# Patient Record
Sex: FEMALE | RURAL
[De-identification: names, ages, dates, MRNs, and addresses within clinical notes are randomized per-mention and may not be internally consistent; named-entity substitution may affect disease eponyms.]

---

## 2019-09-16 ENCOUNTER — TELEPHONE (OUTPATIENT)
Dept: FAMILY MEDICINE CLINIC | Facility: CLINIC | Age: 74
End: 2019-09-16

## 2019-09-16 NOTE — TELEPHONE ENCOUNTER
Patient's  came into clinic and states patient is throwing up and having diarrhea and refuses to go to the ER.  He wants to know if you will call her something in for the diarrhea.  Please advise?

## 2019-09-16 NOTE — TELEPHONE ENCOUNTER
He caught me outside the office yesterday AM and asked me what to do - I recommended ER evaluation and possible need for IV fluids - she hasn't been a patient here for a number of years - he is. I still recommend ER visit and cannot Rx anything as she hasn't been seen here for a while and sounds like really needs ER visit.

## 2023-07-12 ENCOUNTER — APPOINTMENT (OUTPATIENT)
Dept: CARDIOLOGY | Facility: HOSPITAL | Age: 78
DRG: 064 | End: 2023-07-12
Payer: MEDICARE

## 2023-07-12 ENCOUNTER — HOSPITAL ENCOUNTER (INPATIENT)
Facility: HOSPITAL | Age: 78
LOS: 8 days | Discharge: NURSING, REHAB OR LONG-TERM CARE | DRG: 064 | End: 2023-07-20
Attending: EMERGENCY MEDICINE | Admitting: FAMILY MEDICINE
Payer: MEDICARE

## 2023-07-12 ENCOUNTER — APPOINTMENT (OUTPATIENT)
Dept: CT IMAGING | Facility: HOSPITAL | Age: 78
DRG: 064 | End: 2023-07-12
Payer: MEDICARE

## 2023-07-12 ENCOUNTER — APPOINTMENT (OUTPATIENT)
Dept: GENERAL RADIOLOGY | Facility: HOSPITAL | Age: 78
DRG: 064 | End: 2023-07-12
Payer: MEDICARE

## 2023-07-12 ENCOUNTER — APPOINTMENT (OUTPATIENT)
Dept: MRI IMAGING | Facility: HOSPITAL | Age: 78
DRG: 064 | End: 2023-07-12
Payer: MEDICARE

## 2023-07-12 DIAGNOSIS — R46.89 COGNITIVE AND BEHAVIORAL CHANGES: ICD-10-CM

## 2023-07-12 DIAGNOSIS — Z95.1 HISTORY OF CORONARY ARTERY BYPASS GRAFT: ICD-10-CM

## 2023-07-12 DIAGNOSIS — R55 SYNCOPE AND COLLAPSE: Primary | ICD-10-CM

## 2023-07-12 DIAGNOSIS — R13.10 DYSPHAGIA, UNSPECIFIED TYPE: ICD-10-CM

## 2023-07-12 DIAGNOSIS — Z74.09 IMPAIRED MOBILITY: ICD-10-CM

## 2023-07-12 DIAGNOSIS — I16.0 HYPERTENSIVE URGENCY: ICD-10-CM

## 2023-07-12 DIAGNOSIS — I21.A1 TYPE 2 MYOCARDIAL INFARCTION DUE TO HYPERTENSION: ICD-10-CM

## 2023-07-12 DIAGNOSIS — E87.6 HYPOKALEMIA: ICD-10-CM

## 2023-07-12 DIAGNOSIS — Z78.9 DECREASED INDEPENDENCE WITH ACTIVITIES OF DAILY LIVING: ICD-10-CM

## 2023-07-12 DIAGNOSIS — R41.89 COGNITIVE AND BEHAVIORAL CHANGES: ICD-10-CM

## 2023-07-12 DIAGNOSIS — E78.5 HYPERLIPIDEMIA, UNSPECIFIED HYPERLIPIDEMIA TYPE: ICD-10-CM

## 2023-07-12 DIAGNOSIS — I10 TYPE 2 MYOCARDIAL INFARCTION DUE TO HYPERTENSION: ICD-10-CM

## 2023-07-12 DIAGNOSIS — R73.9 HYPERGLYCEMIA: ICD-10-CM

## 2023-07-12 LAB
ALBUMIN SERPL-MCNC: 4.4 G/DL (ref 3.5–5.2)
ALBUMIN/GLOB SERPL: 1.8 G/DL
ALP SERPL-CCNC: 108 U/L (ref 39–117)
ALT SERPL W P-5'-P-CCNC: 8 U/L (ref 1–33)
AMPHET+METHAMPHET UR QL: NEGATIVE
AMPHETAMINES UR QL: NEGATIVE
ANION GAP SERPL CALCULATED.3IONS-SCNC: 12 MMOL/L (ref 5–15)
ANION GAP SERPL CALCULATED.3IONS-SCNC: 12 MMOL/L (ref 5–15)
APTT PPP: 27.4 SECONDS (ref 24.1–35)
AST SERPL-CCNC: 14 U/L (ref 1–32)
BACTERIA UR QL AUTO: ABNORMAL /HPF
BARBITURATES UR QL SCN: NEGATIVE
BASOPHILS # BLD AUTO: 0.05 10*3/MM3 (ref 0–0.2)
BASOPHILS NFR BLD AUTO: 0.4 % (ref 0–1.5)
BENZODIAZ UR QL SCN: NEGATIVE
BH CV ECHO MEAS - AO MAX PG: 5.5 MMHG
BH CV ECHO MEAS - AO MEAN PG: 3 MMHG
BH CV ECHO MEAS - AO ROOT DIAM: 2.8 CM
BH CV ECHO MEAS - AO V2 MAX: 117 CM/SEC
BH CV ECHO MEAS - AO V2 VTI: 24.4 CM
BH CV ECHO MEAS - AVA(I,D): 2.7 CM2
BH CV ECHO MEAS - EDV(CUBED): 107.2 ML
BH CV ECHO MEAS - EDV(MOD-SP4): 39.9 ML
BH CV ECHO MEAS - EF(MOD-SP4): 55.9 %
BH CV ECHO MEAS - ESV(CUBED): 7.2 ML
BH CV ECHO MEAS - ESV(MOD-SP4): 17.6 ML
BH CV ECHO MEAS - FS: 59.4 %
BH CV ECHO MEAS - IVS/LVPW: 0.66 CM
BH CV ECHO MEAS - IVSD: 0.84 CM
BH CV ECHO MEAS - LA DIMENSION: 3.7 CM
BH CV ECHO MEAS - LAT PEAK E' VEL: 3.6 CM/SEC
BH CV ECHO MEAS - LV DIASTOLIC VOL/BSA (35-75): 24.4 CM2
BH CV ECHO MEAS - LV MASS(C)D: 180.4 GRAMS
BH CV ECHO MEAS - LV MAX PG: 4.5 MMHG
BH CV ECHO MEAS - LV MEAN PG: 2 MMHG
BH CV ECHO MEAS - LV SYSTOLIC VOL/BSA (12-30): 10.7 CM2
BH CV ECHO MEAS - LV V1 MAX: 106 CM/SEC
BH CV ECHO MEAS - LV V1 VTI: 20.9 CM
BH CV ECHO MEAS - LVIDD: 4.8 CM
BH CV ECHO MEAS - LVIDS: 1.93 CM
BH CV ECHO MEAS - LVOT AREA: 3.1 CM2
BH CV ECHO MEAS - LVOT DIAM: 2 CM
BH CV ECHO MEAS - LVPWD: 1.27 CM
BH CV ECHO MEAS - MED PEAK E' VEL: 4.4 CM/SEC
BH CV ECHO MEAS - MV A MAX VEL: 75.8 CM/SEC
BH CV ECHO MEAS - MV DEC TIME: 0.27 MSEC
BH CV ECHO MEAS - MV E MAX VEL: 54 CM/SEC
BH CV ECHO MEAS - MV E/A: 0.71
BH CV ECHO MEAS - RAP SYSTOLE: 5 MMHG
BH CV ECHO MEAS - RVSP: 19.3 MMHG
BH CV ECHO MEAS - SI(MOD-SP4): 13.6 ML/M2
BH CV ECHO MEAS - SV(LVOT): 65.7 ML
BH CV ECHO MEAS - SV(MOD-SP4): 22.3 ML
BH CV ECHO MEAS - TR MAX PG: 14.3 MMHG
BH CV ECHO MEAS - TR MAX VEL: 189 CM/SEC
BH CV ECHO MEASUREMENTS AVERAGE E/E' RATIO: 13.5
BH CV ECHO SHUNT ASSESSMENT PERFORMED (HIDDEN SCRIPTING): 1
BILIRUB SERPL-MCNC: 0.6 MG/DL (ref 0–1.2)
BILIRUB UR QL STRIP: NEGATIVE
BUN SERPL-MCNC: 7 MG/DL (ref 8–23)
BUN SERPL-MCNC: 7 MG/DL (ref 8–23)
BUN/CREAT SERPL: 10.1 (ref 7–25)
BUN/CREAT SERPL: 9.1 (ref 7–25)
BUPRENORPHINE SERPL-MCNC: NEGATIVE NG/ML
CALCIUM SPEC-SCNC: 8.6 MG/DL (ref 8.6–10.5)
CALCIUM SPEC-SCNC: 9.2 MG/DL (ref 8.6–10.5)
CANNABINOIDS SERPL QL: NEGATIVE
CHLORIDE SERPL-SCNC: 105 MMOL/L (ref 98–107)
CHLORIDE SERPL-SCNC: 107 MMOL/L (ref 98–107)
CHOLEST SERPL-MCNC: 175 MG/DL (ref 0–200)
CLARITY UR: CLEAR
CO2 SERPL-SCNC: 23 MMOL/L (ref 22–29)
CO2 SERPL-SCNC: 24 MMOL/L (ref 22–29)
COCAINE UR QL: NEGATIVE
COLOR UR: YELLOW
CREAT SERPL-MCNC: 0.69 MG/DL (ref 0.57–1)
CREAT SERPL-MCNC: 0.77 MG/DL (ref 0.57–1)
DEPRECATED RDW RBC AUTO: 41.5 FL (ref 37–54)
EGFRCR SERPLBLD CKD-EPI 2021: 79.1 ML/MIN/1.73
EGFRCR SERPLBLD CKD-EPI 2021: 89 ML/MIN/1.73
EOSINOPHIL # BLD AUTO: 0.03 10*3/MM3 (ref 0–0.4)
EOSINOPHIL NFR BLD AUTO: 0.3 % (ref 0.3–6.2)
ERYTHROCYTE [DISTWIDTH] IN BLOOD BY AUTOMATED COUNT: 12.6 % (ref 12.3–15.4)
ETHANOL UR QL: <0.01 %
FENTANYL UR-MCNC: NEGATIVE NG/ML
GEN 5 2HR TROPONIN T REFLEX: 45 NG/L
GLOBULIN UR ELPH-MCNC: 2.4 GM/DL
GLUCOSE BLDC GLUCOMTR-MCNC: 131 MG/DL (ref 70–130)
GLUCOSE BLDC GLUCOMTR-MCNC: 149 MG/DL (ref 70–130)
GLUCOSE BLDC GLUCOMTR-MCNC: 153 MG/DL (ref 70–130)
GLUCOSE SERPL-MCNC: 200 MG/DL (ref 65–99)
GLUCOSE SERPL-MCNC: 209 MG/DL (ref 65–99)
GLUCOSE UR STRIP-MCNC: NEGATIVE MG/DL
HBA1C MFR BLD: 7 % (ref 4.8–5.6)
HCT VFR BLD AUTO: 37.3 % (ref 34–46.6)
HDLC SERPL-MCNC: 42 MG/DL (ref 40–60)
HGB BLD-MCNC: 12.2 G/DL (ref 12–15.9)
HGB UR QL STRIP.AUTO: NEGATIVE
HOLD SPECIMEN: NORMAL
HYALINE CASTS UR QL AUTO: ABNORMAL /LPF
IMM GRANULOCYTES # BLD AUTO: 0.05 10*3/MM3 (ref 0–0.05)
IMM GRANULOCYTES NFR BLD AUTO: 0.4 % (ref 0–0.5)
INR PPP: 0.92 (ref 0.91–1.09)
KETONES UR QL STRIP: NEGATIVE
LDLC SERPL CALC-MCNC: 108 MG/DL (ref 0–100)
LDLC/HDLC SERPL: 2.5 {RATIO}
LEFT ATRIUM VOLUME INDEX: 22.9 ML/M2
LEFT ATRIUM VOLUME: 37.6 ML
LEUKOCYTE ESTERASE UR QL STRIP.AUTO: ABNORMAL
LIPASE SERPL-CCNC: 27 U/L (ref 13–60)
LYMPHOCYTES # BLD AUTO: 1.21 10*3/MM3 (ref 0.7–3.1)
LYMPHOCYTES NFR BLD AUTO: 10.5 % (ref 19.6–45.3)
MAGNESIUM SERPL-MCNC: 1.7 MG/DL (ref 1.6–2.4)
MAGNESIUM SERPL-MCNC: 1.7 MG/DL (ref 1.6–2.4)
MCH RBC QN AUTO: 29.5 PG (ref 26.6–33)
MCHC RBC AUTO-ENTMCNC: 32.7 G/DL (ref 31.5–35.7)
MCV RBC AUTO: 90.3 FL (ref 79–97)
METHADONE UR QL SCN: NEGATIVE
MONOCYTES # BLD AUTO: 0.81 10*3/MM3 (ref 0.1–0.9)
MONOCYTES NFR BLD AUTO: 7 % (ref 5–12)
NEUTROPHILS NFR BLD AUTO: 81.4 % (ref 42.7–76)
NEUTROPHILS NFR BLD AUTO: 9.39 10*3/MM3 (ref 1.7–7)
NITRITE UR QL STRIP: NEGATIVE
NRBC BLD AUTO-RTO: 0 /100 WBC (ref 0–0.2)
OPIATES UR QL: NEGATIVE
OXYCODONE UR QL SCN: NEGATIVE
PCP UR QL SCN: NEGATIVE
PH UR STRIP.AUTO: 7.5 [PH] (ref 5–8)
PLATELET # BLD AUTO: 208 10*3/MM3 (ref 140–450)
PMV BLD AUTO: 10.4 FL (ref 6–12)
POTASSIUM SERPL-SCNC: 3 MMOL/L (ref 3.5–5.2)
POTASSIUM SERPL-SCNC: 3.7 MMOL/L (ref 3.5–5.2)
PROPOXYPH UR QL: NEGATIVE
PROT SERPL-MCNC: 6.8 G/DL (ref 6–8.5)
PROT UR QL STRIP: NEGATIVE
PROTHROMBIN TIME: 12.4 SECONDS (ref 11.8–14.8)
RBC # BLD AUTO: 4.13 10*6/MM3 (ref 3.77–5.28)
RBC # UR STRIP: ABNORMAL /HPF
REF LAB TEST METHOD: ABNORMAL
SODIUM SERPL-SCNC: 141 MMOL/L (ref 136–145)
SODIUM SERPL-SCNC: 142 MMOL/L (ref 136–145)
SP GR UR STRIP: 1.01 (ref 1–1.03)
SQUAMOUS #/AREA URNS HPF: ABNORMAL /HPF
TRANS CELLS #/AREA URNS HPF: ABNORMAL /HPF
TRICYCLICS UR QL SCN: NEGATIVE
TRIGL SERPL-MCNC: 139 MG/DL (ref 0–150)
TROPONIN T DELTA: 0 NG/L
TROPONIN T SERPL HS-MCNC: 45 NG/L
UROBILINOGEN UR QL STRIP: ABNORMAL
VLDLC SERPL-MCNC: 25 MG/DL (ref 5–40)
WBC # UR STRIP: ABNORMAL /HPF
WBC NRBC COR # BLD: 11.54 10*3/MM3 (ref 3.4–10.8)
WHOLE BLOOD HOLD COAG: NORMAL
WHOLE BLOOD HOLD SPECIMEN: NORMAL

## 2023-07-12 PROCEDURE — 36415 COLL VENOUS BLD VENIPUNCTURE: CPT

## 2023-07-12 PROCEDURE — 80061 LIPID PANEL: CPT | Performed by: INTERNAL MEDICINE

## 2023-07-12 PROCEDURE — 0 GADOBENATE DIMEGLUMINE 529 MG/ML SOLUTION: Performed by: FAMILY MEDICINE

## 2023-07-12 PROCEDURE — 83735 ASSAY OF MAGNESIUM: CPT | Performed by: INTERNAL MEDICINE

## 2023-07-12 PROCEDURE — 93010 ELECTROCARDIOGRAM REPORT: CPT | Performed by: INTERNAL MEDICINE

## 2023-07-12 PROCEDURE — 83690 ASSAY OF LIPASE: CPT | Performed by: EMERGENCY MEDICINE

## 2023-07-12 PROCEDURE — 82948 REAGENT STRIP/BLOOD GLUCOSE: CPT

## 2023-07-12 PROCEDURE — 70496 CT ANGIOGRAPHY HEAD: CPT

## 2023-07-12 PROCEDURE — 93005 ELECTROCARDIOGRAM TRACING: CPT

## 2023-07-12 PROCEDURE — 70553 MRI BRAIN STEM W/O & W/DYE: CPT

## 2023-07-12 PROCEDURE — 80307 DRUG TEST PRSMV CHEM ANLYZR: CPT | Performed by: INTERNAL MEDICINE

## 2023-07-12 PROCEDURE — 92610 EVALUATE SWALLOWING FUNCTION: CPT

## 2023-07-12 PROCEDURE — 36415 COLL VENOUS BLD VENIPUNCTURE: CPT | Performed by: INTERNAL MEDICINE

## 2023-07-12 PROCEDURE — A9577 INJ MULTIHANCE: HCPCS | Performed by: FAMILY MEDICINE

## 2023-07-12 PROCEDURE — 82077 ASSAY SPEC XCP UR&BREATH IA: CPT | Performed by: EMERGENCY MEDICINE

## 2023-07-12 PROCEDURE — 80053 COMPREHEN METABOLIC PANEL: CPT | Performed by: INTERNAL MEDICINE

## 2023-07-12 PROCEDURE — 71045 X-RAY EXAM CHEST 1 VIEW: CPT

## 2023-07-12 PROCEDURE — 70450 CT HEAD/BRAIN W/O DYE: CPT

## 2023-07-12 PROCEDURE — 81001 URINALYSIS AUTO W/SCOPE: CPT | Performed by: EMERGENCY MEDICINE

## 2023-07-12 PROCEDURE — 97162 PT EVAL MOD COMPLEX 30 MIN: CPT

## 2023-07-12 PROCEDURE — 99222 1ST HOSP IP/OBS MODERATE 55: CPT | Performed by: CLINICAL NURSE SPECIALIST

## 2023-07-12 PROCEDURE — 93306 TTE W/DOPPLER COMPLETE: CPT

## 2023-07-12 PROCEDURE — 93306 TTE W/DOPPLER COMPLETE: CPT | Performed by: INTERNAL MEDICINE

## 2023-07-12 PROCEDURE — 25510000001 IOPAMIDOL PER 1 ML: Performed by: FAMILY MEDICINE

## 2023-07-12 PROCEDURE — 93005 ELECTROCARDIOGRAM TRACING: CPT | Performed by: EMERGENCY MEDICINE

## 2023-07-12 PROCEDURE — 85025 COMPLETE CBC W/AUTO DIFF WBC: CPT | Performed by: EMERGENCY MEDICINE

## 2023-07-12 PROCEDURE — 83735 ASSAY OF MAGNESIUM: CPT | Performed by: EMERGENCY MEDICINE

## 2023-07-12 PROCEDURE — 99285 EMERGENCY DEPT VISIT HI MDM: CPT

## 2023-07-12 PROCEDURE — 97166 OT EVAL MOD COMPLEX 45 MIN: CPT | Performed by: OCCUPATIONAL THERAPIST

## 2023-07-12 PROCEDURE — 85730 THROMBOPLASTIN TIME PARTIAL: CPT | Performed by: EMERGENCY MEDICINE

## 2023-07-12 PROCEDURE — 70498 CT ANGIOGRAPHY NECK: CPT

## 2023-07-12 PROCEDURE — 84484 ASSAY OF TROPONIN QUANT: CPT | Performed by: EMERGENCY MEDICINE

## 2023-07-12 PROCEDURE — 83036 HEMOGLOBIN GLYCOSYLATED A1C: CPT | Performed by: INTERNAL MEDICINE

## 2023-07-12 PROCEDURE — 85610 PROTHROMBIN TIME: CPT | Performed by: EMERGENCY MEDICINE

## 2023-07-12 RX ORDER — CARVEDILOL 6.25 MG/1
6.25 TABLET ORAL 2 TIMES DAILY WITH MEALS
Status: DISCONTINUED | OUTPATIENT
Start: 2023-07-12 | End: 2023-07-12

## 2023-07-12 RX ORDER — SODIUM CHLORIDE 0.9 % (FLUSH) 0.9 %
10 SYRINGE (ML) INJECTION AS NEEDED
Status: DISCONTINUED | OUTPATIENT
Start: 2023-07-12 | End: 2023-07-20 | Stop reason: HOSPADM

## 2023-07-12 RX ORDER — SODIUM CHLORIDE 0.9 % (FLUSH) 0.9 %
10 SYRINGE (ML) INJECTION AS NEEDED
Status: DISCONTINUED | OUTPATIENT
Start: 2023-07-12 | End: 2023-07-18

## 2023-07-12 RX ORDER — CHOLECALCIFEROL (VITAMIN D3) 125 MCG
5 CAPSULE ORAL NIGHTLY PRN
Status: DISCONTINUED | OUTPATIENT
Start: 2023-07-12 | End: 2023-07-20 | Stop reason: HOSPADM

## 2023-07-12 RX ORDER — CLOPIDOGREL BISULFATE 75 MG/1
300 TABLET ORAL ONCE
Status: COMPLETED | OUTPATIENT
Start: 2023-07-12 | End: 2023-07-12

## 2023-07-12 RX ORDER — ALUMINA, MAGNESIA, AND SIMETHICONE 2400; 2400; 240 MG/30ML; MG/30ML; MG/30ML
7.5 SUSPENSION ORAL EVERY 4 HOURS PRN
Status: DISCONTINUED | OUTPATIENT
Start: 2023-07-12 | End: 2023-07-20 | Stop reason: HOSPADM

## 2023-07-12 RX ORDER — POTASSIUM CHLORIDE 20 MEQ/1
40 TABLET, EXTENDED RELEASE ORAL ONCE
Status: COMPLETED | OUTPATIENT
Start: 2023-07-12 | End: 2023-07-12

## 2023-07-12 RX ORDER — CLONIDINE HYDROCHLORIDE 0.1 MG/1
0.2 TABLET ORAL ONCE
Status: DISCONTINUED | OUTPATIENT
Start: 2023-07-12 | End: 2023-07-12

## 2023-07-12 RX ORDER — LISINOPRIL 10 MG/1
10 TABLET ORAL DAILY
COMMUNITY

## 2023-07-12 RX ORDER — CLONIDINE HYDROCHLORIDE 0.1 MG/1
0.1 TABLET ORAL ONCE
Status: COMPLETED | OUTPATIENT
Start: 2023-07-12 | End: 2023-07-12

## 2023-07-12 RX ORDER — LISINOPRIL 20 MG/1
20 TABLET ORAL
Status: DISCONTINUED | OUTPATIENT
Start: 2023-07-12 | End: 2023-07-16

## 2023-07-12 RX ORDER — BISACODYL 10 MG
10 SUPPOSITORY, RECTAL RECTAL DAILY PRN
Status: DISCONTINUED | OUTPATIENT
Start: 2023-07-12 | End: 2023-07-20 | Stop reason: HOSPADM

## 2023-07-12 RX ORDER — ACETAMINOPHEN 160 MG/5ML
650 SOLUTION ORAL EVERY 4 HOURS PRN
Status: DISCONTINUED | OUTPATIENT
Start: 2023-07-12 | End: 2023-07-18

## 2023-07-12 RX ORDER — SODIUM CHLORIDE 0.9 % (FLUSH) 0.9 %
10 SYRINGE (ML) INJECTION EVERY 12 HOURS SCHEDULED
Status: DISCONTINUED | OUTPATIENT
Start: 2023-07-12 | End: 2023-07-20 | Stop reason: HOSPADM

## 2023-07-12 RX ORDER — SODIUM CHLORIDE 9 MG/ML
40 INJECTION, SOLUTION INTRAVENOUS AS NEEDED
Status: DISCONTINUED | OUTPATIENT
Start: 2023-07-12 | End: 2023-07-18

## 2023-07-12 RX ORDER — ONDANSETRON 2 MG/ML
4 INJECTION INTRAMUSCULAR; INTRAVENOUS EVERY 6 HOURS PRN
Status: DISCONTINUED | OUTPATIENT
Start: 2023-07-12 | End: 2023-07-20 | Stop reason: HOSPADM

## 2023-07-12 RX ORDER — ONDANSETRON 2 MG/ML
4 INJECTION INTRAMUSCULAR; INTRAVENOUS EVERY 6 HOURS PRN
Status: DISCONTINUED | OUTPATIENT
Start: 2023-07-12 | End: 2023-07-12 | Stop reason: SDUPTHER

## 2023-07-12 RX ORDER — PRAVASTATIN SODIUM 20 MG
40 TABLET ORAL NIGHTLY
Status: DISCONTINUED | OUTPATIENT
Start: 2023-07-12 | End: 2023-07-12

## 2023-07-12 RX ORDER — LABETALOL HYDROCHLORIDE 5 MG/ML
10 INJECTION, SOLUTION INTRAVENOUS ONCE
Status: COMPLETED | OUTPATIENT
Start: 2023-07-12 | End: 2023-07-12

## 2023-07-12 RX ORDER — SODIUM CHLORIDE 9 MG/ML
40 INJECTION, SOLUTION INTRAVENOUS AS NEEDED
Status: DISCONTINUED | OUTPATIENT
Start: 2023-07-12 | End: 2023-07-20 | Stop reason: HOSPADM

## 2023-07-12 RX ORDER — ATORVASTATIN CALCIUM 40 MG/1
80 TABLET, FILM COATED ORAL NIGHTLY
Status: DISCONTINUED | OUTPATIENT
Start: 2023-07-12 | End: 2023-07-20 | Stop reason: HOSPADM

## 2023-07-12 RX ORDER — ASPIRIN 81 MG/1
81 TABLET ORAL DAILY
Status: DISCONTINUED | OUTPATIENT
Start: 2023-07-12 | End: 2023-07-20 | Stop reason: HOSPADM

## 2023-07-12 RX ORDER — ACETAMINOPHEN 325 MG/1
650 TABLET ORAL EVERY 4 HOURS PRN
Status: DISCONTINUED | OUTPATIENT
Start: 2023-07-12 | End: 2023-07-18

## 2023-07-12 RX ORDER — CLOPIDOGREL BISULFATE 75 MG/1
75 TABLET ORAL DAILY
Status: DISCONTINUED | OUTPATIENT
Start: 2023-07-13 | End: 2023-07-20 | Stop reason: HOSPADM

## 2023-07-12 RX ORDER — PRAVASTATIN SODIUM 40 MG
40 TABLET ORAL DAILY
COMMUNITY
End: 2023-07-20 | Stop reason: HOSPADM

## 2023-07-12 RX ORDER — ACETAMINOPHEN 650 MG/1
650 SUPPOSITORY RECTAL EVERY 4 HOURS PRN
Status: DISCONTINUED | OUTPATIENT
Start: 2023-07-12 | End: 2023-07-12

## 2023-07-12 RX ORDER — SODIUM CHLORIDE 0.9 % (FLUSH) 0.9 %
10 SYRINGE (ML) INJECTION EVERY 12 HOURS SCHEDULED
Status: DISCONTINUED | OUTPATIENT
Start: 2023-07-12 | End: 2023-07-12

## 2023-07-12 RX ADMIN — Medication 5 MG: at 22:48

## 2023-07-12 RX ADMIN — POTASSIUM CHLORIDE 40 MEQ: 1500 TABLET, EXTENDED RELEASE ORAL at 03:54

## 2023-07-12 RX ADMIN — Medication 10 ML: at 10:44

## 2023-07-12 RX ADMIN — SODIUM CHLORIDE 5 MG/HR: 9 INJECTION, SOLUTION INTRAVENOUS at 05:03

## 2023-07-12 RX ADMIN — ATORVASTATIN CALCIUM 80 MG: 40 TABLET ORAL at 21:57

## 2023-07-12 RX ADMIN — CARVEDILOL 6.25 MG: 6.25 TABLET, FILM COATED ORAL at 05:33

## 2023-07-12 RX ADMIN — ASPIRIN 81 MG: 81 TABLET, COATED ORAL at 10:15

## 2023-07-12 RX ADMIN — LABETALOL HYDROCHLORIDE 10 MG: 5 INJECTION INTRAVENOUS at 03:10

## 2023-07-12 RX ADMIN — LABETALOL HYDROCHLORIDE 10 MG: 5 INJECTION INTRAVENOUS at 01:20

## 2023-07-12 RX ADMIN — IOPAMIDOL 100 ML: 755 INJECTION, SOLUTION INTRAVENOUS at 11:05

## 2023-07-12 RX ADMIN — SODIUM CHLORIDE 1000 ML: 9 INJECTION, SOLUTION INTRAVENOUS at 01:19

## 2023-07-12 RX ADMIN — ACETAMINOPHEN 650 MG: 325 TABLET, FILM COATED ORAL at 22:48

## 2023-07-12 RX ADMIN — GADOBENATE DIMEGLUMINE 20 ML: 529 INJECTION, SOLUTION INTRAVENOUS at 06:43

## 2023-07-12 RX ADMIN — LISINOPRIL 20 MG: 20 TABLET ORAL at 05:33

## 2023-07-12 RX ADMIN — CLOPIDOGREL BISULFATE 300 MG: 75 TABLET ORAL at 10:14

## 2023-07-12 RX ADMIN — Medication 10 ML: at 21:57

## 2023-07-12 RX ADMIN — CLONIDINE HYDROCHLORIDE 0.1 MG: 0.1 TABLET ORAL at 03:54

## 2023-07-12 RX ADMIN — ACETAMINOPHEN 650 MG: 325 TABLET, FILM COATED ORAL at 13:49

## 2023-07-12 NOTE — PLAN OF CARE
Problem: Fall Injury Risk  Goal: Absence of Fall and Fall-Related Injury  Outcome: Ongoing, Progressing  Intervention: Promote Injury-Free Environment  Recent Flowsheet Documentation  Taken 7/12/2023 1100 by Shandra Faria RN  Safety Promotion/Fall Prevention:   room organization consistent   safety round/check completed  Taken 7/12/2023 1000 by Shandra aFria RN  Safety Promotion/Fall Prevention: safety round/check completed     Problem: Skin Injury Risk Increased  Goal: Skin Health and Integrity  Outcome: Ongoing, Progressing  Intervention: Optimize Skin Protection  Recent Flowsheet Documentation  Taken 7/12/2023 1100 by Shandra Faria RN  Head of Bed (HOB) Positioning: HOB at 20-30 degrees     Problem: Adult Inpatient Plan of Care  Goal: Plan of Care Review  Outcome: Ongoing, Progressing  Flowsheets  Taken 7/12/2023 1124 by Shandra Faria RN  Progress: no change  Plan of Care Reviewed With: patient  Taken 7/12/2023 1035 by Caren Colón, OTR/L, CSRS  Outcome Evaluation: OT eval completed. Pt presents alert, oriented to self and vcs/prompts for time. She reports at baseline being independent with all adls and mobility, residing at home with spouse.  Goal: Patient-Specific Goal (Individualized)  Outcome: Ongoing, Progressing  Goal: Absence of Hospital-Acquired Illness or Injury  Outcome: Ongoing, Progressing  Intervention: Identify and Manage Fall Risk  Recent Flowsheet Documentation  Taken 7/12/2023 1100 by Shandra Faria RN  Safety Promotion/Fall Prevention:   room organization consistent   safety round/check completed  Taken 7/12/2023 1000 by Shandra Faria RN  Safety Promotion/Fall Prevention: safety round/check completed  Intervention: Prevent Skin Injury  Recent Flowsheet Documentation  Taken 7/12/2023 1100 by Shandra Faria RN  Body Position: right  Taken 7/12/2023 1000 by Shandra Faria RN  Body Position:   tilted   right   position changed independently  Intervention: Prevent and Manage VTE (Venous  Thromboembolism) Risk  Recent Flowsheet Documentation  Taken 7/12/2023 1100 by Shandra Faria, RN  Activity Management: bedrest  Taken 7/12/2023 1000 by Shandra Faria RN  Activity Management: bedrest  Goal: Optimal Comfort and Wellbeing  Outcome: Ongoing, Progressing  Goal: Readiness for Transition of Care  Outcome: Ongoing, Progressing     Problem: Hypertension Comorbidity  Goal: Blood Pressure in Desired Range  Outcome: Ongoing, Progressing     Problem: Thought Process Alteration  Goal: Optimal Thought Clarity  Outcome: Ongoing, Progressing  Intervention: Minimize Safety Risk and Altered Thought  Recent Flowsheet Documentation  Taken 7/12/2023 1000 by Shandra Faria, RN  Enhanced Safety Measures: bed alarm set   Goal Outcome Evaluation:  Plan of Care Reviewed With: patient        Progress: no change  Patient presents with confusion r/t stroke. Patient is reoriented each time nurse in room. Patient NIH is 7. She has periods of less confusion.

## 2023-07-12 NOTE — THERAPY EVALUATION
Acute Care - Speech Language Pathology   Swallow Initial Evaluation [facility]     Patient Name: Sherlyn Gutierrez  : 1945  MRN: [id number]  Today's Date: 2023               Admit Date: 2023    SPEECH-LANGUAGE PATHOLOGY EVALUATION - SWALLOW  Subjective: The patient was seen on this date for a Clinical Swallow evaluation.  Patient was alert and cooperative.  Significant history: HTN urgency, type 2 MI, syncope and collapse, MRI brain: acute nonhemorrhagic ischemia of frontal lobe within R MCA distribution. Hx CABG, HLD.   Objective: Textures given included thin liquid, nectar thick liquid, honey thick liquid, puree consistency, and regular consistency.  Assessment: Difficulties were noted with regular consistency.  Observations:  Poor rotary chew and delayed cough with the regular solid. Pt is edentulous and her dentures are not available.   SLP Findings:  Patient presents with mild oropharyngeal dysphagia, without esophageal component.   Recommendations: Diet Textures: thin liquid,  soft to chew food with ground meats.  Medications should be taken whole with thin liquids.   Recommended Strategies: Upright for PO, small bites and sips, and alternate liquids and solids. Oral care before breakfast, after all meals and PRN.  Other Recommended Evaluations: Cognitive-Linguistic Evaluation    Dysphagia therapy is recommended.  Alysha Griffiths, ZOË-SLP 2023 09:00 CDT     Visit Dx:     ICD-10-CM ICD-9-CM   1. Syncope and collapse  R55 780.2   2. Dysphagia, unspecified type  R13.10 787.20     Patient Active Problem List   Diagnosis    Syncope and collapse    Hypertensive urgency    Type 2 myocardial infarction due to hypertension    History of coronary artery bypass graft    Hyperlipidemia    Hypokalemia    Hyperglycemia     Past Medical History:   Diagnosis Date    Hyperlipidemia     Hypertension      Past Surgical History:   Procedure Laterality Date    OR RPR ANOM CORONARY ARTERY PULM ART ORIGIN GRAFT          SLP Recommendation and Plan  SLP Swallowing Diagnosis: mild, oral dysphagia, pharyngeal dysphagia (07/12/23 0822)  SLP Diet Recommendation: soft to chew textures, ground, thin liquids (07/12/23 0822)  Recommended Precautions and Strategies: upright posture during/after eating, small bites of food and sips of liquid, alternate between small bites of food and sips of liquid, general aspiration precautions (07/12/23 0822)  SLP Rec. for Method of Medication Administration: meds whole, with thin liquids (07/12/23 0822)     Monitor for Signs of Aspiration: yes, cough, gurgly voice, throat clearing, pneumonia, notify SLP if any concerns (07/12/23 0822)  Recommended Diagnostics: SLE/Cog/Motor Speech Evaluation (07/12/23 0822)  Swallow Criteria for Skilled Therapeutic Interventions Met: demonstrates skilled criteria (07/12/23 0822)  Anticipated Discharge Disposition (SLP): unknown (07/12/23 0822)  Rehab Potential/Prognosis, Swallowing: good, to achieve stated therapy goals (07/12/23 0822)  Therapy Frequency (Swallow): PRN (07/12/23 0822)  Predicted Duration Therapy Intervention (Days): until discharge (07/12/23 0822)                                        Plan of Care Reviewed With: patient  Outcome Evaluation: See note      SWALLOW EVALUATION (last 72 hours)       SLP Adult Swallow Evaluation       Row Name 07/12/23 0822                   Rehab Evaluation    Document Type evaluation  -MB        Subjective Information no complaints  -MB        Patient Observations alert;cooperative  -MB        Patient/Family/Caregiver Comments/Observations No family present  -MB           General Information    Patient Profile Reviewed yes  -MB        Pertinent History Of Current Problem HTN urgency, type 2 MI, syncope and collapse, MRI brain: acute nonhemorrhagic ischemia of frontal lobe within R MCA distribution. Hx CABG, HLD.  -MB        Current Method of Nutrition NPO  -MB        Precautions/Limitations, Vision WFL  -MB         Precautions/Limitations, Hearing WFL  -MB        Prior Level of Function-Communication WFL  -MB        Prior Level of Function-Swallowing no diet consistency restrictions  -MB        Plans/Goals Discussed with patient  -MB        Barriers to Rehab cognitive status  -MB        Patient's Goals for Discharge patient did not state  -MB           Pain    Additional Documentation Pain Scale: FACES Pre/Post-Treatment (Group)  -MB           Pain Scale: FACES Pre/Post-Treatment    Pain: FACES Scale, Pretreatment 0-->no hurt  -MB           Oral Motor Structure and Function    Dentition Assessment edentulous, dentures not available  -MB        Secretion Management WNL/WFL  -MB        Mucosal Quality moist, healthy  -MB           Oral Musculature and Cranial Nerve Assessment    Oral Motor General Assessment lingual impairment  -MB        Lingual Impairment, Detail. Cranial Nerves IX, XII (Glossopharyngeal and Hypoglossal) CN12: Motor Impairment;reduced strength left  -MB           General Eating/Swallowing Observations    Respiratory Support Currently in Use room air  -MB        Eating/Swallowing Skills fed by SLP  -MB        Positioning During Eating upright in bed  -MB        Utensils Used spoon;straw  -MB        Consistencies Trialed regular textures;pureed;thin liquids;nectar/syrup-thick liquids;honey-thick liquids  -MB           Clinical Swallow Eval    Oral Prep Phase impaired  -MB        Oral Transit WFL  -MB        Oral Residue WFL  -MB        Pharyngeal Phase suspected pharyngeal impairment  -MB        Esophageal Phase unremarkable  -MB        Clinical Swallow Evaluation Summary See note  -MB           Oral Prep Concerns    Oral Prep Concerns poor rotary chew  -MB        Poor Rotary Chew regular consistencies  -MB           Pharyngeal Phase Concerns    Pharyngeal Phase Concerns cough  -MB        Cough regular consistencies  -MB           SLP Evaluation Clinical Impression    SLP Swallowing Diagnosis mild;oral  dysphagia;pharyngeal dysphagia  -MB        Functional Impact risk of aspiration/pneumonia;risk of malnutrition  -MB        Rehab Potential/Prognosis, Swallowing good, to achieve stated therapy goals  -MB        Swallow Criteria for Skilled Therapeutic Interventions Met demonstrates skilled criteria  -MB           Recommendations    Therapy Frequency (Swallow) PRN  -MB        Predicted Duration Therapy Intervention (Days) until discharge  -MB        SLP Diet Recommendation soft to chew textures;ground;thin liquids  -MB        Recommended Diagnostics SLE/Cog/Motor Speech Evaluation  -MB        Recommended Precautions and Strategies upright posture during/after eating;small bites of food and sips of liquid;alternate between small bites of food and sips of liquid;general aspiration precautions  -MB        Oral Care Recommendations Oral Care BID/PRN  -MB        SLP Rec. for Method of Medication Administration meds whole;with thin liquids  -MB        Monitor for Signs of Aspiration yes;cough;gurgly voice;throat clearing;pneumonia;notify SLP if any concerns  -MB        Anticipated Discharge Disposition (SLP) unknown  -MB           Swallow Goals (SLP)    Swallow LTGs Swallow Long Term Goal (free text)  -MB        Swallow STGs diet tolerance goal selection (SLP)  -MB        Diet Tolerance Goal Selection (SLP) Patient will tolerate trials of  -MB           (LTG) Swallow    (LTG) Swallow Pt will tolerate least restrictive diet with no overt s/s of aspiration.  -MB        [place] (Swallow Long Term Goal) independently (over 90% accuracy)  -MB        Time Frame (Swallow Long Term Goal) by discharge  -MB        Barriers (Swallow Long Term Goal) n/a  -MB        Progress/Outcomes (Swallow Long Term Goal) new goal  -MB        Comment (Swallow Long Term Goal) n/a  -MB           (STG) Patient will tolerate trials of    Consistencies Trialed (Tolerate trials) regular textures;soft to chew (ground) textures;thin liquids  -MB         Desired Outcome (Tolerate trials) without signs/symptoms of aspiration;with adequate oral prep/transit/clearance  -MB        [place] (Tolerate trials) independently (over 90% accuracy)  -MB        Time Frame (Tolerate trials) by discharge  -MB        Progress/Outcomes (Tolerate trials) new goal  -MB        Comment (Tolerate trials) n/a  -MB                  User Key  (r) = Recorded By, (t) = Taken By, (c) = Cosigned By      Initials Name Effective Dates    Alysha Van CCC-SLP 02/03/23 -                     EDUCATION  The patient has been educated in the following areas:   Dysphagia (Swallowing Impairment).        SLP GOALS       Row Name 07/12/23 0822             (LTG) Swallow    (LTG) Swallow Pt will tolerate least restrictive diet with no overt s/s of aspiration.  -MB      [place] (Swallow Long Term Goal) independently (over 90% accuracy)  -MB      Time Frame (Swallow Long Term Goal) by discharge  -MB      Barriers (Swallow Long Term Goal) n/a  -MB      Progress/Outcomes (Swallow Long Term Goal) new goal  -MB      Comment (Swallow Long Term Goal) n/a  -MB         (STG) Patient will tolerate trials of    Consistencies Trialed (Tolerate trials) regular textures;soft to chew (ground) textures;thin liquids  -MB      Desired Outcome (Tolerate trials) without signs/symptoms of aspiration;with adequate oral prep/transit/clearance  -MB      [place] (Tolerate trials) independently (over 90% accuracy)  -MB      Time Frame (Tolerate trials) by discharge  -MB      Progress/Outcomes (Tolerate trials) new goal  -MB      Comment (Tolerate trials) n/a  -MB                User Key  (r) = Recorded By, (t) = Taken By, (c) = Cosigned By      Initials Name Provider Type    Alysha Van CCC-SLP Speech and Language Pathologist                       Time Calculation:    Time Calculation- SLP       Row Name 07/12/23 0900             Time Calculation- SLP    SLP Start Time 0822  -MB      SLP Stop Time  0900  -MB      SLP Time Calculation (min) 38 min  -MB      SLP Received On 07/12/23  -MB      SLP Goal Re-Cert Due Date 07/22/23  -MB         Untimed Charges    03281-[id number] Eval Oral Pharyng Swallow Minutes 38  -MB         Total Minutes    Untimed Charges Total Minutes 38  -MB       Total Minutes 38  -MB                User Key  (r) = Recorded By, (t) = Taken By, (c) = Cosigned By      Initials Name Provider Type    Alysha Van CCC-SLP Speech and Language Pathologist                    Therapy Charges for Today       Code Description Service Date Service Provider Modifiers Qty    [id number]  ST EVAL ORAL PHARYNG SWALLOW 3 7/12/2023 Alysha Griffiths CCC-SLP GN 1                 JOSHUA Linn  7/12/2023

## 2023-07-12 NOTE — PLAN OF CARE
Goal Outcome Evaluation:  Plan of Care Reviewed With: patient           Outcome Evaluation: See note

## 2023-07-12 NOTE — ED PROVIDER NOTES
Subjective   History of Present Illness  Pt presents to the  with report of syncopal episode at home.  Has reportedly fallen 3 times today - pt states gets dizzy and falls.  Denies any CP/SOB.  No n/v/f/c.  Denies any problems with urination/pain with urination.  No abdominal pain.  No numb/tingling.  Denies any new foods/meds.  Pt's BP noted to be elevated.       Review of Systems   Constitutional:  Negative for chills, diaphoresis and fever.   HENT:  Negative for congestion, ear discharge, ear pain and sinus pressure.    Eyes:  Negative for pain and visual disturbance.   Respiratory:  Negative for cough and shortness of breath.    Cardiovascular:  Negative for chest pain, palpitations and leg swelling.   Gastrointestinal:  Negative for abdominal pain, diarrhea, nausea and vomiting.   Genitourinary:  Negative for dysuria.   Musculoskeletal:  Negative for neck pain.   Skin:  Negative for color change and rash.   Neurological:  Positive for dizziness, syncope and light-headedness. Negative for headaches.   Psychiatric/Behavioral:  Positive for confusion. Negative for hallucinations.    All other systems reviewed and are negative.    History reviewed. No pertinent past medical history.    No Known Allergies    History reviewed. No pertinent surgical history.    History reviewed. No pertinent family history.             Objective   Physical Exam  Vitals and nursing note reviewed.   Constitutional:       General: She is not in acute distress.     Appearance: Normal appearance.   HENT:      Head: Normocephalic and atraumatic.      Right Ear: Tympanic membrane normal.      Left Ear: Tympanic membrane normal.      Mouth/Throat:      Mouth: Mucous membranes are moist.   Eyes:      Extraocular Movements: Extraocular movements intact.      Conjunctiva/sclera: Conjunctivae normal.      Pupils: Pupils are equal, round, and reactive to light.   Cardiovascular:      Rate and Rhythm: Normal rate and regular rhythm.      Pulses:  Normal pulses.      Heart sounds: Normal heart sounds.   Pulmonary:      Effort: Pulmonary effort is normal.      Breath sounds: Normal breath sounds.   Abdominal:      General: Abdomen is flat.      Palpations: Abdomen is soft.      Tenderness: There is no abdominal tenderness.   Musculoskeletal:      Cervical back: Normal range of motion and neck supple.   Skin:     General: Skin is warm and dry.   Neurological:      General: No focal deficit present.      Mental Status: She is alert. She is disoriented.      Cranial Nerves: No cranial nerve deficit.      Sensory: No sensory deficit.      Motor: No weakness.      Deep Tendon Reflexes: Reflexes normal.      Comments: Pt disoriented to time       Procedures           ED Course      XR Chest 1 View    (Results Pending)   CT Head Without Contrast    (Results Pending)   MRI Brain With & Without Contrast    (Results Pending)       Labs Reviewed   COMPREHENSIVE METABOLIC PANEL - Abnormal; Notable for the following components:       Result Value    Glucose 209 (*)     BUN 7 (*)     Potassium 3.0 (*)     All other components within normal limits    Narrative:     GFR Normal >60  Chronic Kidney Disease <60  Kidney Failure <15    The GFR formula is only valid for adults with stable renal function between ages 18 and 70.   URINALYSIS W/ MICROSCOPIC IF INDICATED (NO CULTURE) - Abnormal; Notable for the following components:    Leuk Esterase, UA Moderate (2+) (*)     All other components within normal limits   TROPONIN - Abnormal; Notable for the following components:    HS Troponin T 45 (*)     All other components within normal limits    Narrative:     High Sensitive Troponin T Reference Range:  <10.0 ng/L- Negative Female for AMI  <15.0 ng/L- Negative Male for AMI  >=10 - Abnormal Female indicating possible myocardial injury.  >=15 - Abnormal Male indicating possible myocardial injury.   Clinicians would have to utilize clinical acumen, EKG, Troponin, and serial changes to  determine if it is an Acute Myocardial Infarction or myocardial injury due to an underlying chronic condition.        CBC WITH AUTO DIFFERENTIAL - Abnormal; Notable for the following components:    WBC 11.54 (*)     Neutrophil % 81.4 (*)     Lymphocyte % 10.5 (*)     Neutrophils, Absolute 9.39 (*)     All other components within normal limits   HIGH SENSITIVITIY TROPONIN T 2HR - Abnormal; Notable for the following components:    HS Troponin T 45 (*)     All other components within normal limits    Narrative:     High Sensitive Troponin T Reference Range:  <10.0 ng/L- Negative Female for AMI  <15.0 ng/L- Negative Male for AMI  >=10 - Abnormal Female indicating possible myocardial injury.  >=15 - Abnormal Male indicating possible myocardial injury.   Clinicians would have to utilize clinical acumen, EKG, Troponin, and serial changes to determine if it is an Acute Myocardial Infarction or myocardial injury due to an underlying chronic condition.        URINALYSIS, MICROSCOPIC ONLY - Abnormal; Notable for the following components:    RBC, UA 0-2 (*)     WBC, UA 6-12 (*)     All other components within normal limits   PROTIME-INR - Normal   APTT - Normal   LIPASE - Normal   MAGNESIUM - Normal   ETHANOL    Narrative:     Not for legal purposes. Chain of Custody not followed.    RAINBOW DRAW    Narrative:     The following orders were created for panel order [place] Draw.  Procedure                               Abnormality         Status                     ---------                               -----------         ------                     Green Top (Gel)[[id number]]                                  Final result               Lavender Top[[id number]]                                     Final result               Red Top[[id number]]                                          Final result               Gray Top[[id number]]                                         In process                 Light Blue Top[[id number]]                                    Final result                 Please view results for these tests on the individual orders.   GREEN TOP   LAVENDER TOP   RED TOP   LIGHT BLUE TOP   CBC AND DIFFERENTIAL    Narrative:     The following orders were created for panel order CBC & Differential.  Procedure                               Abnormality         Status                     ---------                               -----------         ------                     CBC Auto Differential[[id number]]        Abnormal            Final result                 Please view results for these tests on the individual orders.   GRAY TOP                                          Medical Decision Making  Pt stable in EC att.  Has had some continued HTN in EC - had decreased to 154 systolic, but has increased to 170/128 at this time.  She has mild tremor in room when I returned to discuss results.  No focal neuro deficits.  She is slightly confused - per spouse this has been present for some time, but details of this aren't clear.  Pt has mildly elevated trop of 45 with no delta.  EKG with NS changes - no prior for comparison.  Given falls X 3 and syncope - recommend admit for further mgmt - possible cerebellar pathology or PRES.  D/w Dr. Luevano who has accepted pt - ordered cardene gtt.      Amount and/or Complexity of Data Reviewed  Labs: ordered.  Radiology: ordered and independent interpretation performed.     Details: CXR - nothing acute  ECG/medicine tests: ordered and independent interpretation performed.     Details: EKG - NSR, Nml axis, Nml QRS, ant/lat T wave inv - no prior for comparison    Risk  Prescription drug management.        Final diagnoses:   Syncope and collapse       ED Disposition  ED Disposition       ED Disposition   Decision to Admit    Condition   --    Comment   --               No follow-up provider specified.       Medication List      No changes were made to your prescriptions during this visit.             Jose Luis Vang,   07/12/23 0132       Jose Luis Vang,   07/12/23 0357

## 2023-07-12 NOTE — PLAN OF CARE
Goal Outcome Evaluation:  Plan of Care Reviewed With: patient           Outcome Evaluation: PT eval complete. Pt is alert, and only oritented to self and time with verbal cues. Prior to hospitalization, pt. was living at home with her  using stairs, ambulating, and completing ADLs independently. Pt. is unsure why she is in the hospital and thinks she is going home despite being told her deficits. Pt. had a memory span of about 20sec. Pt. needed modAx2 using the draw sheet to get from supine to sit. Pt. sensation in intact in LLE but strength is 1/5 in quads and hip flexors. Pt. vision is intact. Pt. has LE tone in L leg with 1 beat of clonus. Pt. needed mod/maxA to sit at EOB. Pt. needed maxAx2 to get from sitting to supine. Skilled PT services are needed to increase strength. Recommend d/c to inpatient rehab.      Anticipated Discharge Disposition (PT): inpatient rehabilitation facility

## 2023-07-12 NOTE — Clinical Note
Level of Care: Critical Care [6]   Diagnosis: Syncope and collapse [780.2.ICD-9-CM]   Admitting Physician: GARRETT EDWARDS [[id number]]   Attending Physician: GARRETT EDWARDS [[id number]]   Certification: I Certify That Inpatient Hospital Services Are Medically Necessary For Greater Than 2 Midnights

## 2023-07-12 NOTE — PROGRESS NOTES
Nutrition Services    Patient Name:  Sherlyn Gutierrez  YOB: 1945  MRN: [id number]  Admit Date:  7/12/2023    DM education consult reviewed. Will provide nutrition edu when pt out of unit. NTN following per protocol.    Electronically signed by:  Emmy Barry RDN, LD  07/12/23 11:01 CDT

## 2023-07-12 NOTE — THERAPY EVALUATION
Patient Name: Sherlyn Gutierrez  : 1945    MRN: [id number]                              Today's Date: 2023       Admit Date: 2023    Visit Dx:     ICD-10-CM ICD-9-CM   1. Syncope and collapse  R55 780.2   2. Dysphagia, unspecified type  R13.10 787.20   3. Decreased independence with activities of daily living [Z78.9 (ICD-10-CM)]  Z78.9 V49.89   4. Impaired mobility [Z74.09 (ICD-10-CM)]  Z74.09 799.89     Patient Active Problem List   Diagnosis    Syncope and collapse    Hypertensive urgency    Type 2 myocardial infarction due to hypertension    History of coronary artery bypass graft    Hyperlipidemia    Hypokalemia    Hyperglycemia     Past Medical History:   Diagnosis Date    Hyperlipidemia     Hypertension      Past Surgical History:   Procedure Laterality Date    OK RPR ANOM CORONARY ARTERY PULM ART ORIGIN GRAFT        General Information       Row Name 23 110          Physical Therapy Time and Intention    Document Type evaluation  CC: dizziness, L LE weakness and L facial droop Dx: MI from HTN, R MCA CVA  -LADONNA (r) MW (t) LADONNA (c)     Mode of Treatment physical therapy  -LADONNA (r) MW (t) LADONNA (c)       Row Name 23 110          General Information    Patient Profile Reviewed yes  -LADONNA (r) MW (t) LADONNA (c)     Prior Level of Function independent:;gait;using stairs;driving;ADL's;cooking;dressing;bathing  -LADONNA (r) MW (t) LADONNA (c)     Existing Precautions/Restrictions fall  -LADONNA (r) MW (t) LADONNA (c)     Barriers to Rehab physical barrier;cognitive status;medically complex  -LADONNA (r) MW (t) LADONNA (c)       Row Name 23 110          Living Environment    People in Home spouse  -LADONNA (r) MW (t) LADONNA (c)       Row Name 23 1102          Home Main Entrance    Number of Stairs, Main Entrance none  -LADONNA (r) MW (t) LADONNA (c)       Row Name 23 1102          Stairs Within Home, Primary    Number of Stairs, Within Home, Primary other (see comments)  15  -LADONNA (r) MW (t) LADONNA (c)     Stair Railings, Within Home, Primary  railings on both sides of stairs  -LADONNA (r) MW (t) LADONNA (c)       Row Name 07/12/23 1102          Cognition    Orientation Status (Cognition) oriented to;person;verbal cues/prompts needed for orientation;time;disoriented to;situation;place  -LADONNA (r) MW (t) LADONNA (c)       Row Name 07/12/23 1102          Safety Issues, Functional Mobility    Safety Issues Affecting Function (Mobility) friction/shear risk;impulsivity;insight into deficits/self-awareness  -LADONNA (r) MW (t) LADONNA (c)     Impairments Affecting Function (Mobility) balance;endurance/activity tolerance;muscle tone abnormal;postural/trunk control;range of motion (ROM);strength  -LADONNA (r) MW (t) LADONNA (c)               User Key  (r) = Recorded By, (t) = Taken By, (c) = Cosigned By      Initials Name Provider Type    Lavon Braswell, PT DPT Physical Therapist    Laure Burnett, PT Student PT Student                   Mobility       Row Name 07/12/23 1102          Bed Mobility    Bed Mobility supine-sit  -LADONNA (r) MW (t) LADONNA (c)     Supine-Sit [place] (Bed Mobility) moderate assist (50% patient effort)  -LADONNA (r) MW (t) LADONNA (c)     Supine-Sit-Supine [place] (Bed Mobility) maximum assist (25% patient effort)  -LADONNA (r) MW (t) LADONNA (c)     Assistive Device (Bed Mobility) draw sheet;head of bed elevated  -LADONNA (r) MW (t) LADONNA (c)               User Key  (r) = Recorded By, (t) = Taken By, (c) = Cosigned By      Initials Name Provider Type    Lavon Braswell PT DPT Physical Therapist    Laure Burnett, PT Student PT Student                   Obj/Interventions       Row Name 07/12/23 1102          Range of Motion Comprehensive    Comment, General Range of Motion R LE AROM WFL, L LE PROM ankle limited 50%, knee limited 75%, hip limited 50%  -LADONNA (r) MW (t) LADONNA (c)       Row Name 07/12/23 1102          Strength Comprehensive (MMT)    Comment, General Manual Muscle Testing (MMT) Assessment R LE ankle 4+/5, knee 4-/5, hip 3/5     L LE 1/5 quad/adductors, hip flexors  -LADONNA (r) MW (t)  LADONNA (c)       Row Name 07/12/23 1102          Motor Skills    Motor Skills muscle tone  -LADONNA (r) MW (t) LADONNA (c)     Muscle Tone left;lower extremity(s);clonus  1 beat of clonus  -LADONNA (r) MW (t) LADONNA (c)       Row Name 07/12/23 1102          Balance    Balance Assessment sitting dynamic balance;sitting static balance  -LADONNA (r) MW (t) LADONNA (c)     Static Sitting Balance moderate assist  -LADONNA (r) MW (t) LADONNA (c)     Dynamic Sitting Balance maximum assist;moderate assist  -LADONNA (r) MW (t) LADONNA (c)     Position, Sitting Balance sitting edge of bed  -LADONNA (r) MW (t) LADONNA (c)       Row Name 07/12/23 1102          Sensory Assessment (Somatosensory)    Sensory Assessment (Somatosensory) LE sensation intact  -LADONNA (r) MW (t) LADONNA (c)               User Key  (r) = Recorded By, (t) = Taken By, (c) = Cosigned By      Initials Name Provider Type    Lavon Braswell, PT DPT Physical Therapist    Laure Burnett, PT Student PT Student                   Goals/Plan       Row Name 07/12/23 1102          Bed Mobility Goal 1 (PT)    Activity/Assistive Device (Bed Mobility Goal 1, PT) sit to supine/supine to sit  -LADONNA (r) MW (t) LADONNA (c)     [place] Level/Cues Needed (Bed Mobility Goal 1, PT) minimum assist (75% or more patient effort)  -LADONNA (r) MW (t) LADONNA (c)     Time Frame (Bed Mobility Goal 1, PT) long term goal (LTG);10 days  -LADONNA (r) MW (t) LADONNA (c)     Progress/Outcomes (Bed Mobility Goal 1, PT) new goal  -LADONNA (r) MW (t) LADONNA (c)       Row Name 07/12/23 1102          Transfer Goal 1 (PT)    Activity/Assistive Device (Transfer Goal 1, PT) sit-to-stand/stand-to-sit;bed-to-chair/chair-to-bed  -LADONNA (r) MW (t) LADONNA (c)     [place] Level/Cues Needed (Transfer Goal 1, PT) minimum assist (75% or more patient effort)  -LADONNA (r) MW (t) LADONNA (c)     Time Frame (Transfer Goal 1, PT) long term goal (LTG);10 days  -LADONNA (r) STEPHENIE (t) LADONNA (c)     Progress/Outcome (Transfer Goal 1, PT) new goal  -LADONNA (r) STEPHENIE (t) LADONNA (c)       Row Name 07/12/23 7789          Therapy Assessment/Plan  (PT)    Planned Therapy Interventions (PT) balance training;bed mobility training;home exercise program;patient/family education;ROM (range of motion);strengthening;stretching;transfer training  -LADONNA (r) MW (t) LADONNA (c)               User Key  (r) = Recorded By, (t) = Taken By, (c) = Cosigned By      Initials Name Provider Type    Lavon Braswell, PT DPT Physical Therapist    Laure Burnett, PT Student PT Student                   Clinical Impression       Row Name 07/12/23 1102          Pain    Pretreatment Pain Rating 0/10 - no pain  -LADONNA (r) MW (t) LADONNA (c)     Posttreatment Pain Rating 0/10 - no pain  -LADONNA (r) MW (t) LADONNA (c)       Row Name 07/12/23 1102          Plan of Care Review    Plan of Care Reviewed With patient  -LADONNA (r) MW (t) LADONNA (c)     Outcome Evaluation PT eval complete. Pt is alert, and only oritented to self and time with verbal cues. Prior to hospitalization, pt. was living at home with her  using stairs, ambulating, and completing ADLs independently. Pt. is unsure why she is in the hospital and thinks she is going home despite being told her deficits. Pt. had a memory span of about 20sec. Pt. needed modAx2 using the draw sheet to get from supine to sit. Pt. sensation in intact in LLE but strength is 1/5 in quads and hip flexors. Pt. vision is intact. Pt. has LE tone in L leg with 1 beat of clonus. Pt. needed mod/maxA to sit at EOB. Pt. needed maxAx2 to get from sitting to supine. Skilled PT services are needed to increase strength. Recommend d/c to inpatient rehab.  -LADONNA (r) MW (t) LADONNA (c)       Row Name 07/12/23 1102          Therapy Assessment/Plan (PT)    Patient/Family Therapy Goals Statement (PT) to go home  -LADONNA (r) MW (t) LADONNA (c)     Rehab Potential (PT) fair, will monitor progress closely  -LADONNA (r) MW (t) LADONNA (c)     Criteria for Skilled Interventions Met (PT) yes;meets criteria;skilled treatment is necessary  -LADONNA (r) MW (t) LADONNA (c)     Therapy Frequency (PT) 2 times/day  -LADONNA (r) MW (t) LADONNA  (c)     Predicted Duration of Therapy Intervention (PT) until d/c  -LADONNA (r) MW (t) LADONNA (c)       Row Name 07/12/23 1102          Vital Signs    O2 Delivery Pre Treatment room air  -LADONNA (r) MW (t) LADONNA (c)     O2 Delivery Intra Treatment room air  -LADONNA (r) MW (t) LADONNA (c)     O2 Delivery Post Treatment room air  -LADONNA (r) MW (t) LADONNA (c)     Pre Patient Position Supine  -LADONNA (r) MW (t) LADONNA (c)     Intra Patient Position Sitting  -LADONNA (r) MW (t) LADONNA (c)     Post Patient Position Supine  -LADONNA (r) MW (t) LADONNA (c)       Row Name 07/12/23 1102          Positioning and Restraints    Pre-Treatment Position in bed  -LADONNA (r) MW (t) LADONNA (c)     Post Treatment Position bed  -LADONNA (r) MW (t) LADONNA (c)     In Bed notified nsg;fowlers;call light within reach;encouraged to call for assist;side rails up x3;exit alarm on  -LADONNA (r) MW (t) LADONNA (c)               User Key  (r) = Recorded By, (t) = Taken By, (c) = Cosigned By      Initials Name Provider Type    Lavon Braswell, PT DPT Physical Therapist    Laure Burnett, SANDRA Student PT Student                   Outcome Measures       Row Name 07/12/23 1102 07/12/23 0730       How much help from another person do you currently need...    Turning from your back to your side while in flat bed without using bedrails? 3  -LADONNA (r) MW (t) LADONNA (c) 3  -VS    Moving from lying on back to sitting on the side of a flat bed without bedrails? 2  -LADONNA (r) MW (t) LADONNA (c) 2  -VS    Moving to and from a bed to a chair (including a wheelchair)? 1  -LADONNA (r) MW (t) LADONNA (c) 2  -VS    Standing up from a chair using your arms (e.g., wheelchair, bedside chair)? 1  -LADONNA (r) MW (t) LADONNA (c) 2  -VS    Climbing 3-5 steps with a railing? 1  -LADONNA (r) MW (t) LADONNA (c) 1  -VS    To walk in hospital room? 1  -LADONNA (r) MW (t) LADONNA (c) 2  -VS    AM-PAC 6 Clicks Score (PT) 9  -LADONNA (r) MW (t) 12  -VS    Highest level of mobility 3 --> Sat at edge of bed  -LADONNA (r) MW (t) 4 --> Transferred to chair/commode  -VS      Row Name 07/12/23 1102 07/12/23 1035       Modified  [place] Scale    Pre-Stroke Modified [place] Scale -- 0 - No Symptoms at all.  -JJ    Modified [place] Scale 5 - Severe disability.  Bedridden, incontinent, and requiring constant nursing care and attention.  -LADONNA (r) MW (t) LADONNA (c) 5 - Severe disability.  Bedridden, incontinent, and requiring constant nursing care and attention.  -JJ      Row Name 07/12/23 1102 07/12/23 1035       Functional Assessment    Outcome Measure Options AM-PAC 6 Clicks Basic Mobility (PT);Modified [place]  -LADONNA (r) MW (t) LADONNA (c) AM-PAC 6 Clicks Daily Activity (OT);Modified [place]  -JJ              User Key  (r) = Recorded By, (t) = Taken By, (c) = Cosigned By      Initials Name Provider Type    Lavon Braswell, PT DPT Physical Therapist    Beti Navarro, RN Registered Nurse    Caren Andrews, OTR/L, CSRS Occupational Therapist    Laure Burnett, PT Student PT Student                                 Physical Therapy Education       Title: PT OT SLP Therapies (In Progress)       Topic: Physical Therapy (In Progress)       Point: Mobility training (Not Started)       Learner Progress:  Not documented in this visit.              Point: Home exercise program (Not Started)       Learner Progress:  Not documented in this visit.              Point: Body mechanics (Not Started)       Learner Progress:  Not documented in this visit.              Point: Precautions (Done)       Learning Progress Summary             Patient Acceptance, E, VU by  at 7/12/2023 1102    Comment: Educated pt. on not getting out of bed and needing assitance.                                         User Key       Initials Effective Dates Name Provider Type Discipline     05/25/23 -  Laure Ornelas, PT Student PT Student PT                  PT Recommendation and Plan  Planned Therapy Interventions (PT): balance training, bed mobility training, home exercise program, patient/family education, ROM (range of motion), strengthening, stretching, transfer training  Plan  of Care Reviewed With: patient  Outcome Evaluation: PT eval complete. Pt is alert, and only oritented to self and time with verbal cues. Prior to hospitalization, pt. was living at home with her  using stairs, ambulating, and completing ADLs independently. Pt. is unsure why she is in the hospital and thinks she is going home despite being told her deficits. Pt. had a memory span of about 20sec. Pt. needed modAx2 using the draw sheet to get from supine to sit. Pt. sensation in intact in LLE but strength is 1/5 in quads and hip flexors. Pt. vision is intact. Pt. has LE tone in L leg with 1 beat of clonus. Pt. needed mod/maxA to sit at EOB. Pt. needed maxAx2 to get from sitting to supine. Skilled PT services are needed to increase strength. Recommend d/c to inpatient rehab.     Time Calculation:    PT Charges       Row Name 07/12/23 1102             Time Calculation    Start Time 1102  -LADONNA (r) MW (t) LADONNA (c)      Stop Time 1140  -LADONNA      Time Calculation (min) 38 min  -LADONNA (r) MW (t)      PT Received On 07/12/23  -LADONNA (r) MW (t) LADONNA (c)      PT Goal Re-Cert Due Date 07/22/23  -LADONNA (r) MW (t) LADONNA (c)                User Key  (r) = Recorded By, (t) = Taken By, (c) = Cosigned By      Initials Name Provider Type    Lavon Braswell, PT DPT Physical Therapist    Laure Burnett, PT Student PT Student                      PT G-Codes  Outcome Measure Options: AM-PAC 6 Clicks Basic Mobility (PT), Modified [place]  AM-PAC 6 Clicks Score (PT): 9  AM-PAC 6 Clicks Score (OT): 14  Modified [place] Scale: 5 - Severe disability.  Bedridden, incontinent, and requiring constant nursing care and attention.  PT Discharge Summary  Anticipated Discharge Disposition (PT): inpatient rehabilitation facility    Laure Ornelas PT Student  7/12/2023

## 2023-07-12 NOTE — PLAN OF CARE
Goal Outcome Evaluation:      Pt admitted to ICU around 05:00. BP was elevated when she first arrived and Cardene was infusing. Max rate of 10. BP has since been turned off as BP is adequate. MRI obtained. Pt is oriented to self and place. Slight facial droop to the left. Pt has full movement of upper extremities and RLE. However, LLE is weak and she can only flex the foot. PERRLA. NIHSS of 7. Bedside neuro and NIHSS obtained with NATALY Bang. Safety maintained and bed alarm set.

## 2023-07-12 NOTE — CASE MANAGEMENT/SOCIAL WORK
Discharge Planning Assessment  [facility]     Patient Name: Sherlyn Gutierrez  MRN: [id number]  Today's Date: 7/12/2023    Admit Date: 7/12/2023        Discharge Needs Assessment       Row Name 07/12/23 1512       Living Environment    People in Home spouse    Name(s) of People in Home Lonnie Gutierrez    Current Living Arrangements home    Potentially Unsafe Housing Conditions none    Primary Care Provided by self    Provides Primary Care For no one    Family Caregiver if Needed spouse    Family Caregiver Names Lonnie Gutierrez    Quality of Family Relationships supportive    Able to Return to Prior Arrangements yes       Resource/Environmental Concerns    Resource/Environmental Concerns none    Transportation Concerns none       Food Insecurity    Within the past 12 months, you worried that your food would run out before you got the money to buy more. Never true    Within the past 12 months, the food you bought just didn't last and you didn't have money to get more. Never true       Transition Planning    Patient/Family Anticipates Transition to inpatient rehabilitation facility;long-term care facility    Patient/Family Anticipated Services at Transition skilled nursing;rehabilitation services    Transportation Anticipated family or friend will provide       Discharge Needs Assessment    Readmission Within the Last 30 Days no previous admission in last 30 days    Equipment Currently Used at Home none    Concerns to be Addressed discharge planning;adjustment to diagnosis/illness    Anticipated Changes Related to Illness inability to care for self    Outpatient/Agency/Support Group Needs skilled nursing facility;inpatient rehabilitation facility    Discharge Facility/Level of Care Needs nursing facility, skilled;acute rehab    Provided Post Acute Provider List? Yes    Post Acute Provider List Inpatient Rehab    Provided Post Acute Provider Quality & Resource List? Yes    Post Acute Provider Quality and Resource List Inpatient  Rehab    Delivered To Support Person    Support Person Spouse - Lonnie Gutierrez    Method of Delivery Telephone    Patient's Choice of Community Agency(s) [facility]    Discharge Coordination/Progress SW spoke with patient's spouse to complete dc planning assessment.  Spouse advised patient functioned independently prior to admission.  Patient has a PCP and RX coverage.  SW discussed recommendation from therapy regarding rehab placement.  Spouse was in agreement and prefers [facility].  PT evaluation pending.  Will proceed with referral when appropriate.                   Discharge Plan    No documentation.                 Continued Care and Services - Admitted Since 7/12/2023    Coordination has not been started for this encounter.          Demographic Summary    No documentation.                  Functional Status    No documentation.                  Psychosocial    No documentation.                  Abuse/Neglect    No documentation.                  Legal    No documentation.                  Substance Abuse    No documentation.                  Patient Forms    No documentation.                     GRANT Jorge

## 2023-07-12 NOTE — PLAN OF CARE
Goal Outcome Evaluation:  Plan of Care Reviewed With: patient           Outcome Evaluation: OT eval completed. Pt presents alert, oriented to self and vcs/prompts for time. She reports at baseline being independent with all adls and mobility, residing at home with spouse. Today she demonstrated L LE weakness, L sided spasticity, impaired postural control, ROM and balance. She required Mod A for supine to sit at EOB. Upon sitting up she demonstrates decreased hip flexion d/t tone resulting in a strong posterior lean, requiring Mod/Max to maintain sitting balance. She demonstrates decreased short term memory, continually asking the same questions throughout eval that therapist had already answered. She tends to keep her L hand curled into a fist, wrist and elbow flexed up. Increased tone in L UE limits her ROM. She required Max A to return to folwers in bed. She was able to return to supine in bed. Pt would benefit from skilled OT services. Recommend d/c to acute IP rehab for continued skilled therapy services.      Anticipated Discharge Disposition (OT): inpatient rehabilitation facility

## 2023-07-12 NOTE — ED NOTES
Nursing report ED to floor  Sherlyn Gutierrez  78 y.o.  female    HPI:   Chief Complaint   Patient presents with    Syncope       Admitting doctor:   Igor Luevano DO    Consulting provider(s):  Consults       No orders found from 6/13/2023 to 7/13/2023.             Admitting diagnosis:   The encounter diagnosis was Syncope and collapse.    Code status:   Current Code Status       Date Active Code Status Order ID Comments User Context       Not on file            Allergies:   Patient has no known allergies.    Intake and Output  No intake or output data in the 24 hours ending 07/12/23 0429    Weight:       07/12/23  0040   Weight: 58.3 kg (128 lb 9.6 oz)       Most recent vitals:   Vitals:    07/12/23 0246 07/12/23 0316 07/12/23 0334 07/12/23 0401   BP: (!) 236/180 154/70 (!) 189/88 179/95   BP Location:       Patient Position:       Pulse: 76 70 70 69   Resp:       Temp:       TempSrc:       SpO2: 97% 93% (!) 87% 93%   Weight:       Height:         Oxygen Therapy: .    Active LDAs/IV Access:   Lines, Drains & Airways       Active LDAs       Name Placement date Placement time Site Days    Peripheral IV 07/12/23 0119 Anterior;Left;Proximal Forearm 07/12/23 0119  Forearm  less than 1                    Labs (abnormal labs have a star):   Labs Reviewed   COMPREHENSIVE METABOLIC PANEL - Abnormal; Notable for the following components:       Result Value    Glucose 209 (*)     BUN 7 (*)     Potassium 3.0 (*)     All other components within normal limits    Narrative:     GFR Normal >60  Chronic Kidney Disease <60  Kidney Failure <15    The GFR formula is only valid for adults with stable renal function between ages 18 and 70.   URINALYSIS W/ MICROSCOPIC IF INDICATED (NO CULTURE) - Abnormal; Notable for the following components:    Leuk Esterase, UA Moderate (2+) (*)     All other components within normal limits   TROPONIN - Abnormal; Notable for the following components:    HS Troponin T 45 (*)     All other components  within normal limits    Narrative:     High Sensitive Troponin T Reference Range:  <10.0 ng/L- Negative Female for AMI  <15.0 ng/L- Negative Male for AMI  >=10 - Abnormal Female indicating possible myocardial injury.  >=15 - Abnormal Male indicating possible myocardial injury.   Clinicians would have to utilize clinical acumen, EKG, Troponin, and serial changes to determine if it is an Acute Myocardial Infarction or myocardial injury due to an underlying chronic condition.        CBC WITH AUTO DIFFERENTIAL - Abnormal; Notable for the following components:    WBC 11.54 (*)     Neutrophil % 81.4 (*)     Lymphocyte % 10.5 (*)     Neutrophils, Absolute 9.39 (*)     All other components within normal limits   HIGH SENSITIVITIY TROPONIN T 2HR - Abnormal; Notable for the following components:    HS Troponin T 45 (*)     All other components within normal limits    Narrative:     High Sensitive Troponin T Reference Range:  <10.0 ng/L- Negative Female for AMI  <15.0 ng/L- Negative Male for AMI  >=10 - Abnormal Female indicating possible myocardial injury.  >=15 - Abnormal Male indicating possible myocardial injury.   Clinicians would have to utilize clinical acumen, EKG, Troponin, and serial changes to determine if it is an Acute Myocardial Infarction or myocardial injury due to an underlying chronic condition.        URINALYSIS, MICROSCOPIC ONLY - Abnormal; Notable for the following components:    RBC, UA 0-2 (*)     WBC, UA 6-12 (*)     All other components within normal limits   PROTIME-INR - Normal   APTT - Normal   LIPASE - Normal   MAGNESIUM - Normal   ETHANOL    Narrative:     Not for legal purposes. Chain of Custody not followed.    RAINBOW DRAW    Narrative:     The following orders were created for panel order [place] Draw.  Procedure                               Abnormality         Status                     ---------                               -----------         ------                     [facility]  (Gel)[[id number]]                                  Final result               Lavender Top[[id number]]                                     Final result               Red Top[[id number]]                                          Final result               Gray Top[[id number]]                                         In process                 Light Blue Top[[id number]]                                   Final result                 Please view results for these tests on the individual orders.   GREEN TOP   LAVENDER TOP   RED TOP   LIGHT BLUE TOP   CBC AND DIFFERENTIAL    Narrative:     The following orders were created for panel order CBC & Differential.  Procedure                               Abnormality         Status                     ---------                               -----------         ------                     CBC Auto Differential[[id number]]        Abnormal            Final result                 Please view results for these tests on the individual orders.   GRAY TOP       Meds given in ED:   Medications   sodium chloride 0.9 % flush 10 mL (has no administration in time range)   niCARdipine (CARDENE) 25 mg in 250 mL NS infusion (has no administration in time range)   sodium chloride 0.9 % bolus 1,000 mL (0 mL Intravenous Stopped 7/12/23 0258)   labetalol (NORMODYNE,TRANDATE) injection 10 mg (10 mg Intravenous Given 7/12/23 0120)   labetalol (NORMODYNE,TRANDATE) injection 10 mg (10 mg Intravenous Given 7/12/23 0310)   potassium chloride (K-DUR,KLOR-CON) CR tablet 40 mEq (40 mEq Oral Given 7/12/23 0354)   cloNIDine (CATAPRES) tablet 0.1 mg (0.1 mg Oral Given 7/12/23 0354)     niCARdipine, 5-15 mg/hr         NIH Stroke Scale:       Isolation/Infection(s):  No active isolations   No active infections     COVID Testing  Collected .  Resulted .    Nursing report ED to floor:  Mental status: .  Ambulatory status: .  Precautions: .    ED nurse phone extentsion- ..

## 2023-07-12 NOTE — H&P
[facility] Medicine Services  HISTORY AND PHYSICAL    Date of Admission: 7/12/2023  Primary Care Physician: Ilia Serra MD    Subjective   Primary Historian: Patient.     Chief Complaint: Dizziness, passed out and fell.     History of Present Illness  This is a 78-year-old  female patient who resides in [place], [place].  She sees Dr. Serra for primary care.  She presents to the emergency department with complaints of dizziness and a passing out episode earlier today.  She states that she was going out to walk her dog and she felt very dizzy.  She did not get overheated.  She got back in the house and passed out.  She decided to come to the ER for further evaluation.    She has been extremely hypertensive the entire time that she has been in the ER.  She has received multiple doses of IV labetalol and a dose of oral clonidine.  I am despite this, her blood pressure is still 180/131 at the time that I am seeing her.    Dr. Vang apparently also spoke with her  and he was concerned about her acting differently and also having a bit of a tremor.    She denies shortness of breath or chest pain.    She states that her blood pressure is never this poorly controlled.  However, she also admits that she never checks it.  She brought in 2 pill bottles with her tonight.  She takes pravastatin 40 mg daily and lisinopril 10 mg daily.    She has significant flight of ideas.  She was very focused on getting to watch the news on [employer] the entire time I was in the room.  She asked me over and over again why she needs to keep wearing a blood pressure cuff and a pulse oximeter.    Review of Systems   Otherwise complete ROS reviewed and negative except as mentioned in the HPI.    Past Medical History:   Past Medical History:   Diagnosis Date   • Hyperlipidemia    • Hypertension       Past Surgical History:  Past Surgical History:   Procedure Laterality Date   • [place]  "RPR ANOM CORONARY ARTERY PULM ART ORIGIN GRAFT       Social History: She states that she has never smoked or drank.  She lives at home with her  in [place].    Family History: Multiple family members with hypertension.    Allergies:  No Known Allergies    Medications:  1.  Pravastatin 40 mg daily.  2.  Lisinopril 10 mg daily.    I have utilized all available immediate resources to obtain, update, or review the patient's current medications (including all prescriptions, over-the-counter products, herbals, cannabis/cannabidiol products, and vitamin/mineral/dietary (nutritional) supplements).    Objective     Vital Signs: /95   Pulse 69   Temp 99 °F (37.2 °C) (Oral)   Resp 18   Ht 165.1 cm (65\")   Wt 58.3 kg (128 lb 9.6 oz)   SpO2 93%   BMI 21.40 kg/m²   Physical Exam  Constitutional:       Comments: Up in bed.  Fidgeting with the TV remote.  No family present.   HENT:      Head: Normocephalic and atraumatic.   Eyes:      Conjunctiva/sclera: Conjunctivae normal.      Pupils: Pupils are equal, round, and reactive to light.   Neck:      Vascular: No JVD.   Cardiovascular:      Rate and Rhythm: Normal rate and regular rhythm.      Heart sounds: Normal heart sounds.      Comments: NSR on bedside telemetry.  Pulmonary:      Effort: Pulmonary effort is normal. No respiratory distress.      Breath sounds: Normal breath sounds. No wheezing or rales.      Comments: On room air.  Chest:      Chest wall: No tenderness.   Abdominal:      General: Bowel sounds are normal. There is no distension.      Palpations: Abdomen is soft.      Tenderness: There is no abdominal tenderness.   Musculoskeletal:         General: No swelling.   Skin:     General: Skin is warm and dry.      Findings: No rash.   Neurological:      Mental Status: She is alert and oriented to person, place, and time.      Cranial Nerves: No cranial nerve deficit.      Motor: No abnormal muscle tone.   Psychiatric:      Comments: Flight of ideas. "        Results Reviewed:  Lab Results (last 24 hours)     Procedure Component Value Units Date/Time    High Sensitivity Troponin T 2Hr [[id number]]  (Abnormal) Collected: 07/12/23 0311    Specimen: Blood Updated: 07/12/23 0337     HS Troponin T 45 ng/L      Troponin T Delta 0 ng/L     Narrative:      High Sensitive Troponin T Reference Range:  <10.0 ng/L- Negative Female for AMI  <15.0 ng/L- Negative Male for AMI  >=10 - Abnormal Female indicating possible myocardial injury.  >=15 - Abnormal Male indicating possible myocardial injury.   Clinicians would have to utilize clinical acumen, EKG, Troponin, and serial changes to determine if it is an Acute Myocardial Infarction or myocardial injury due to an underlying chronic condition.         Urinalysis With Microscopic If Indicated (No Culture) - Urine, Clean Catch [[id number]]  (Abnormal) Collected: 07/12/23 0209    Specimen: Urine, Clean Catch Updated: 07/12/23 0244     Color, UA Yellow     Appearance, UA Clear     pH, UA 7.5     Specific Gravity, UA 1.007     Glucose, UA Negative     Ketones, UA Negative     Bilirubin, UA Negative     Blood, UA Negative     Protein, UA Negative     Leuk Esterase, UA Moderate (2+)     Nitrite, UA Negative     Urobilinogen, UA 1.0 E.U./dL    Urinalysis, Microscopic Only - Urine, Clean Catch [[id number]]  (Abnormal) Collected: 07/12/23 0209    Specimen: Urine, Clean Catch Updated: 07/12/23 0244     RBC, UA 0-2 /HPF      WBC, UA 6-12 /HPF      Bacteria, UA None Seen /HPF      Squamous Epithelial Cells, UA 0-2 /HPF      Transitional Epithelial Cells, UA 0-2 /HPF      Hyaline Casts, UA 0-2 /LPF      Methodology Manual Light Microscopy    [place] Draw [[id number]] Collected: 07/12/23 0052    Specimen: Blood Updated: 07/12/23 0200    Narrative:      The following orders were created for panel order [place] Draw.  Procedure                               Abnormality         Status                     ---------                                -----------         ------                     Green Top (Gel)[[id number]]                                  Final result               Lavender Top[[id number]]                                     Final result               Red Top[[id number]]                                          Final result               Gray Top[[id number]]                                         In process                 Light Blue Top[[id number]]                                   Final result                 Please view results for these tests on the individual orders.    Lavender Top [[id number]] Collected: 07/12/23 0052    Specimen: Blood Updated: 07/12/23 0200     Extra Tube hold for add-on     Comment: Auto resulted       Light Blue Top [[id number]] Collected: 07/12/23 0052    Specimen: Blood Updated: 07/12/23 0200     Extra Tube Hold for add-ons.     Comment: Auto resulted       Green Top (Gel) [[id number]] Collected: 07/12/23 0052    Specimen: Blood Updated: 07/12/23 0200     Extra Tube Hold for add-ons.     Comment: Auto resulted.       Red Top [[id number]] Collected: 07/12/23 0052    Specimen: Blood Updated: 07/12/23 0200     Extra Tube Hold for add-ons.     Comment: Auto resulted.       Comprehensive Metabolic Panel [[id number]]  (Abnormal) Collected: 07/12/23 0052    Specimen: Blood Updated: 07/12/23 0127     Glucose 209 mg/dL      BUN 7 mg/dL      Creatinine 0.77 mg/dL      Sodium 141 mmol/L      Potassium 3.0 mmol/L      Chloride 105 mmol/L      CO2 24.0 mmol/L      Calcium 9.2 mg/dL      Total Protein 6.8 g/dL      Albumin 4.4 g/dL      ALT (SGPT) 8 U/L      AST (SGOT) 14 U/L      Alkaline Phosphatase 108 U/L      Total Bilirubin 0.6 mg/dL      Globulin 2.4 gm/dL      A/G Ratio 1.8 g/dL      BUN/Creatinine Ratio 9.1     Anion Gap 12.0 mmol/L      eGFR 79.1 mL/min/1.73     Narrative:      GFR Normal >60  Chronic Kidney Disease <60  Kidney Failure <15    The GFR formula is only valid for adults with stable renal function between ages 18  and 70.    Magnesium [[id number]]  (Normal) Collected: 07/12/23 0052    Specimen: Blood Updated: 07/12/23 0127     Magnesium 1.7 mg/dL     High Sensitivity Troponin T [[id number]]  (Abnormal) Collected: 07/12/23 0052    Specimen: Blood Updated: 07/12/23 0124     HS Troponin T 45 ng/L     Narrative:      High Sensitive Troponin T Reference Range:  <10.0 ng/L- Negative Female for AMI  <15.0 ng/L- Negative Male for AMI  >=10 - Abnormal Female indicating possible myocardial injury.  >=15 - Abnormal Male indicating possible myocardial injury.   Clinicians would have to utilize clinical acumen, EKG, Troponin, and serial changes to determine if it is an Acute Myocardial Infarction or myocardial injury due to an underlying chronic condition.         Lipase [[id number]]  (Normal) Collected: 07/12/23 0052    Specimen: Blood Updated: 07/12/23 0122     Lipase 27 U/L     Ethanol [[id number]] Collected: 07/12/23 0052    Specimen: Blood Updated: 07/12/23 0122     Ethanol % <0.010 %     Narrative:      Not for legal purposes. Chain of Custody not followed.     Protime-INR [[id number]]  (Normal) Collected: 07/12/23 0052    Specimen: Blood Updated: 07/12/23 0119     Protime 12.4 Seconds      INR 0.92    aPTT [[id number]]  (Normal) Collected: 07/12/23 0052    Specimen: Blood Updated: 07/12/23 0119     PTT 27.4 seconds     CBC & Differential [[id number]]  (Abnormal) Collected: 07/12/23 0052    Specimen: Blood Updated: 07/12/23 0113    Narrative:      The following orders were created for panel order CBC & Differential.  Procedure                               Abnormality         Status                     ---------                               -----------         ------                     CBC Auto Differential[[id number]]        Abnormal            Final result                 Please view results for these tests on the individual orders.    CBC Auto Differential [[id number]]  (Abnormal) Collected: 07/12/23 0052    Specimen: Blood Updated:  07/12/23 0113     WBC 11.54 10*3/mm3      RBC 4.13 10*6/mm3      Hemoglobin 12.2 g/dL      Hematocrit 37.3 %      MCV 90.3 fL      MCH 29.5 pg      MCHC 32.7 g/dL      RDW 12.6 %      RDW-SD 41.5 fl      MPV 10.4 fL      Platelets 208 10*3/mm3      Neutrophil % 81.4 %      Lymphocyte % 10.5 %      Monocyte % 7.0 %      Eosinophil % 0.3 %      Basophil % 0.4 %      Immature Grans % 0.4 %      Neutrophils, Absolute 9.39 10*3/mm3      Lymphocytes, Absolute 1.21 10*3/mm3      Monocytes, Absolute 0.81 10*3/mm3      Eosinophils, Absolute 0.03 10*3/mm3      Basophils, Absolute 0.05 10*3/mm3      Immature Grans, Absolute 0.05 10*3/mm3      nRBC 0.0 /100 WBC     [place] Top [[id number]] Collected: 07/12/23 0052    Specimen: Blood Updated: 07/12/23 0057        Imaging Results (Last 24 Hours)     Procedure Component Value Units Date/Time    CT Head Without Contrast [[id number]] Resulted: 07/12/23 0130           Updated: 07/12/23 0136    XR Chest 1 View [[id number]] Resulted: 07/12/23 0116     Updated: 07/12/23 0132        I have personally reviewed and interpreted the radiology studies and ECG obtained at time of admission.     Assessment / Plan   Assessment:   Active Hospital Problems    Diagnosis    • **Hypertensive urgency    • Type 2 myocardial infarction due to hypertension    • Syncope and collapse    • History of coronary artery bypass graft    • Hyperlipidemia    • Hypokalemia    • Hyperglycemia      Treatment Plan  The patient will be admitted to my service here at [facility].  She presents to the emergency department with complaints of dizziness and a passing out episode.  Dr. Vang apparently also spoke with her  and he was concerned about her acting differently and also having a bit of a tremor.  She has had multiple doses of labetalol and a dose of clonidine with no significant improvement of her blood pressure.  She also has an elevated troponin.  I have been asked to admit her for further  evaluation.    Starting a Cardene drip.  I am also going to increase her lisinopril dose and start her on carvedilol.  Ongoing adjustments will need to be made.    Aspirin.    Continue pravastatin.  Check FLP.    Check hemoglobin A1c.  Hyperglycemic on nonfasting labs.    2D echocardiogram.  Monitor on cardiac telemetry.    MRI of the brain with and without contrast.  Possible PRES. Currently nonfocal on exam.  Does have a bit of a tremor.    Check UDS.     Potassium replaced by the ER physician.  Check magnesium.    SCDs for DVT prophylaxis.    Medical Decision Making  Number and Complexity of problems: 1 acute, highly complex problem in the form of her hypertensive urgency that is also causing a type II myocardial infarction.    Conditions and Status        Condition is unchanged.     [facility] Data  External documents reviewed: Reviewed prior Epic records.   Cardiac tracing (EKG, telemetry) interpretation: NSR.  Radiology interpretation: As above.   Labs reviewed: As above.   Any tests that were considered but not ordered: None considered at present.      Decision rules/scores evaluated (example [id number], Wells, etc): None considered at present.      Discussed with: The patient and Dr. Vang in the ER.     Care Planning  Shared decision making: Consents to admission for further workup and treatment.  Code status and discussions: Full with full interventions.    Disposition  Social Determinants of Health that impact treatment or disposition: Nothing negative identified at present.   Estimated length of stay is 2-3 days.     I confirmed that the patient's advanced care plan is present, code status is documented, and a surrogate decision maker is listed in the patient's medical record.     The patient's surrogate decision maker is her , Lonnie.     The patient was seen and examined by me on 07/12/23 at 0410.    Electronically signed by Igor Luevano DO, 07/12/23, 04:38 CDT.

## 2023-07-12 NOTE — THERAPY EVALUATION
Patient Name: Sherlyn Gutierrez  : 1945    MRN: [id number]                              Today's Date: 2023       Admit Date: 2023    Visit Dx:     ICD-10-CM ICD-9-CM   1. Syncope and collapse  R55 780.2   2. Dysphagia, unspecified type  R13.10 787.20   3. Decreased independence with activities of daily living [Z78.9 (ICD-10-CM)]  Z78.9 V49.89     Patient Active Problem List   Diagnosis    Syncope and collapse    Hypertensive urgency    Type 2 myocardial infarction due to hypertension    History of coronary artery bypass graft    Hyperlipidemia    Hypokalemia    Hyperglycemia     Past Medical History:   Diagnosis Date    Hyperlipidemia     Hypertension      Past Surgical History:   Procedure Laterality Date    [place] RPR ANOM CORONARY ARTERY PULM ART ORIGIN GRAFT        General Information       Row Name 23 1035          OT Time and Intention    Mode of Treatment occupational therapy  -       Row Name 23 1035          General Information    Patient Profile Reviewed yes  -     Prior Level of Function independent:;all household mobility;transfer;bed mobility;ADL's  -     Existing Precautions/Restrictions fall  -     Barriers to Rehab physical barrier;cognitive status;medically complex  -       Row Name 23 1035          Living Environment    People in Home spouse  -       Row Name 23 1035          Home Main Entrance    Number of Stairs, Main Entrance none  -       Row Name 23 1035          Stairs Within Home, Primary    Number of Stairs, Within Home, Primary other (see comments)  15 steps  -       Row Name 23 1035          Cognition    Orientation Status (Cognition) oriented to;person;verbal cues/prompts needed for orientation;time  knew the month and president but not the current year  -       Row Name 23 1035          Safety Issues, Functional Mobility    Impairments Affecting Function (Mobility) balance;endurance/activity  tolerance;strength;cognition;range of motion (ROM);postural/trunk control;muscle tone abnormal  -     Cognitive Impairments, Mobility Safety/Performance insight into deficits/self-awareness;safety precaution awareness;safety precaution follow-through;problem-solving/reasoning;judgment;awareness, need for assistance  -               User Key  (r) = Recorded By, (t) = Taken By, (c) = Cosigned By      Initials Name Provider Type    Caren Andrews OTR/L, EVER Occupational Therapist                     Mobility/ADL's       Row Name 07/12/23 1035          Bed Mobility    Bed Mobility supine-sit;sit-supine  -     Supine-Sit [place] (Bed Mobility) moderate assist (50% patient effort)  -     Sit-Supine [place] (Bed Mobility) moderate assist (50% patient effort);maximum assist (25% patient effort);verbal cues  -     Bed Mobility, Safety Issues decreased use of arms for pushing/pulling;decreased use of legs for bridging/pushing  -     Assistive Device (Bed Mobility) head of bed elevated;bed rails;draw sheet  -     Comment, (Bed Mobility) Required Mod/Max A to maintain sitting balance. Pt has strong L sided tone. Pt pushing posteriorly in sitting.  -       Row Name 07/12/23 1035          Transfers    Transfers --  -     Comment, (Transfers) n/a to attempt at this time.  -       Row Name 07/12/23 1035          Functional Mobility    Functional Mobility- Comment n/a to complete at this time.  -       Row Name 07/12/23 1035          Activities of Daily Living    BADL Assessment/Intervention lower body dressing  -       Row Name 07/12/23 1035          Lower Body Dressing Assessment/Training    [place] Level (Lower Body Dressing) socks;maximum assist (25% patient effort)  -     Position (Lower Body Dressing) edge of bed sitting  -               User Key  (r) = Recorded By, (t) = Taken By, (c) = Cosigned By      Initials Name Provider Type    Caren Andrews OTR/CHARLY, EVER  Occupational Therapist                   Obj/Interventions       Row Name 07/12/23 1035          Sensory Assessment (Somatosensory)    Sensory Assessment (Somatosensory) UE sensation intact  -[facility] Name 07/12/23 1035          Vision Assessment/Intervention    Visual Impairment/Limitations WFL  -[facility] Name 07/12/23 1035          Range of Motion Comprehensive    Comment, General Range of Motion R UE AROM WFL, L shoulder AROM impaired approx 50%, L elbow ext impaired approx 50% d/t tone. She tends to keep L hand curled into tight fist with wrist fully flexed.  -[facility] Name 07/12/23 1035          Strength Comprehensive (MMT)    Comment, General Manual Muscle Testing (MMT) Assessment R UE strength 5/5, L UE strength 4/5  -[facility] Name 07/12/23 1035          Motor Skills    Motor Skills coordination;muscle tone  -     Coordination finger to nose;WFL;bilateral;upper extremity  -JJ     Muscle Tone right;upper extremity(s);lower extremity(s);moderate impairment;spasticity  -JJ       Row Name 07/12/23 1035          Balance    Balance Assessment sitting static balance;sitting dynamic balance  -JJ     Static Sitting Balance moderate assist  -JJ     Dynamic Sitting Balance moderate assist;maximum assist  -JJ     Position, Sitting Balance supported;sitting edge of bed  -JJ               User Key  (r) = Recorded By, (t) = Taken By, (c) = Cosigned By      Initials Name Provider Type    Caren Andrews, OTR/L, CSRS Occupational Therapist                   Goals/Plan       [facility] Name 07/12/23 1102          Transfer Goal 1 (OT)    Activity/Assistive Device (Transfer Goal 1, OT) commode, bedside with drop arms  -J     [place] Level/Cues Needed (Transfer Goal 1, OT) moderate assist (50-74% patient effort)  -JJ     Time Frame (Transfer Goal 1, OT) long term goal (LTG);by discharge  -JJ     Progress/Outcome (Transfer Goal 1, OT) new goal  -JJ       Row Name 07/12/23 1102          Dressing Goal 1 (OT)     Activity/Device (Dressing Goal 1, OT) dressing skills, all  -JJ     [place]/Cues Needed (Dressing Goal 1, OT) moderate assist (50-74% patient effort)  -JJ     Time Frame (Dressing Goal 1, OT) long term goal (LTG);by discharge  -JJ     Progress/Outcome (Dressing Goal 1, OT) new goal  -       Row Name 07/12/23 1102          Toileting Goal 1 (OT)    Activity/Device (Toileting Goal 1, OT) toileting skills, all  -JJ     [place] Level/Cues Needed (Toileting Goal 1, OT) moderate assist (50-74% patient effort)  -JJ     Time Frame (Toileting Goal 1, OT) long term goal (LTG);by discharge  -JJ     Progress/Outcome (Toileting Goal 1, OT) new goal  -       Row Name 07/12/23 1102          Therapy Assessment/Plan (OT)    Planned Therapy Interventions (OT) activity tolerance training;adaptive equipment training;BADL retraining;functional balance retraining;transfer/mobility retraining;strengthening exercise;occupation/activity based interventions;patient/caregiver education/training  -               User Key  (r) = Recorded By, (t) = Taken By, (c) = Cosigned By      Initials Name Provider Type    Caren Andrews, ELENAR/L, CSRS Occupational Therapist                   Clinical Impression       Row Name 07/12/23 1035          Pain Assessment    Pretreatment Pain Rating 0/10 - no pain  -JJ     Posttreatment Pain Rating 0/10 - no pain  -       Row Name 07/12/23 1035          Plan of Care Review    Plan of Care Reviewed With patient  -J     Outcome Evaluation OT eval completed. Pt presents alert, oriented to self and vcs/prompts for time. She reports at baseline being independent with all adls and mobility, residing at home with spouse. Today she demonstrated L LE weakness, L sided spasticity, impaired postural control, ROM and balance. She required Mod A for supine to sit at EOB. Upon sitting up she demonstrates decreased hip flexion d/t tone resulting in a strong posterior lean, requiring Mod/Max to maintain  sitting balance. She demonstrates decreased short term memory, continually asking the same questions throughout eval that therapist had already answered. She tends to keep her L hand curled into a fist, wrist and elbow flexed up. Increased tone in L UE limits her ROM. She required Max A to return to folwers in bed. She was able to return to supine in bed. Pt would benefit from skilled OT services. Recommend d/c to acute IP rehab for continued skilled therapy services.  -       Row Name 07/12/23 1035          Therapy Assessment/Plan (OT)    Rehab Potential (OT) good, to achieve stated therapy goals  -     Criteria for Skilled Therapeutic Interventions Met (OT) yes;skilled treatment is necessary  -     Therapy Frequency (OT) 5 times/wk  -     Predicted Duration of Therapy Intervention (OT) 10days  -       Row Name 07/12/23 1035          Therapy Plan Review/Discharge Plan (OT)    Anticipated Discharge Disposition (OT) inpatient rehabilitation facility  -       Row Name 07/12/23 1035          Positioning and Restraints    Pre-Treatment Position in bed  -     Post Treatment Position bed  -JJ     In Bed notified nsg;fowlers;call light within reach;encouraged to call for assist;exit alarm on;side rails up x3  -               User Key  (r) = Recorded By, (t) = Taken By, (c) = Cosigned By      Initials Name Provider Type    Caren Andrews, OTR/L, CSRS Occupational Therapist                   Outcome Measures       Row Name 07/12/23 1035          How much help from another is currently needed...    Putting on and taking off regular lower body clothing? 2  -JJ     Bathing (including washing, rinsing, and drying) 2  -JJ     Toileting (which includes using toilet bed pan or urinal) 2  -JJ     Putting on and taking off regular upper body clothing 2  -JJ     Taking care of personal grooming (such as brushing teeth) 3  -JJ     Eating meals 3  -JJ     AM-PAC 6 Clicks Score (OT) 14  -IRWIN       Row Name 07/12/23  1102 07/12/23 0730       How much help from another person do you currently need...    Turning from your back to your side while in flat bed without using bedrails? 3 (P)   -MW 3  -VS    Moving from lying on back to sitting on the side of a flat bed without bedrails? 2 (P)   -MW 2  -VS    Moving to and from a bed to a chair (including a wheelchair)? 1 (P)   -MW 2  -VS    Standing up from a chair using your arms (e.g., wheelchair, bedside chair)? 1 (P)   -MW 2  -VS    Climbing 3-5 steps with a railing? 1 (P)   -MW 1  -VS    To walk in hospital room? 1 (P)   -MW 2  -VS    AM-PAC 6 Clicks Score (PT) 9 (P)   -MW 12  -VS    Highest level of mobility 3 --> Sat at edge of bed (P)   -MW 4 --> Transferred to chair/commode  -VS      Row Name 07/12/23 1102 07/12/23 1035       Modified [place] Scale    Pre-Stroke Modified [place] Scale -- 0 - No Symptoms at all.  -JJ    Modified [place] Scale 5 - Severe disability.  Bedridden, incontinent, and requiring constant nursing care and attention. (P)   -MW 5 - Severe disability.  Bedridden, incontinent, and requiring constant nursing care and attention.  -JJ      Row Name 07/12/23 1102 07/12/23 1035       Functional Assessment    Outcome Measure Options AM-PAC 6 Clicks Basic Mobility (PT);Modified [place] (P)   -MW AM-PAC 6 Clicks Daily Activity (OT);Modified [place]  -JJ              User Key  (r) = Recorded By, (t) = Taken By, (c) = Cosigned By      Initials Name Provider Type    VS Beti Ortiz RN Registered Nurse    Caren Andrews, OTR/L, CSRS Occupational Therapist    Laure Burnett, PT Student PT Student                    Occupational Therapy Education       Title: PT OT SLP Therapies (In Progress)       Topic: Occupational Therapy (In Progress)       Point: ADL training (In Progress)       Description:   Instruct learner(s) on proper safety adaptation and remediation techniques during self care or transfers.   Instruct in proper use of assistive devices.                   Learning Progress Summary             Patient Acceptance, E, NR by EVAN at 7/12/2023 1116                         Point: Home exercise program (Not Started)       Description:   Instruct learner(s) on appropriate technique for monitoring, assisting and/or progressing therapeutic exercises/activities.                  Learner Progress:  Not documented in this visit.              Point: Precautions (In Progress)       Description:   Instruct learner(s) on prescribed precautions during self-care and functional transfers.                  Learning Progress Summary             Patient Acceptance, E, NR by EVAN at 7/12/2023 1116                         Point: Body mechanics (Not Started)       Description:   Instruct learner(s) on proper positioning and spine alignment during self-care, functional mobility activities and/or exercises.                  Learner Progress:  Not documented in this visit.                              User Key       Initials Effective Dates Name Provider Type Discipline    EVAN 07/11/23 -  Caren Colón OTR/L, REGANS Occupational Therapist OT                  OT Recommendation and Plan  Planned Therapy Interventions (OT): activity tolerance training, adaptive equipment training, BADL retraining, functional balance retraining, transfer/mobility retraining, strengthening exercise, occupation/activity based interventions, patient/caregiver education/training  Therapy Frequency (OT): 5 times/wk  Plan of Care Review  Plan of Care Reviewed With: patient  Outcome Evaluation: OT eval completed. Pt presents alert, oriented to self and vcs/prompts for time. She reports at baseline being independent with all adls and mobility, residing at home with spouse. Today she demonstrated L LE weakness, L sided spasticity, impaired postural control, ROM and balance. She required Mod A for supine to sit at EOB. Upon sitting up she demonstrates decreased hip flexion d/t tone resulting in a strong posterior lean,  requiring Mod/Max to maintain sitting balance. She demonstrates decreased short term memory, continually asking the same questions throughout eval that therapist had already answered. She tends to keep her L hand curled into a fist, wrist and elbow flexed up. Increased tone in L UE limits her ROM. She required Max A to return to folwers in bed. She was able to return to supine in bed. Pt would benefit from skilled OT services. Recommend d/c to acute IP rehab for continued skilled therapy services.     Time Calculation:    Time Calculation- OT       Row Name 07/12/23 1040             Time Calculation- OT    OT Start Time 1041  -      OT Stop Time 1140  -      OT Time Calculation (min) 59 min  -      OT Received On 07/12/23  -      OT Goal Re-Cert Due Date 07/22/23  -                User Key  (r) = Recorded By, (t) = Taken By, (c) = Cosigned By      Initials Name Provider Type    Caren Andrews OTR/L, CSRS Occupational Therapist                  Therapy Charges for Today       Code Description Service Date Service Provider Modifiers Qty    85204644140 HC OT EVAL MOD COMPLEXITY 4 7/12/2023 Caren Colón OTR/L, CSRS GO 1                 ROBERT Alvarez/L, CSRS  7/12/2023

## 2023-07-12 NOTE — CONSULTS
Neurology Consult Note    Consult Date: 2023  Referring MD: No ref. provider found  Reason for Consult: ischemic stroke    Patient: Sherlyn Gutierrez (78 y.o. female)  MRN: [id number]  : 1945    History of Present Illness:   Sherlyn Gutierrez is a 78 y.o. female with PMH of HTN and HLD. History obtained by medical record as no family at bedside and patient not a reliable historian. Patient presented to [facility] ED late last PM after several episodes of syncope and dizziness.  also noted that patient had not been acting like herself. Patient found to be hypertensive at 193/84. She required cardene drip as she did not respond to IV labetalol. CT head showed no acute process. MRI brain done this AM shows acute right MCA stroke. BP improved this AM to 144/73. Cardene off since 706.     A1C 7.0  LDL pending  WBC 11.5  UA moderate leukesterase  K+ 3.0 on admission and this AM 3.7        Medical History:   Past Medical/Surgical Hx:  Past Medical History:   Diagnosis Date    Hyperlipidemia     Hypertension      Past Surgical History:   Procedure Laterality Date    [place] RPR ANOM CORONARY ARTERY PULM ART ORIGIN GRAFT         Medications On Admission:  Medications Prior to Admission   Medication Sig Dispense Refill Last Dose    PRAVASTATIN SODIUM PO Take  by mouth.          Current Medications:    Current Facility-Administered Medications:     acetaminophen (TYLENOL) tablet 650 mg, 650 mg, Oral, Q4H PRN **OR** acetaminophen (TYLENOL) 160 MG/5ML solution 650 mg, 650 mg, Oral, Q4H PRN **OR** [DISCONTINUED] acetaminophen (TYLENOL) suppository 650 mg, 650 mg, Rectal, Q4H PRN, Igor Luevano DO    aluminum-magnesium hydroxide-simethicone (MAALOX MAX) 400-400-40 MG/5ML suspension 7.5 mL, 7.5 mL, Oral, Q4H PRN, Tay Ernandez MD    aspirin EC tablet 81 mg, 81 mg, Oral, Daily, Igor Luevano DO    atorvastatin (LIPITOR) tablet 80 mg, 80 mg, Oral, Nightly, Tay Ernandez MD    bisacodyl (DULCOLAX) suppository 10 mg,  10 mg, Rectal, Daily PRN, Tay Ernandez MD    carvedilol (COREG) tablet 6.25 mg, 6.25 mg, Oral, BID With Meals, gIor Luevano DO, 6.25 mg at 07/12/23 0533    clopidogrel (PLAVIX) tablet 300 mg, 300 mg, Oral, Once **AND** [START ON 7/13/2023] clopidogrel (PLAVIX) tablet 75 mg, 75 mg, Oral, Daily, Tya Ernandez MD    lisinopril (PRINIVIL,ZESTRIL) tablet 20 mg, 20 mg, Oral, Q24H, Igor Luevano DO, 20 mg at 07/12/23 0533    melatonin tablet 5 mg, 5 mg, Oral, Nightly PRN, Igor Luevano DO    niCARdipine (CARDENE) 25 mg in 250 mL NS infusion, 5-15 mg/hr, Intravenous, Titrated, Tejinder Plasencia MD, Stopped at 07/12/23 0706    ondansetron (ZOFRAN) injection 4 mg, 4 mg, Intravenous, Q6H PRN, Igor Luevano DO    sodium chloride 0.9 % flush 10 mL, 10 mL, Intravenous, PRN, Emergency, Triage Protocol, MD    sodium chloride 0.9 % flush 10 mL, 10 mL, Intravenous, PRN, Igor Luevano DO    sodium chloride 0.9 % flush 10 mL, 10 mL, Intravenous, Q12H, Tay Ernandez MD    sodium chloride 0.9 % flush 10 mL, 10 mL, Intravenous, PRN, Tay Ernandez MD    sodium chloride 0.9 % infusion 40 mL, 40 mL, Intravenous, PRN, Igor Luevano DO    sodium chloride 0.9 % infusion 40 mL, 40 mL, Intravenous, PRNAwais Amartyadeb, MD     Allergies:  No Known Allergies    Social Hx:  Social History     Socioeconomic History    Marital status:    Tobacco Use    Smoking status: Never   Substance and Sexual Activity    Alcohol use: Never    Drug use: Never       Family Hx:  History reviewed. No pertinent family history.  Physical Examination:   Vital Signs:  Vitals:    07/12/23 0845 07/12/23 0900 07/12/23 0915 07/12/23 0930   BP: 132/91 (!) 123/106 (!) 135/107 148/78   BP Location:       Patient Position:       Pulse: 66 66 69 71   Resp:       Temp:       TempSrc:       SpO2: 100% 99% 100% 98%   Weight:       Height:           General Examination:  Constitutional:  Well-developed.  Eyes:   No  scleral icterus.  HENT:   Normocephalic/atraumatic. Edentulous.   Neck/Back:  Supple w/ full range of motion.      Neurological Examination:  Mental status:   A/OX2. Follows simple 1-step commands. Patient able to state name. Stated month as August. Stated she at [facility] in [place]. She is not able to state year.   Language/speech: Mildly dysarthric. Comprehension intact.   Cranial nerves:  VFs full. PERRL, no APD.     Gaze conjugate w/o deviation. EOMI.     V1-3 equal to light touch.      Facial symmetry.     Hearing equal bilaterally to finger rub.     Symmetric palate rise .     SCM/Trapezius 5/5     Tongue midline.  Motor system:  Muscle tone and appearance are normal. No contractures.     RUE: Antigravity w/o drift.     LUE: Antigravity w/o drift.     RLE: Antigravity w/o drift.     LLE: No effort against gravity.  Reflexes:  2+ throughout with babinski on left  Sensory:   Grossly symmetric to light touch and PP.  Coordination:   No ataxia FTN. Not able to perform HTS on left  Gait:    Not tested for safety reasons.   Recent Diagnostics:   Laboratory Results:   - Reviewed in EMR  Lab Results   Component Value Date    GLUCOSE 200 (H) 07/12/2023    CALCIUM 8.6 07/12/2023     07/12/2023    K 3.7 07/12/2023    CO2 23.0 07/12/2023     07/12/2023    BUN 7 (L) 07/12/2023    CREATININE 0.69 07/12/2023    BCR 10.1 07/12/2023    ANIONGAP 12.0 07/12/2023     Lab Results   Component Value Date    WBC 11.54 (H) 07/12/2023    HGB 12.2 07/12/2023    HCT 37.3 07/12/2023    MCV 90.3 07/12/2023     07/12/2023     Lab Results   Component Value Date    PTT 27.4 07/12/2023    INR 0.92 07/12/2023     No results found for: CHOLTOT, TRIG, HDL, LDL  Lab Results   Component Value Date    HGBA1C 7.00 (H) 07/12/2023       Imaging Results:  Imaging reviewed personally in EMR/RAPID RA.    CT BRAIN - no acute intracranial abnormalities.    No results found for this or any previous visit.  CT Head Without Contrast    Result  Date: 7/12/2023  1. No acute intracranial abnormality. 2. Mild generalized volume loss with moderate chronic small vessel ischemic change.  Comments: A preliminary report is issued to the ER by the [facility] radiology service. I agree with this preliminary impression. This report was finalized on 07/12/2023 05:59 by Dr. Caren Aguilar MD.    MRI Brain With & Without Contrast    Result Date: 7/12/2023  1. Findings of acute nonhemorrhagic ischemia of the medial superior right mid to posterior frontal lobe within the right MCA distribution. No intracranial mass or abnormal intracranial enhancement. Results are communicated to Dr. Luevano on 07/12/2023 at 7:00 AM. 2. Moderate chronic small vessel ischemic change. This report was finalized on 07/12/2023 06:59 by Dr. Caren Aguilar MD.    XR Chest 1 View    Result Date: 7/12/2023  1. Shallow inspiration. Prior mediastinal surgery. Vascular crowding and likely bibasilar atelectasis. This report was finalized on 07/12/2023 06:32 by Dr. Caren Aguilar MD.          MRI brain personally reviewed by me showing acute right MCA stroke.       Assessment & Plan:   Patient presents after syncopal episode and dizziness. She is found to be hypertensive requiring leroy drip. CT head negative for acute process. MRI brain shows acute right MCA stroke.  PMH includes HTN and HLD.  IV TPA not considered as last known well unknown.     Impression:  Acute right MCA stroke  Hypertension  DM  HLD      Plan:   ASA 81 mg daily  Plavix 300 mg PO now as loading dose then 75 mg daily.  DUAP for 30 days.  Lipitor 80 mg daily  SCD for DVT prophylaxis.   CTA head and neck today  Transthoracic echo today.   Systolic BP goal 160-180.  OT/PT/FLORA Reid  07/12/23  10:13 CDT    Medical Decision Making    Number/Complexity of Problems  Moderate  1 undiagnosed new problem with uncertain prognosis -   1 acute illness with systemic symptoms -   High  1 acute or chronic illness that pose a  threat to life/body function -   High  Acute right MCA stroke     MDM Data  Moderate - 1/3 categories  Extensive - 2/3 categories    Category 1: 3 of the following  Review of external notes  Review of results  Ordering of each unique test  Independent historian  Category 2:  Independent interpretation of test (ex: imaging)  Category 3:  Discussion of management with another provider    extensive     Treatment Plan  Moderate - Prescription Drug management  High  Drug therapy requiring intensive monitoring for toxicity  Decision regarding hospitalization or escalation of care  De-escalate care/DNR decisions  high Kiarra Servin, APRN  07/12/23  10:13 CDT

## 2023-07-13 LAB
ALBUMIN SERPL-MCNC: 3.9 G/DL (ref 3.5–5.2)
ALBUMIN/GLOB SERPL: 1.4 G/DL
ALP SERPL-CCNC: 109 U/L (ref 39–117)
ALT SERPL W P-5'-P-CCNC: 9 U/L (ref 1–33)
ANION GAP SERPL CALCULATED.3IONS-SCNC: 13 MMOL/L (ref 5–15)
APTT PPP: 27.6 SECONDS (ref 24.1–35)
AST SERPL-CCNC: 23 U/L (ref 1–32)
BILIRUB SERPL-MCNC: 0.9 MG/DL (ref 0–1.2)
BUN SERPL-MCNC: 11 MG/DL (ref 8–23)
BUN/CREAT SERPL: 13.1 (ref 7–25)
CALCIUM SPEC-SCNC: 9.2 MG/DL (ref 8.6–10.5)
CHLORIDE SERPL-SCNC: 105 MMOL/L (ref 98–107)
CHOLEST SERPL-MCNC: 175 MG/DL (ref 0–200)
CO2 SERPL-SCNC: 22 MMOL/L (ref 22–29)
CREAT SERPL-MCNC: 0.84 MG/DL (ref 0.57–1)
DEPRECATED RDW RBC AUTO: 43.8 FL (ref 37–54)
EGFRCR SERPLBLD CKD-EPI 2021: 71.2 ML/MIN/1.73
ERYTHROCYTE [DISTWIDTH] IN BLOOD BY AUTOMATED COUNT: 12.8 % (ref 12.3–15.4)
ERYTHROCYTE [SEDIMENTATION RATE] IN BLOOD: 19 MM/HR (ref 0–30)
GLOBULIN UR ELPH-MCNC: 2.7 GM/DL
GLUCOSE BLDC GLUCOMTR-MCNC: 145 MG/DL (ref 70–130)
GLUCOSE BLDC GLUCOMTR-MCNC: 164 MG/DL (ref 70–130)
GLUCOSE BLDC GLUCOMTR-MCNC: 194 MG/DL (ref 70–130)
GLUCOSE SERPL-MCNC: 188 MG/DL (ref 65–99)
HCT VFR BLD AUTO: 39.7 % (ref 34–46.6)
HDLC SERPL-MCNC: 40 MG/DL (ref 40–60)
HGB BLD-MCNC: 12.7 G/DL (ref 12–15.9)
INR PPP: 0.98 (ref 0.91–1.09)
LDLC SERPL CALC-MCNC: 111 MG/DL (ref 0–100)
LDLC/HDLC SERPL: 2.7 {RATIO}
MCH RBC QN AUTO: 29.7 PG (ref 26.6–33)
MCHC RBC AUTO-ENTMCNC: 32 G/DL (ref 31.5–35.7)
MCV RBC AUTO: 93 FL (ref 79–97)
PLATELET # BLD AUTO: 215 10*3/MM3 (ref 140–450)
PMV BLD AUTO: 10.6 FL (ref 6–12)
POTASSIUM SERPL-SCNC: 3.8 MMOL/L (ref 3.5–5.2)
PROT SERPL-MCNC: 6.6 G/DL (ref 6–8.5)
PROTHROMBIN TIME: 13.1 SECONDS (ref 11.8–14.8)
RBC # BLD AUTO: 4.27 10*6/MM3 (ref 3.77–5.28)
SODIUM SERPL-SCNC: 140 MMOL/L (ref 136–145)
TRIGL SERPL-MCNC: 136 MG/DL (ref 0–150)
TSH SERPL DL<=0.05 MIU/L-ACNC: 0.9 UIU/ML (ref 0.27–4.2)
VLDLC SERPL-MCNC: 24 MG/DL (ref 5–40)
WBC NRBC COR # BLD: 11.65 10*3/MM3 (ref 3.4–10.8)

## 2023-07-13 PROCEDURE — 84443 ASSAY THYROID STIM HORMONE: CPT | Performed by: PSYCHIATRY & NEUROLOGY

## 2023-07-13 PROCEDURE — 99232 SBSQ HOSP IP/OBS MODERATE 35: CPT | Performed by: STUDENT IN AN ORGANIZED HEALTH CARE EDUCATION/TRAINING PROGRAM

## 2023-07-13 PROCEDURE — 80053 COMPREHEN METABOLIC PANEL: CPT | Performed by: PSYCHIATRY & NEUROLOGY

## 2023-07-13 PROCEDURE — 99221 1ST HOSP IP/OBS SF/LOW 40: CPT | Performed by: NURSE PRACTITIONER

## 2023-07-13 PROCEDURE — 97535 SELF CARE MNGMENT TRAINING: CPT

## 2023-07-13 PROCEDURE — 97110 THERAPEUTIC EXERCISES: CPT

## 2023-07-13 PROCEDURE — 82948 REAGENT STRIP/BLOOD GLUCOSE: CPT

## 2023-07-13 PROCEDURE — 97530 THERAPEUTIC ACTIVITIES: CPT

## 2023-07-13 PROCEDURE — 85027 COMPLETE CBC AUTOMATED: CPT | Performed by: PSYCHIATRY & NEUROLOGY

## 2023-07-13 PROCEDURE — 85730 THROMBOPLASTIN TIME PARTIAL: CPT | Performed by: PSYCHIATRY & NEUROLOGY

## 2023-07-13 PROCEDURE — 85610 PROTHROMBIN TIME: CPT | Performed by: PSYCHIATRY & NEUROLOGY

## 2023-07-13 PROCEDURE — 85652 RBC SED RATE AUTOMATED: CPT | Performed by: PSYCHIATRY & NEUROLOGY

## 2023-07-13 PROCEDURE — 80061 LIPID PANEL: CPT | Performed by: PSYCHIATRY & NEUROLOGY

## 2023-07-13 RX ADMIN — Medication 10 ML: at 22:45

## 2023-07-13 RX ADMIN — LISINOPRIL 20 MG: 20 TABLET ORAL at 08:37

## 2023-07-13 RX ADMIN — Medication 5 MG: at 22:44

## 2023-07-13 RX ADMIN — CLOPIDOGREL BISULFATE 75 MG: 75 TABLET, FILM COATED ORAL at 08:47

## 2023-07-13 RX ADMIN — ACETAMINOPHEN 650 MG: 325 TABLET, FILM COATED ORAL at 22:44

## 2023-07-13 RX ADMIN — ATORVASTATIN CALCIUM 80 MG: 40 TABLET ORAL at 22:44

## 2023-07-13 RX ADMIN — Medication 10 ML: at 08:37

## 2023-07-13 RX ADMIN — ASPIRIN 81 MG: 81 TABLET, COATED ORAL at 08:37

## 2023-07-13 NOTE — CONSULTS
Sherlyn Gutierrez  [id number]  [id number]  I004/1  Tejinder Plasencia MD  7/12/2023    Chief Complaint   Patient presents with    Syncope       HPI: Sherlyn Gutierrez is a 78 y.o. female who presented with complaints of syncope and dizziness.   noted patient had not been acting like herself.  She was found to hypertensive requiring a cardene drip.  Workup has revealed right MCA stroke with severe 95% right carotid stenosis.  She has left sided weakness with some functionality of the left upper extremity and no function of the lower extremity as well as facial droop.    Past Medical History:   Diagnosis Date    Hyperlipidemia     Hypertension        Past Surgical History:   Procedure Laterality Date    [place] RPR ANOM CORONARY ARTERY PULM ART ORIGIN GRAFT         History reviewed. No pertinent family history.    Social History     Socioeconomic History    Marital status:    Tobacco Use    Smoking status: Never    Smokeless tobacco: Never   Vaping Use    Vaping Use: Never used   Substance and Sexual Activity    Alcohol use: Never    Drug use: Never       No Known Allergies    Hospital Medications (active)         Dose Frequency Start End    acetaminophen (TYLENOL) 160 MG/5ML solution 650 mg 650 mg Every 4 Hours PRN 7/12/2023     Admin Instructions: Based on patient request - if ordered for moderate or severe pain, provider allows for administration of a medication prescribed for a lower pain scale.  Do not exceed 4 grams of acetaminophen in a 24 hr period. Max dose of 2gm for AST/ALT greater than 120 units/L    If given for fever, use fever parameter: fever greater than 100.4 °F.    If given for pain, use the following pain scale:   Mild Pain = Pain Score of 1-3, CPOT 1-2  Moderate Pain = Pain Score of 4-6, CPOT 3-4  Severe Pain = Pain Score of 7-10, CPOT 5-8    Route: Oral    Linked Group 1: See Chuck for full Linked Orders Report.        acetaminophen (TYLENOL) tablet 650 mg 650 mg Every 4 Hours PRN  7/12/2023     Admin Instructions: Based on patient request - if ordered for moderate or severe pain, provider allows for administration of a medication prescribed for a lower pain scale.  Do not exceed 4 grams of acetaminophen in a 24 hr period. Max dose of 2gm for AST/ALT greater than 120 units/L    If given for fever, use fever parameter: fever greater than 100.4 °F.    If given for pain, use the following pain scale:   Mild Pain = Pain Score of 1-3, CPOT 1-2  Moderate Pain = Pain Score of 4-6, CPOT 3-4  Severe Pain = Pain Score of 7-10, CPOT 5-8    Route: Oral    Linked Group 1: See Hyperspace for full Linked Orders Report.        aluminum-magnesium hydroxide-simethicone (MAALOX MAX) 400-400-40 MG/5ML suspension 7.5 mL 7.5 mL Every 4 Hours PRN 7/12/2023     Admin Instructions: Maximum 60 mL in 24 hours.    Route: Oral    aspirin EC tablet 81 mg 81 mg Daily 7/12/2023     Admin Instructions: Swallow tablet whole.  Do not crush, chew, or split. Based on patient request - if ordered for moderate or severe pain, provider allows for administration of a medication prescribed for a lower pain scale.  Do not exceed 4 grams of aspirin in a 24 hr period.    If given for pain, use the following pain scale:   Mild Pain = Pain Score of 1-3, CPOT 1-2  Moderate Pain = Pain Score of 4-6, CPOT 3-4  Severe Pain = Pain Score of 7-10, CPOT 5-8    Route: Oral    atorvastatin (LIPITOR) tablet 80 mg 80 mg Nightly 7/12/2023     Admin Instructions: Avoid grapefruit juice.    Route: Oral    bisacodyl (DULCOLAX) suppository 10 mg 10 mg Daily PRN 7/12/2023     Admin Instructions: Hold for diarrhea    Route: Rectal    clopidogrel (PLAVIX) tablet 75 mg 75 mg Daily 7/13/2023     Route: Oral    Linked Group 2: See Hyperspace for full Linked Orders Report.        lisinopril (PRINIVIL,ZESTRIL) tablet 20 mg 20 mg Every 24 Hours Scheduled 7/12/2023     Admin Instructions: Hold for SBP less than 100, DBP less than 60, or heart rate less than 50     Route: Oral    melatonin tablet 5 mg 5 mg Nightly PRN 7/12/2023     Route: Oral    niCARdipine (CARDENE) 25 mg in 250 mL NS infusion 5-15 mg/hr Titrated 7/12/2023     Admin Instructions: Initiate infusion at 5 mg/hr and titrate up or down by 2.5 - 5 mg/hr every 15 minutes to use the lowest dose possible to maintain SBP less than 220 mm Hg. Maximum dose = 15 mg/hr. Hold infusion for SBP less than 100 mm Hg. Contact provider if unable to maintain SBP Less Than 180 mm Hg on maximum dose. Once SBP target achieved obtain vitals a minimum of every 30 minutes.   Administer as a slow continuous infusion via central line or through a large peripheral vein. Peripheral venous irritation may be minimized by changing the site of infusion every 12 hours. Nicardipine 0.2mg/mL concentration must be infused via central line ONLY.       Caution: Look alike/sound alike drug alert.   Protect from light. Do NOT refrigerate.    Route: Intravenous    ondansetron (ZOFRAN) injection 4 mg 4 mg Every 6 Hours PRN 7/12/2023     Admin Instructions: If BOTH ondansetron (ZOFRAN) and promethazine (PHENERGAN) are ordered use ondansetron first and THEN promethazine IF ondansetron is ineffective.    Route: Intravenous    sodium chloride 0.9 % flush 10 mL 10 mL As Needed 7/12/2023     Route: Intravenous    sodium chloride 0.9 % flush 10 mL 10 mL As Needed 7/12/2023     Route: Intravenous    sodium chloride 0.9 % flush 10 mL 10 mL Every 12 Hours Scheduled 7/12/2023     Route: Intravenous    sodium chloride 0.9 % flush 10 mL 10 mL As Needed 7/12/2023     Route: Intravenous    sodium chloride 0.9 % infusion 40 mL 40 mL As Needed 7/12/2023     Admin Instructions: Following administration of an IV intermittent medication, flush line with 40mL NS at 100mL/hr.    Route: Intravenous    sodium chloride 0.9 % infusion 40 mL 40 mL As Needed 7/12/2023     Admin Instructions: Following administration of an IV intermittent medication, flush line with 40mL NS at  "100mL/hr.    Route: Intravenous            Review of Systems   HENT: Negative.     Eyes: Negative.    Respiratory: Negative.     Cardiovascular: Negative.    Gastrointestinal: Negative.    Endocrine: Negative.    Genitourinary: Negative.    Musculoskeletal: Negative.    Skin: Negative.    Allergic/Immunologic: Negative.    Neurological:  Positive for dizziness, facial asymmetry and weakness (left sided).   Hematological: Negative.    Psychiatric/Behavioral: Negative.       /64 (Patient Position: Lying)   Pulse 84   Temp 98.4 °F (36.9 °C) (Oral)   Resp 22   Ht 167.6 cm (66\")   Wt 56.7 kg (125 lb)   SpO2 98%   BMI 20.18 kg/m²    Physical Exam  Vitals and nursing note reviewed.   Constitutional:       General: She is not in acute distress.     Appearance: Normal appearance. She is well-developed. She is not diaphoretic.   HENT:      Head: Normocephalic and atraumatic.   Eyes:      General: No scleral icterus.     Pupils: Pupils are equal, round, and reactive to light.   Neck:      Thyroid: No thyromegaly.      Vascular: No carotid bruit or JVD.   Cardiovascular:      Rate and Rhythm: Normal rate and regular rhythm.      Pulses: Normal pulses.      Heart sounds: Normal heart sounds and S2 normal. No murmur heard.    No friction rub. No gallop.   Pulmonary:      Effort: Pulmonary effort is normal.      Breath sounds: Normal breath sounds.   Abdominal:      General: Bowel sounds are normal.      Palpations: Abdomen is soft.   Musculoskeletal:         General: Normal range of motion.      Cervical back: Normal range of motion and neck supple.   Skin:     General: Skin is warm and dry.   Neurological:      Mental Status: She is alert and oriented to person, place, and time.      Cranial Nerves: No cranial nerve deficit.   Psychiatric:         Mood and Affect: Mood normal.         Behavior: Behavior normal.         Thought Content: Thought content normal.         Judgment: Judgment normal.       Laboratory " Data:  Results from last 7 days   Lab Units 07/13/23 0418 07/12/23  0052   WBC 10*3/mm3 11.65* 11.54*   HEMOGLOBIN g/dL 12.7 12.2   HEMATOCRIT % 39.7 37.3   PLATELETS 10*3/mm3 215 208       Results from last 7 days   Lab Units 07/13/23  0418 07/12/23  0811 07/12/23  0052   SODIUM mmol/L 140 142 141   POTASSIUM mmol/L 3.8 3.7 3.0*   CHLORIDE mmol/L 105 107 105   CO2 mmol/L 22.0 23.0 24.0   BUN mg/dL 11 7* 7*   CREATININE mg/dL 0.84 0.69 0.77   CALCIUM mg/dL 9.2 8.6 9.2   BILIRUBIN mg/dL 0.9  --  0.6   ALK PHOS U/L 109  --  108   ALT (SGPT) U/L 9  --  8   AST (SGOT) U/L 23  --  14   GLUCOSE mg/dL 188* 200* 209*     Results from last 7 days   Lab Units 07/13/23 0418 07/12/23  0052   INR  0.98 0.92   APTT seconds 27.6 27.4         Diagnostic Data:  Imaging Results (Last 24 Hours)       Procedure Component Value Units Date/Time    CT Angiogram Neck [[id number]] Collected: 07/12/23 1334     Updated: 07/12/23 1346    Narrative:      CT ANGIOGRAM NECK- 7/12/2023 10:53 AM CDT     HISTORY: TYLER blockage; R55-Syncope and collapse; R13.10-Dysphagia,  unspecified      COMPARISON: None     DOSE LENGTH PRODUCT: 308 mGy cm. Automated exposure control was also  utilized to decrease patient radiation dose.     TECHNIQUE: Axial images the neck are performed following IV contrast.  2-D and maximal intensity projectional reconstructed images reviewed     FINDINGS:  Median sternotomy wires. Mild calcified plaque within the  normal caliber aortic arch. Mild calcification of the proximal left  common and subclavian arteries creating no significant luminal stenosis.  Right brachiocephalic artery is patent. No flow-limiting subclavian  artery stenosis. Tortuosity of the proximal left common carotid artery.      There is moderate densely calcified plaque of the right carotid bulb  extending into the proximal right internal carotid artery resulting in  65-70% stenosis of the proximal right internal carotid artery. Mid and  distal right  internal carotid artery are patent.     There is mild to moderate densely calcified plaque of the left proximal  internal carotid artery resulting in 60% stenosis.     The vertebral arteries originate from the proximal subclavian arteries  as expected. Hypoplastic right vertebral artery. Prominent tapering of  the distal bilateral feet 2 bilateral wrist with small caliber basilar  artery.     Heterogeneous thyroid with no dominant nodule. No pathologic  lymphadenopathy or suspicious soft tissue mass identified in the neck.  Moderate to advanced degenerative disc change at the C4/C5 through C6/C7  level.     Visible lung apices are unremarkable. Median sternotomy wires.       Impression:      1. Densely calcified plaque of the proximal internal carotid arteries  resulting in 70% stenosis of the proximal right ICA and 60% stenosis  proximal left ICA.  2. Hypoplastic right vertebral artery. Small caliber basilar artery  system.  3. No aneurysm or dissection.  This report was finalized on 07/12/2023 13:42 by Dr. Caren Aguilar MD.    CT Angiogram Head [[id number]] Collected: 07/12/23 1304     Updated: 07/12/23 1313    Narrative:      CT ANGIOGRAM HEAD- 7/12/2023 10:53 AM CDT     HISTORY: RPCA /R MCA blockage; R55-Syncope and collapse;  R13.10-Dysphagia, unspecified      COMPARISON: None     DOSE LENGTH PRODUCT: 308 mGy cm. Automated exposure control was also  utilized to decrease patient radiation dose.     TECHNIQUE: Axial images the brain are obtained following IV contrast.  2-D and maximal intensity projectional reconstructed images reviewed     FINDINGS:  The distal internal carotid arteries demonstrate mild  calcification of the cavernous segment of the right internal carotid  artery resulting in 50% or less stenosis. The bilateral proximal and mid  anterior and middle cerebral arteries are patent. Decreased enhancement  of the distal branches within the anterior watershed distribution of the  right frontal region  up towards the vertex. Small caliber  vertebrobasilar system. Fetal origin left posterior cerebral artery from  the anterior circulation. Small caliber proximal right posterior  cerebral artery. Patent right posterior communicating artery. No  aneurysm or dissection identified.     No abnormal enhancing intracranial lesion.       Impression:      1. No central large vessel occlusion. Decreased enhancement of the  distal branches of the right middle cerebral artery involving anterior  watershed distribution up towards the vertex. No aneurysm or dissection.  Small caliber vertebrobasilar system. Fetal origin left posterior  cerebral artery.  This report was finalized on 07/12/2023 13:10 by Dr. Caren Aguilar MD.            Impression:    Hypertensive urgency    Syncope and collapse    Type 2 myocardial infarction due to hypertension    History of coronary artery bypass graft    Hyperlipidemia    Hypokalemia    Hyperglycemia  Right carotid stenosis  Right MCA stroke    Plan: After thoroughly evaluating Sherlyn Gutierrez, I believe the best course of action is to remain conservative from a vascular surgery standpoint.  She does have significant right carotid stenosis and Right MCA stroke.  She has facial droop, left upper extremity weakness and no movement of her left lower extremity.  She will need to gain some functionality back and if she does show signficant improvement we will perform right trancarotid artery revascularization.   Thank you for allowing me to participate in the care of your patient.  Please do not hesitate to call with any questions or concerns.    Suzette Candelario, FLORA   Vascular Surgery  [phone]

## 2023-07-13 NOTE — DISCHARGE PLACEMENT REQUEST
"To: [facility]    From: No FRITZNabil [phone]                Matt Sohail FRITZ (78 y.o. Female)       Date of Birth   1945    Social Security Number       Address   [address] [place] [address]    Home Phone   [phone]    MRN   [id number]       Jew   Other    Marital Status                               Admission Date   7/12/23    Admission Type   Emergency    Admitting Provider   Tejinder Plasencia MD    Attending Provider   Tejinder Plasencia MD    Department, Room/Bed   [facility] INTENSIVE CARE, I004/1       Discharge Date       Discharge Disposition       Discharge Destination                                 Attending Provider: Tejinder Plaesncia MD    Allergies: No Known Allergies    Isolation: None   Infection: None   Code Status: CPR    Ht: 167.6 cm (66\")   Wt: 56.7 kg (125 lb)    Admission Cmt: None   Principal Problem: Hypertensive urgency [I16.0]                   Active Insurance as of 7/12/2023       Primary Coverage       Payor Plan Insurance Group Employer/Plan Group    MEDICARE MEDICARE A & B        Payor Plan Address Payor Plan Phone Number Payor Plan Fax Number Effective Dates    [address] [phone]  3/1/2010 - None Entered    [place] [address]         Subscriber Name Subscriber Birth Date Member ID       SOHAIL LOPES 1945 [id number]                     Emergency Contacts        (Rel.) Home Phone Work Phone Mobile Phone    nora lopes (Spouse) -- -- [phone]              Insurance Information                  MEDICARE/MEDICARE A & B Phone: [phone]    Subscriber: Sohail Lopes Subscriber#: [id number]    Group#: -- Precert#: --          "

## 2023-07-13 NOTE — PLAN OF CARE
Goal Outcome Evaluation:     Pt. Oriented x2, varied at times. Incontinent of urine and liquid stool. NiHSS score 8. BP impoved this am at 120's/70's

## 2023-07-13 NOTE — PLAN OF CARE
Goal Outcome Evaluation:  Plan of Care Reviewed With: patient        Progress: improving  Outcome Evaluation: OT tx completed. Pt A&Ox3, disoriented to time (reports accurate year but not month). Pt completed self-feeding independently to scoop food and bring to mouth. Pt completed grooming tasks including wahing face with Zamzam, VC and Cachil DeHe to use LUE; and set-up for oral care. Pt required mod-max encouragement for AROM and AAROM exercises for LUE. Pt completed 10 reps of AAROM L shoulder with Cachil DeHe. Pt completed 10 reps of AROM L elbow flexion/extension with VC. Pt completed fine motor activity with Cachil DeHe assist for 2/4 items using LUE. Pt reported numbness and tingling in RLE and LUE throughout the treatment session. Pt required extended time for motor planning and coordination throughout the session. OT to continue POC.      Anticipated Discharge Disposition (OT): inpatient rehabilitation facility

## 2023-07-13 NOTE — THERAPY TREATMENT NOTE
Patient Name: Sherlyn Gutierrez  : 1945    MRN: [id number]                              Today's Date: 2023       Admit Date: 2023    Visit Dx:     ICD-10-CM ICD-9-CM   1. Syncope and collapse  R55 780.2   2. Dysphagia, unspecified type  R13.10 787.20   3. Decreased independence with activities of daily living [Z78.9 (ICD-10-CM)]  Z78.9 V49.89   4. Impaired mobility [Z74.09 (ICD-10-CM)]  Z74.09 799.89     Patient Active Problem List   Diagnosis    Syncope and collapse    Hypertensive urgency    Type 2 myocardial infarction due to hypertension    History of coronary artery bypass graft    Hyperlipidemia    Hypokalemia    Hyperglycemia     Past Medical History:   Diagnosis Date    Hyperlipidemia     Hypertension      Past Surgical History:   Procedure Laterality Date    IL RPR ANOM CORONARY ARTERY PULM ART ORIGIN GRAFT        General Information       Row Name 23          OT Time and Intention    Document Type therapy note (daily note)  -CS (r) RH (t) CS (c)     Mode of Treatment occupational therapy  -CS (r) RH (t) CS (c)       Row Name 23          General Information    Patient Profile Reviewed yes  -CS (r) RH (t) CS (c)     Existing Precautions/Restrictions fall  -CS (r) RH (t) CS (c)       Row Name 23          Cognition    Orientation Status (Cognition) oriented to;person;place;situation;disoriented to;time  -CS (r) RH (t) CS (c)       Row Name 23          Safety Issues, Functional Mobility    Impairments Affecting Function (Mobility) balance;endurance/activity tolerance;muscle tone abnormal;postural/trunk control;range of motion (ROM);strength  -CS (r) RH (t) CS (c)               User Key  (r) = Recorded By, (t) = Taken By, (c) = Cosigned By      Initials Name Provider Type    CS Patti Yanez, OTR/L, CNT Occupational Therapist    RH Carmen Hernandez, OT Student OT Student                     Mobility/ADL's       Row Name 23          Activities  of Daily Living    BADL Assessment/Intervention grooming;feeding  -CS (r) RH (t) CS (c)       Row Name 07/13/23 0830          Grooming Assessment/Training    [place] Level (Grooming) oral care regimen;set up;verbal cues;wash face, hands;minimum assist (75% patient effort);other (see comments)  [place] for use of LUE  -CS (r) RH (t) CS (c)     Position (Grooming) sitting up in bed;supported sitting  -CS (r) RH (t) CS (c)       Row Name 07/13/23 0830          Self-Feeding Assessment/Training    [place] Level (Feeding) scoop food and bring to mouth;independent  -CS (r) RH (t) CS (c)     Position (Self-Feeding) sitting up in bed;supported sitting  -CS (r) RH (t) CS (c)               User Key  (r) = Recorded By, (t) = Taken By, (c) = Cosigned By      Initials Name Provider Type    CS Patti Yanez, OTR/L, CNT Occupational Therapist    Carmen Garcia, OT Student OT Student                   Obj/Interventions       Row Name 07/13/23 0830          Sensory Assessment (Somatosensory)    Sensory Assessment (Somatosensory) left UE;right LE  -CS (r) RH (t) CS (c)     Left UE Sensory Assessment general sensation  -CS (r) RH (t) CS (c)     Right LE Sensory Assessment general sensation  -CS (r) RH (t) CS (c)     Sensory Subjective Reports tingling;numbness  -CS (r) RH (t) CS (c)       Row Name 07/13/23 0830          Shoulder (Therapeutic Exercise)    Shoulder (Therapeutic Exercise) AAROM (active assistive range of motion)  -CS (r) RH (t) CS (c)     Shoulder AAROM (Therapeutic Exercise) left;flexion;extension;10 repetitions  -CS (r) RH (t) CS (c)       Row Name 07/13/23 0830          Elbow/Forearm (Therapeutic Exercise)    Elbow/Forearm (Therapeutic Exercise) AROM (active range of motion)  -CS (r) RH (t) CS (c)     Elbow/Forearm AROM (Therapeutic Exercise) left;flexion;extension;10 repetitions  -CS (r) RH (t) CS (c)       Row Name 07/13/23 0830          Motor Skills    Motor Skills motor control/coordination  interventions;coordination  -CS (r) RH (t) CS (c)     Coordination finger to nose;left;WFL  -CS (r) RH (t) CS (c)     Motor Control/Coordination Interventions fine motor manipulation/dexterity activities  -CS (r) RH (t) CS (c)     Therapeutic Exercise shoulder;elbow/forearm  -CS (r) RH (t) CS (c)               User Key  (r) = Recorded By, (t) = Taken By, (c) = Cosigned By      Initials Name Provider Type    CS Patti Yanez S, OTR/L, CNT Occupational Therapist    RH Carmen Hernandez, OT Student OT Student                   Goals/Plan    No documentation.                  Clinical Impression       Row Name 07/13/23 0830          Pain Assessment    Pretreatment Pain Rating 0/10 - no pain  -CS (r) RH (t) CS (c)     Posttreatment Pain Rating 0/10 - no pain  -CS (r) RH (t) CS (c)     Pain Location - Side/Orientation Left  -CS (r) RH (t) CS (c)     Pain Location - elbow  -CS (r) RH (t) CS (c)     Pre/Posttreatment Pain Comment Pt reports pain in L elbow during active movement. Pt unable to describe pain.  -CS (r) RH (t) CS (c)     Pain Intervention(s) Ambulation/increased activity  -CS (r) RH (t) CS (c)       Row Name 07/13/23 0830          Plan of Care Review    Plan of Care Reviewed With patient  -CS (r) RH (t) CS (c)     Progress improving  -CS (r) RH (t) CS (c)     Outcome Evaluation OT tx completed. Pt A&Ox3, disoriented to time (reports accurate year but not month). Pt completed self-feeding independently to scoop food and bring to mouth. Pt completed grooming tasks including wahing face with [place], VC and [place] to use LUE; and set-up for oral care. Pt required mod-max encouragement for AROM and AAROM exercises for LUE. Pt completed 10 reps of AAROM L shoulder with [place]. Pt completed 10 reps of AROM L elbow flexion/extension with VC. Pt completed fine motor activity with [place] assist for 2/4 items using LUE. Pt reported numbness and tingling in RLE and LUE throughout the treatment session. Pt required extended time for  motor planning and coordination throughout the session. OT to continue POC.  -CS (r) RH (t) CS (c)       Row Name 07/13/23 0830          Therapy Plan Review/Discharge Plan (OT)    Anticipated Discharge Disposition (OT) inpatient rehabilitation facility  -CS (r) RH (t) CS (c)       Row Name 07/13/23 0830          Vital Signs    O2 Delivery Pre Treatment room air  -CS (r) RH (t) CS (c)     O2 Delivery Intra Treatment room air  -CS (r) RH (t) CS (c)     O2 Delivery Post Treatment room air  -CS (r) RH (t) CS (c)     Pre Patient Position Supine  -CS (r) RH (t) CS (c)     Intra Patient Position Supine  -CS (r) RH (t) CS (c)     Post Patient Position Supine  -CS (r) RH (t) CS (c)       Row Name 07/13/23 0830          Positioning and Restraints    Pre-Treatment Position in bed  -CS (r) RH (t) CS (c)     Post Treatment Position bed  -CS (r) RH (t) CS (c)     In Bed notified nsg;fowlers;call light within reach;encouraged to call for assist;side rails up x2;patient within staff view  -CS (r) RH (t) CS (c)               User Key  (r) = Recorded By, (t) = Taken By, (c) = Cosigned By      Initials Name Provider Type    CS Patti Yanez, OTR/L, CNT Occupational Therapist    Carmen Garcia, OT Student OT Student                   Outcome Measures       Row Name 07/13/23 0830          How much help from another is currently needed...    Putting on and taking off regular lower body clothing? 1  -CS (r) RH (t) CS (c)     Bathing (including washing, rinsing, and drying) 2  -CS (r) RH (t) CS (c)     Toileting (which includes using toilet bed pan or urinal) 2  -CS (r) RH (t) CS (c)     Putting on and taking off regular upper body clothing 2  -CS (r) RH (t) CS (c)     Taking care of personal grooming (such as brushing teeth) 3  -CS (r) RH (t) CS (c)     Eating meals 3  -CS (r) RH (t) CS (c)     AM-PAC 6 Clicks Score (OT) 13  -CS (r) RH (t)       Row Name 07/13/23 0830 07/13/23 0800       How much help from another person do you  currently need...    Turning from your back to your side while in flat bed without using bedrails? 3  -HT 3  -OKSANA    Moving from lying on back to sitting on the side of a flat bed without bedrails? 3  -HT 3  -OKSANA    Moving to and from a bed to a chair (including a wheelchair)? 1  -HT 1  -OKSANA    Standing up from a chair using your arms (e.g., wheelchair, bedside chair)? 1  -HT 1  -OKSANA    Climbing 3-5 steps with a railing? 1  -HT 1  -OKSANA    To walk in hospital room? 1  -HT 1  -OKSANA    AM-PAC 6 Clicks Score (PT) 10  -HT 10  -OKSANA    Highest level of mobility 4 --> Transferred to chair/commode  -HT 4 --> Transferred to chair/commode  -OKSANA      Row Name 07/13/23 0830          Modified Wetzel Scale    Pre-Stroke Modified [place] Scale 0 - No Symptoms at all.  -CS (r) RH (t) CS (c)     Modified [place] Scale 5 - Severe disability.  Bedridden, incontinent, and requiring constant nursing care and attention.  -CS (r) RH (t) CS (c)       Row Name 07/13/23 0830          Functional Assessment    Outcome Measure Options AM-PAC 6 Clicks Daily Activity (OT)  -CS (r) RH (t) CS (c)               User Key  (r) = Recorded By, (t) = Taken By, (c) = Cosigned By      Initials Name Provider Type    [place] Bessy Haq, PTA Physical Therapist Assistant    Patti Dawson, OTR/L, CNT Occupational Therapist     Polly Burnett, RN Registered Nurse     Carmen Hernandez, OT Student OT Student                    Occupational Therapy Education       Title: PT OT SLP Therapies (In Progress)       Topic: Occupational Therapy (In Progress)       Point: ADL training (In Progress)       Description:   Instruct learner(s) on proper safety adaptation and remediation techniques during self care or transfers.   Instruct in proper use of assistive devices.                  Learning Progress Summary             Patient  by  at 7/13/2023 0922    Acceptance, E,D,TB, NR by  at 7/13/2023 0830    Acceptance, E, NR by  at 7/12/2023 1116                          Point: Home exercise program (Not Started)       Description:   Instruct learner(s) on appropriate technique for monitoring, assisting and/or progressing therapeutic exercises/activities.                  Learner Progress:  Not documented in this visit.              Point: Precautions (In Progress)       Description:   Instruct learner(s) on prescribed precautions during self-care and functional transfers.                  Learning Progress Summary             Patient Acceptance, E, NR by EVAN at 7/12/2023 1116                         Point: Body mechanics (Not Started)       Description:   Instruct learner(s) on proper positioning and spine alignment during self-care, functional mobility activities and/or exercises.                  Learner Progress:  Not documented in this visit.                              User Key       Initials Effective Dates Name Provider Type Discipline    EVAN 07/11/23 -  Caren Colón OTR/L, CSRS Occupational Therapist OT     05/03/23 -  Carmen Hernandez OT Student OT Student OT                  OT Recommendation and Plan     Plan of Care Review  Plan of Care Reviewed With: patient  Progress: improving  Outcome Evaluation: OT tx completed. Pt A&Ox3, disoriented to time (reports accurate year but not month). Pt completed self-feeding independently to scoop food and bring to mouth. Pt completed grooming tasks including wahing face with [place], VC and [place] to use LUE; and set-up for oral care. Pt required mod-max encouragement for AROM and AAROM exercises for LUE. Pt completed 10 reps of AAROM L shoulder with [place]. Pt completed 10 reps of AROM L elbow flexion/extension with VC. Pt completed fine motor activity with [place] assist for 2/4 items using LUE. Pt reported numbness and tingling in RLE and LUE throughout the treatment session. Pt required extended time for motor planning and coordination throughout the session. OT to continue POC.     Time Calculation:    Time Calculation- OT        Row Name 07/13/23 0830             Time Calculation- OT    OT Start Time 0827  -CS (r) RH (t) CS (c)      OT Stop Time 0905  -CS (r) RH (t) CS (c)      OT Time Calculation (min) 38 min  -CS (r) RH (t)      Total Timed Code Minutes- OT 38 minute(s)  -CS (r) RH (t) CS (c)      OT Received On 07/13/23  -CS (r) RH (t) CS (c)         Timed Charges    77926 - OT Therapeutic Exercise Minutes 15  -CS (r) RH (t) CS (c)      90815 - OT Therapeutic Activity Minutes --  -CS (r) RH (t) CS (c)      08126 - OT Self Care/Mgmt Minutes 23  -CS (r) RH (t) CS (c)         Total Minutes    Timed Charges Total Minutes 38  -CS (r) RH (t)       Total Minutes 38  -CS (r) RH (t)                User Key  (r) = Recorded By, (t) = Taken By, (c) = Cosigned By      Initials Name Provider Type    CS Patti Yanez, OTR/L, CNT Occupational Therapist    Carmen Garcia, OT Student OT Student                           Carmen Hernandez, OT Student  7/13/2023

## 2023-07-13 NOTE — PROGRESS NOTES
Neurology Progress Note      Patient: Sherlyn Gutierrez (78 y.o. female)  MRN: [id number]  : 1945    Subjective:   NAOE. BP improved, now off cardene gtt. Patient remains confused, however, improved from yesterday. Unable to state her age, the year, or the current [place] president.     Current Medications:    Current Facility-Administered Medications:     acetaminophen (TYLENOL) tablet 650 mg, 650 mg, Oral, Q4H PRN, 650 mg at 23 2248 **OR** acetaminophen (TYLENOL) 160 MG/5ML solution 650 mg, 650 mg, Oral, Q4H PRN **OR** [DISCONTINUED] acetaminophen (TYLENOL) suppository 650 mg, 650 mg, Rectal, Q4H PRN, Igor Luevano DO    aluminum-magnesium hydroxide-simethicone (MAALOX MAX) 400-400-40 MG/5ML suspension 7.5 mL, 7.5 mL, Oral, Q4H PRN, Tay Ernandez MD    aspirin EC tablet 81 mg, 81 mg, Oral, Daily, Igor Luevano DO, 81 mg at 23 0837    atorvastatin (LIPITOR) tablet 80 mg, 80 mg, Oral, Nightly, Tay Ernandez MD, 80 mg at 23 2157    bisacodyl (DULCOLAX) suppository 10 mg, 10 mg, Rectal, Daily PRN, Tay Ernandez MD    [COMPLETED] clopidogrel (PLAVIX) tablet 300 mg, 300 mg, Oral, Once, 300 mg at 23 1014 **AND** clopidogrel (PLAVIX) tablet 75 mg, 75 mg, Oral, Daily, Tay Ernandez MD, 75 mg at 23 0847    lisinopril (PRINIVIL,ZESTRIL) tablet 20 mg, 20 mg, Oral, Q24H, Igor Luevano DO, 20 mg at 23 0837    melatonin tablet 5 mg, 5 mg, Oral, Nightly PRN, Igor Luevano DO, 5 mg at 23 2248    niCARdipine (CARDENE) 25 mg in 250 mL NS infusion, 5-15 mg/hr, Intravenous, Titrated, Tejinder Plasencia MD, Stopped at 23 0706    ondansetron (ZOFRAN) injection 4 mg, 4 mg, Intravenous, Q6H PRN, Igor Luevano, DO    sodium chloride 0.9 % flush 10 mL, 10 mL, Intravenous, PRN, Jose Luis Vang, DO    sodium chloride 0.9 % flush 10 mL, 10 mL, Intravenous, PRN, Igor Luevano, DO    sodium chloride 0.9 % flush 10 mL, 10 mL, Intravenous, Q12H,  Tay Ernandez MD, 10 mL at 07/13/23 0837    sodium chloride 0.9 % flush 10 mL, 10 mL, Intravenous, PRNAwais Amartyadeb, MD    sodium chloride 0.9 % infusion 40 mL, 40 mL, Intravenous, PRN, Igor Luevano DO    sodium chloride 0.9 % infusion 40 mL, 40 mL, Intravenous, PRN, Tay Ernandez MD     Physical Examination:   Vital Signs:  Vitals:    07/13/23 0730 07/13/23 0800 07/13/23 0830 07/13/23 0837   BP: 159/80 136/72 118/74 118/74   Patient Position:  Lying     Pulse: 88 82 83 91   Resp:  18     Temp:  98.5 °F (36.9 °C)     TempSrc:  Oral     SpO2: 98% 97% 97% 97%   Weight:       Height:           General Examination:  Constitutional:  Well-developed/Well norished.  Eyes:   No scleral icterus.  HENT:   Normocephalic/atraumatic.   Neck/Back:  Supple w/ full range of motion.  Cardiovascular:  Tachycardic, regular rhythm.   Respiratory:   No stridor, no respiratory distress.  Gastrointestinal:  Soft. No distension or tenderness.  Musculoskeletal:  No edema or tenderness.    Skin:    Warm and dry. No rash noted.   Psychiatric:   Insight inadequate, mood calm.     Neurological Examination:  Mental status:   A/OX2. Unable to state age or date.   Follows simple 1-step commands.  Language/speech: Fluent w/o dysarthria. Comprehension intact.   Cranial nerves:  VFs full to confrontation.      Gaze conjugate w/o deviation. EOMI.     V1-3 equal to light touch.      Facial symmetry.     Hearing equal bilaterally to finger rub.     Symmetric palate rise .     Tongue midline.  Motor system:  Muscle tone and appearance are normal. No contractures.     RUE: Antigravity w/o drift.     LUE: Antigravity w/ drift.     RLE: Antigravity w/o drift.     LLE: No effort against gravity.  Sensory:   Withdraws to pain in all extremities. L sided neglect.  Coordination:   No tremor or abnormal movements.  Gait:    Unsafe to ambulate  Recent Diagnostics:   Laboratory Results:   - Reviewed in EMR  Lab Results   Component Value  Date    GLUCOSE 188 (H) 07/13/2023    CALCIUM 9.2 07/13/2023     07/13/2023    K 3.8 07/13/2023    CO2 22.0 07/13/2023     07/13/2023    BUN 11 07/13/2023    CREATININE 0.84 07/13/2023    BCR 13.1 07/13/2023    ANIONGAP 13.0 07/13/2023     Lab Results   Component Value Date    WBC 11.65 (H) 07/13/2023    HGB 12.7 07/13/2023    HCT 39.7 07/13/2023    MCV 93.0 07/13/2023     07/13/2023     Lab Results   Component Value Date    PTT 27.6 07/13/2023    INR 0.98 07/13/2023     Lab Results   Component Value Date    TRIG 136 07/13/2023    HDL 40 07/13/2023     (H) 07/13/2023     Lab Results   Component Value Date    HGBA1C 7.00 (H) 07/12/2023       Imaging Results:  Imaging reviewed personally in EMR/RAPID AI.    CT BRAIN - no acute intracranial abnormalities.  CTA HEAD/NECK- Densely calcified plaque of the proximal internal carotid arteries w/ 70% stenosis of the proximal right ICA and 60% stenosis proximal left ICA.  MRI BRAIN - R frontal ischemia, CSVD  TTE -  Results for orders placed during the hospital encounter of 07/12/23    Adult Transthoracic Echo Complete W/ Cont if Necessary Per Protocol    Interpretation Summary    Left ventricular systolic function is normal. Left ventricular ejection fraction appears to be 56 - 60%.    Left ventricular wall thickness is consistent with moderate to severe septal asymmetric hypertrophy.    The findings are consistent with nonobstructive hypertrophic cardiomyopathy.    Left ventricular diastolic function is consistent with (grade I) impaired relaxation.    Estimated right ventricular systolic pressure from tricuspid regurgitation is normal (<35 mmHg).    Saline test is technically difficult but grossly normal    Assessment & Plan:   Patient is a 78 y.o. woman with a PMH significant for HTN, HLD, and CAD s/p CABG who presented with AMS and was found to have b/l carotid stenosis an acute RMCA territory ischemic stroke. On arrival patient was severely  hypertensive, was stared on a cardine gtt and transfer to the ICU. Patient blood pressure is now improved.    Neurologic Problems:  1) R MCA territory ischemic stroke  2) L sided weakness and neglect  3) B/L carotid stenosis (R>L)  4) Encephalopathy in setting of CVA    Plan:   - Transfer patient to the floor  - Continue current BP medications as prescribed with a systolic BP goal of <130.  - Continue taking atorvastatin 80mg for cholesterol reduction with a LDL goal of < 70.  - Continue current DM medications as prescribed w/ strict glycemic control.    - Continue taking ASA 81mg and Plavix for b/l carotid stenosis.  - Vascular surgery consulted for future CEA  - PT/OT, patient will likely need IPR    Jazmin Abraham MD  07/13/23  08:58 CDT

## 2023-07-13 NOTE — PLAN OF CARE
Goal Outcome Evaluation:      Patient was able to perform supine to sit with Min assist and HOB elevated. Posterior lean and LOB. Worked on finding mid line. Patient only able to support self in sitting  while holding a slight anterior posture. Patient able to work thru R LE ex's. L LE ex's were passive. Patient was able to reach all directions with R UE and maintain balance. She was also able to work thru trunk stabilization activities. slow to grasp but when she understood she pushed against resistance. 4 attempts to stand, Max assist and not able to fully stand. Patient will benefit from continued P.T. for strengthening, balance and working towards gait training.

## 2023-07-13 NOTE — THERAPY TREATMENT NOTE
Acute Care - Physical Therapy Treatment Note  [facility]     Patient Name: Sherlyn Gutierrez  : 1945  MRN: [id number]  Today's Date: 2023      Visit Dx:     ICD-10-CM ICD-9-CM   1. Syncope and collapse  R55 780.2   2. Dysphagia, unspecified type  R13.10 787.20   3. Decreased independence with activities of daily living [Z78.9 (ICD-10-CM)]  Z78.9 V49.89   4. Impaired mobility [Z74.09 (ICD-10-CM)]  Z74.09 799.89     Patient Active Problem List   Diagnosis    Syncope and collapse    Hypertensive urgency    Type 2 myocardial infarction due to hypertension    History of coronary artery bypass graft    Hyperlipidemia    Hypokalemia    Hyperglycemia     Past Medical History:   Diagnosis Date    Hyperlipidemia     Hypertension      Past Surgical History:   Procedure Laterality Date    CO RPR ANOM CORONARY ARTERY PULM ART ORIGIN GRAFT       PT Assessment (last 12 hours)       PT Evaluation and Treatment       Row Name 23 0739          Physical Therapy Time and Intention    Subjective Information complains of;pain  -OKSANA     Document Type therapy note (daily note)  -OKSANA     Mode of Treatment physical therapy  -OKSANA     Comment dona memory  -       Row Name 2339          General Information    Existing Precautions/Restrictions fall  left neglect  -OKSANA       Row Name 2339          Pain Scale: FACES Pre/Post-Treatment    Posttreatment Pain Rating 2-->hurts little bit  -OKSANA     Pain Location - Side/Orientation Left  -OKSANA     Pain Location - wrist  IV site  -OKSANA       Row Name 23 0739          Cognition    Orientation Status (Cognition) oriented to;person;verbal cues/prompts needed for orientation  -OKSANA       Row Name 23 0739          Bed Mobility    Supine-Sit [place] (Bed Mobility) verbal cues;minimum assist (75% patient effort)  L LE  -OKSANA     Sit-Supine [place] (Bed Mobility) moderate assist (50% patient effort)  LE's  -OKSANA     Assistive Device (Bed Mobility) head of bed elevated   -[facility] Name 07/13/23 0739          Transfers    Comment, (Transfers) 4 attempts, not able to fully stand  -OKSANA       Row Name 07/13/23 0739          Sit-Stand Transfer    Sit-Stand [place] (Transfers) maximum assist (25% patient effort)  not able to fully stand  -OKSANA       Row Name 07/13/23 0739          Stand-Sit Transfer    Stand-Sit [place] (Transfers) minimum assist (75% patient effort)  -OKSANA       Row Name 07/13/23 0739          Balance    Static Sitting Balance minimal assist;contact guard  not able to maintain neutral but if slightly leaning anteriorly she can support self in sitting.  -     Dynamic Sitting Balance contact guard;minimal assist  reached all directions with R UE.  -     Position, Sitting Balance sitting edge of bed  -     Comment, Balance slow to respond to trunk stabs but able when understood directions.  -OKSANA       Row Name 07/13/23 0739          Motor Skills    Therapeutic Exercise shoulder;elbow/forearm;hip;knee;ankle  -OKSANA       Row Name 07/13/23 0739          Shoulder (Therapeutic Exercise)    Shoulder (Therapeutic Exercise) AAROM (active assistive range of motion)  -     Shoulder AAROM (Therapeutic Exercise) left;flexion;extension;aBduction;aDduction  -OKSANA       Row Name 07/13/23 0739          Elbow/Forearm (Therapeutic Exercise)    Elbow/Forearm (Therapeutic Exercise) AAROM (active assistive range of motion);AROM (active range of motion)  -     Elbow/Forearm AROM (Therapeutic Exercise) bilateral;flexion;extension  -     Elbow/Forearm AAROM (Therapeutic Exercise) left;flexion;extension  -OKSANA       Row Name 07/13/23 0739          Hip (Therapeutic Exercise)    Hip (Therapeutic Exercise) AROM (active range of motion);PROM (passive range of motion)  -     Hip AROM (Therapeutic Exercise) right;flexion;aBduction;aDduction  -     Hip PROM (Therapeutic Exercise) left;flexion;aBduction;aDduction  -OKSANA       Row Name 07/13/23 0739          Knee (Therapeutic Exercise)     Knee (Therapeutic Exercise) AROM (active range of motion);PROM (passive range of motion)  -     Knee AROM (Therapeutic Exercise) right;LAQ (long arc quad)  -     Knee PROM (Therapeutic Exercise) left  knee ext  -       Row Name 07/13/23 0739          Ankle (Therapeutic Exercise)    Ankle (Therapeutic Exercise) AROM (active range of motion);PROM (passive range of motion)  -     Ankle AROM (Therapeutic Exercise) right;dorsiflexion;plantarflexion  -     Ankle PROM (Therapeutic Exercise) left;dorsiflexion;plantarflexion  -       Row Name 07/13/23 0739          Positioning and Restraints    Pre-Treatment Position in bed  -     In Bed notified nsg;fowlers;call light within reach;encouraged to call for assist  set up breakfast  -               User Key  (r) = Recorded By, (t) = Taken By, (c) = Cosigned By      Initials Name Provider Type    OKSANA Bessy Haq PTA Physical Therapist Assistant                    Physical Therapy Education       Title: PT OT SLP Therapies (In Progress)       Topic: Physical Therapy (In Progress)       Point: Mobility training (In Progress)       Learning Progress Summary             Patient Acceptance, E, NR by  at 7/13/2023 0854    Comment: bed mobility                         Point: Home exercise program (Not Started)       Learner Progress:  Not documented in this visit.              Point: Body mechanics (Not Started)       Learner Progress:  Not documented in this visit.              Point: Precautions (Done)       Learning Progress Summary             Patient Acceptance, E, VU by  at 7/12/2023 1102    Comment: Educated pt. on not getting out of bed and needing assitance.                                         User Key       Initials Effective Dates Name Provider Type [facility] 02/03/23 -  Bessy Haq PTA Physical Therapist Assistant PT     05/25/23 -  Laure Ornelas PT Student PT Student PT                  PT Recommendation and Plan          Outcome Measures       Row Name 07/13/23 0800             How much help from another person do you currently need...    Turning from your back to your side while in flat bed without using bedrails? 3  -OKSANA      Moving from lying on back to sitting on the side of a flat bed without bedrails? 3  -OKSANA      Moving to and from a bed to a chair (including a wheelchair)? 1  -OKSANA      Standing up from a chair using your arms (e.g., wheelchair, bedside chair)? 1  -OKSANA      Climbing 3-5 steps with a railing? 1  -OKSANA      To walk in hospital room? 1  -OKSANA      AM-PAC 6 Clicks Score (PT) 10  -OKSANA                User Key  (r) = Recorded By, (t) = Taken By, (c) = Cosigned By      Initials Name Provider Type    Bessy Joseph PTA Physical Therapist Assistant                     Time Calculation:    PT Charges       Row Name 07/13/23 0859             Time Calculation    Start Time 0739  -OKSANA      Stop Time 0807  -OKSANA      Time Calculation (min) 28 min  -OKSANA         Timed Charges    25066 - PT Therapeutic Activity Minutes 28  -OKSANA         Total Minutes    Timed Charges Total Minutes 28  -OKSANA       Total Minutes 28  -OKSANA                User Key  (r) = Recorded By, (t) = Taken By, (c) = Cosigned By      Initials Name Provider Type    Bessy Joseph PTA Physical Therapist Assistant                  Therapy Charges for Today       Code Description Service Date Service Provider Modifiers Qty    [id number]  PT THERAPEUTIC ACT EA 15 MIN 7/13/2023 Bessy Haq PTA GP 2            PT G-Codes  Outcome Measure Options: AM-PAC 6 Clicks Basic Mobility (PT), Modified [place]  AM-PAC 6 Clicks Score (PT): 10  AM-PAC 6 Clicks Score (OT): 14  Modified [place] Scale: 5 - Severe disability.  Bedridden, incontinent, and requiring constant nursing care and attention.    Bessy Haq PTA  7/13/2023

## 2023-07-13 NOTE — CASE MANAGEMENT/SOCIAL WORK
Continued Stay Note   [place]     Patient Name: Sherlyn Gutierrez  MRN: [id number]  Today's Date: 7/13/2023    Admit Date: 7/12/2023    Plan: Referral to [facility]   Discharge Plan       Row Name 07/13/23 0950       Plan    Plan Referral to [facility]    Patient/Family in Agreement with Plan yes    Plan Comments SW spoke with patient's spouse yesterday and he was in agreement with referral to [facility].  Referral made, awaiting response.                   Discharge Codes    No documentation.                       KYA JorgeW

## 2023-07-13 NOTE — PLAN OF CARE
Goal Outcome Evaluation:              Outcome Evaluation: pt transferred from ICU this shift. a&ox4 with frequent forgetfulness. repetitive statements and questions. appears to get fixated on a topic. daughter at bedside. tele. SCD.

## 2023-07-13 NOTE — PROGRESS NOTES
[facility] Medicine Services  INPATIENT PROGRESS NOTE    Patient Name: Sherlyn Gutierrez  Date of Admission: 7/12/2023  Today's Date: 07/13/23  Length of Stay: 1  Primary Care Physician: Ilia Serra MD    Subjective   Chief Complaint: follow up stroke  HPI   Doing ok.  Still has left sided weakness.  Left arm is weak.  She is unable to move her left leg.    Vascular surgery evaluated her, wants to give her time for neurological recovery prior to Carotid intervention    She is a bit confused today        Review of Systems   Unable to perform ROS: Mental status change        All pertinent negatives and positives are as above. All other systems have been reviewed and are negative unless otherwise stated.     Objective    Temp:  [97.9 °F (36.6 °C)-98.9 °F (37.2 °C)] 98.4 °F (36.9 °C)  Heart Rate:  [64-91] 71  Resp:  [16-20] 20  BP: ()/() 135/59  Physical Exam  Constitutional:       Appearance: Normal appearance. She is well-developed.   HENT:      Head: Normocephalic and atraumatic.      Right Ear: Tympanic membrane, ear canal and external ear normal.      Left Ear: Tympanic membrane, ear canal and external ear normal.      Nose: Nose normal.      Mouth/Throat:      Mouth: Mucous membranes are moist.      Pharynx: Oropharynx is clear.   Eyes:      Extraocular Movements: Extraocular movements intact.      Conjunctiva/sclera: Conjunctivae normal.      Pupils: Pupils are equal, round, and reactive to light.   Cardiovascular:      Rate and Rhythm: Normal rate and regular rhythm.      Heart sounds: Normal heart sounds.   Pulmonary:      Effort: Pulmonary effort is normal.      Breath sounds: Normal breath sounds.   Abdominal:      General: Bowel sounds are normal. There is no distension.      Palpations: Abdomen is soft.      Tenderness: There is no abdominal tenderness.   Musculoskeletal:         General: Normal range of motion.      Cervical back: Normal range of motion  and neck supple.   Skin:     General: Skin is warm and dry.   Neurological:      Mental Status: She is alert and oriented to person, place, and time.      Comments: LUE weakness  LLE flaccid   Psychiatric:         Mood and Affect: Mood normal.         Speech: Speech normal.         Behavior: Behavior normal.         Thought Content: Thought content normal.         Judgment: Judgment normal.         Results Review:  I have reviewed the labs, radiology results, and diagnostic studies.    Laboratory Data:   Results from last 7 days   Lab Units 07/13/23  0418 07/12/23  0052   WBC 10*3/mm3 11.65* 11.54*   HEMOGLOBIN g/dL 12.7 12.2   HEMATOCRIT % 39.7 37.3   PLATELETS 10*3/mm3 215 208        Results from last 7 days   Lab Units 07/13/23  0418 07/12/23  0811 07/12/23  0052   SODIUM mmol/L 140 142 141   POTASSIUM mmol/L 3.8 3.7 3.0*   CHLORIDE mmol/L 105 107 105   CO2 mmol/L 22.0 23.0 24.0   BUN mg/dL 11 7* 7*   CREATININE mg/dL 0.84 0.69 0.77   CALCIUM mg/dL 9.2 8.6 9.2   BILIRUBIN mg/dL 0.9  --  0.6   ALK PHOS U/L 109  --  108   ALT (SGPT) U/L 9  --  8   AST (SGOT) U/L 23  --  14   GLUCOSE mg/dL 188* 200* 209*       Culture Data:   No results found for: BLOODCX, URINECX, WOUNDCX, MRSACX, RESPCX, STOOLCX    Radiology Data:   Imaging Results (Last 24 Hours)       Procedure Component Value Units Date/Time    CT Angiogram Neck [[id number]] Collected: 07/12/23 1334     Updated: 07/12/23 1346    Narrative:      CT ANGIOGRAM NECK- 7/12/2023 10:53 AM CDT     HISTORY: TYLER blockage; R55-Syncope and collapse; R13.10-Dysphagia,  unspecified      COMPARISON: None     DOSE LENGTH PRODUCT: 308 mGy cm. Automated exposure control was also  utilized to decrease patient radiation dose.     TECHNIQUE: Axial images the neck are performed following IV contrast.  2-D and maximal intensity projectional reconstructed images reviewed     FINDINGS:  Median sternotomy wires. Mild calcified plaque within the  normal caliber aortic arch. Mild  calcification of the proximal left  common and subclavian arteries creating no significant luminal stenosis.  Right brachiocephalic artery is patent. No flow-limiting subclavian  artery stenosis. Tortuosity of the proximal left common carotid artery.      There is moderate densely calcified plaque of the right carotid bulb  extending into the proximal right internal carotid artery resulting in  65-70% stenosis of the proximal right internal carotid artery. Mid and  distal right internal carotid artery are patent.     There is mild to moderate densely calcified plaque of the left proximal  internal carotid artery resulting in 60% stenosis.     The vertebral arteries originate from the proximal subclavian arteries  as expected. Hypoplastic right vertebral artery. Prominent tapering of  the distal bilateral feet 2 bilateral wrist with small caliber basilar  artery.     Heterogeneous thyroid with no dominant nodule. No pathologic  lymphadenopathy or suspicious soft tissue mass identified in the neck.  Moderate to advanced degenerative disc change at the C4/C5 through C6/C7  level.     Visible lung apices are unremarkable. Median sternotomy wires.       Impression:      1. Densely calcified plaque of the proximal internal carotid arteries  resulting in 70% stenosis of the proximal right ICA and 60% stenosis  proximal left ICA.  2. Hypoplastic right vertebral artery. Small caliber basilar artery  system.  3. No aneurysm or dissection.  This report was finalized on 07/12/2023 13:42 by Dr. Caren Aguilar MD.    CT Angiogram Head [[id number]] Collected: 07/12/23 1304     Updated: 07/12/23 1313    Narrative:      CT ANGIOGRAM HEAD- 7/12/2023 10:53 AM CDT     HISTORY: RPCA /R MCA blockage; R55-Syncope and collapse;  R13.10-Dysphagia, unspecified      COMPARISON: None     DOSE LENGTH PRODUCT: 308 mGy cm. Automated exposure control was also  utilized to decrease patient radiation dose.     TECHNIQUE: Axial images the brain are  obtained following IV contrast.  2-D and maximal intensity projectional reconstructed images reviewed     FINDINGS:  The distal internal carotid arteries demonstrate mild  calcification of the cavernous segment of the right internal carotid  artery resulting in 50% or less stenosis. The bilateral proximal and mid  anterior and middle cerebral arteries are patent. Decreased enhancement  of the distal branches within the anterior watershed distribution of the  right frontal region up towards the vertex. Small caliber  vertebrobasilar system. Fetal origin left posterior cerebral artery from  the anterior circulation. Small caliber proximal right posterior  cerebral artery. Patent right posterior communicating artery. No  aneurysm or dissection identified.     No abnormal enhancing intracranial lesion.       Impression:      1. No central large vessel occlusion. Decreased enhancement of the  distal branches of the right middle cerebral artery involving anterior  watershed distribution up towards the vertex. No aneurysm or dissection.  Small caliber vertebrobasilar system. Fetal origin left posterior  cerebral artery.  This report was finalized on 07/12/2023 13:10 by Dr. Caren Aguilar MD.            I have reviewed the patient's current medications.     Assessment/Plan   Assessment  Active Hospital Problems    Diagnosis     **Hypertensive urgency     Syncope and collapse     Type 2 myocardial infarction due to hypertension     History of coronary artery bypass graft     Hyperlipidemia     Hypokalemia     Hyperglycemia        Treatment Plan  1.  Stroke  -ASA  -plavix   -Statin    2.  Hypertensive Emergency  -resolved    3.  CAD  -ASA  -plavix  -statin    4.  HLD    5.  Carotid Stenosis  -ASA  -Plavix  -Statin  -Vascular Surgery consulted    Medical Decision Making  Number and Complexity of problems: 5 problems, high complexity--stroke represents threat to function    Risk:  Moderate:  Rx drug management    Differential  Diagnosis: stroke, carotid stenosis    Conditions and Status        Condition is unchanged.     MDM Data  1:  Tests/Documents/Independent Historians: (3)  A:  Prior external notes from unique source reviewed:  B.  Review of the Results of Each Unique Test:  (3) CBC, CMP, Lipid Panel  C.  Assessment requiring an independent Historian:    2.  Independent interpretation of Tests:  A.  Radiology Interpretation: (1) MRI Brain:  Acute ischemic stroke   B:  EKG/Telemetry Interpretation: (1) NSR    3:  Discussion of management or Test interpretation:  (1) Discussed with Dr. Quiñonez of vascular surgery      Care Planning  Shared decision making: patient/family agreeable to treatment plan  Code status and discussions: full code    Disposition  Social Determinants of Health that impact treatment or disposition: n/a  I expect the patient to be discharged to rehab in 2-3 days    Electronically signed by Tejinder Plasencia MD, 07/13/23, 12:31 CDT.

## 2023-07-14 LAB
GLUCOSE BLDC GLUCOMTR-MCNC: 144 MG/DL (ref 70–130)
GLUCOSE BLDC GLUCOMTR-MCNC: 165 MG/DL (ref 70–130)
GLUCOSE BLDC GLUCOMTR-MCNC: 180 MG/DL (ref 70–130)

## 2023-07-14 PROCEDURE — 97530 THERAPEUTIC ACTIVITIES: CPT

## 2023-07-14 PROCEDURE — 92526 ORAL FUNCTION THERAPY: CPT

## 2023-07-14 PROCEDURE — 97535 SELF CARE MNGMENT TRAINING: CPT

## 2023-07-14 PROCEDURE — 82948 REAGENT STRIP/BLOOD GLUCOSE: CPT

## 2023-07-14 PROCEDURE — 99232 SBSQ HOSP IP/OBS MODERATE 35: CPT | Performed by: STUDENT IN AN ORGANIZED HEALTH CARE EDUCATION/TRAINING PROGRAM

## 2023-07-14 PROCEDURE — 92523 SPEECH SOUND LANG COMPREHEN: CPT

## 2023-07-14 PROCEDURE — 97110 THERAPEUTIC EXERCISES: CPT

## 2023-07-14 RX ORDER — TEMAZEPAM 15 MG/1
15 CAPSULE ORAL NIGHTLY PRN
Status: DISCONTINUED | OUTPATIENT
Start: 2023-07-14 | End: 2023-07-17

## 2023-07-14 RX ADMIN — Medication 10 ML: at 08:09

## 2023-07-14 RX ADMIN — ACETAMINOPHEN 650 MG: 325 TABLET, FILM COATED ORAL at 08:06

## 2023-07-14 RX ADMIN — ACETAMINOPHEN 650 MG: 325 TABLET, FILM COATED ORAL at 21:16

## 2023-07-14 RX ADMIN — CLOPIDOGREL BISULFATE 75 MG: 75 TABLET, FILM COATED ORAL at 08:06

## 2023-07-14 RX ADMIN — LISINOPRIL 20 MG: 20 TABLET ORAL at 08:06

## 2023-07-14 RX ADMIN — TEMAZEPAM 15 MG: 15 CAPSULE ORAL at 22:20

## 2023-07-14 RX ADMIN — Medication 10 ML: at 21:16

## 2023-07-14 RX ADMIN — ATORVASTATIN CALCIUM 80 MG: 40 TABLET ORAL at 21:16

## 2023-07-14 RX ADMIN — ASPIRIN 81 MG: 81 TABLET, COATED ORAL at 08:06

## 2023-07-14 RX ADMIN — Medication 5 MG: at 21:16

## 2023-07-14 NOTE — CASE MANAGEMENT/SOCIAL WORK
Continued Stay Note   [place]     Patient Name: Sherlyn Gutierrez  MRN: [id number]  Today's Date: 7/14/2023    Admit Date: 7/12/2023    Plan: Referral to [facility]   Discharge Plan       Row Name 07/14/23 1115       Plan    Plan Referral to [facility]    Plan Comments Donaldo says that she will need to evaluate patient on Monday so that she can review therapy updates from over weekend.      Row Name 07/14/23 0699       Plan    Plan Referral to [facility]    Plan Comments [facility]. Will await decision.                    RUSS Crockett

## 2023-07-14 NOTE — PLAN OF CARE
Goal Outcome Evaluation:  Plan of Care Reviewed With: patient        Progress: no change  Outcome Evaluation: A&Ox3, disoriented to situation. C/o pain x1 this shift. Voids frequently per bedpan. NIH=7. Tele. SCDs. Appears to be resting well between care. Call light in reach, safety maintained.

## 2023-07-14 NOTE — PROGRESS NOTES
[facility] Medicine Services  INPATIENT PROGRESS NOTE    Patient Name: Sherlyn Gutierrez  Date of Admission: 7/12/2023  Today's Date: 07/14/23  Length of Stay: 2  Primary Care Physician: Ilia Serra MD    Subjective   Chief Complaint: follow up stroke  HPI   Doing about the same.  Left sided weakness is the same.  She is still confused        Review of Systems   Unable to perform ROS: Mental status change        All pertinent negatives and positives are as above. All other systems have been reviewed and are negative unless otherwise stated.     Objective    Temp:  [98 °F (36.7 °C)-98.9 °F (37.2 °C)] 98.1 °F (36.7 °C)  Heart Rate:  [] 83  Resp:  [16-20] 16  BP: (136-188)/(61-81) 174/73  Physical Exam  Constitutional:       Appearance: Normal appearance. She is well-developed.   HENT:      Head: Normocephalic and atraumatic.      Right Ear: Tympanic membrane, ear canal and external ear normal.      Left Ear: Tympanic membrane, ear canal and external ear normal.      Nose: Nose normal.      Mouth/Throat:      Mouth: Mucous membranes are moist.      Pharynx: Oropharynx is clear.   Eyes:      Extraocular Movements: Extraocular movements intact.      Conjunctiva/sclera: Conjunctivae normal.      Pupils: Pupils are equal, round, and reactive to light.   Cardiovascular:      Rate and Rhythm: Normal rate and regular rhythm.      Heart sounds: Normal heart sounds.   Pulmonary:      Effort: Pulmonary effort is normal.      Breath sounds: Normal breath sounds.   Abdominal:      General: Bowel sounds are normal. There is no distension.      Palpations: Abdomen is soft.      Tenderness: There is no abdominal tenderness.   Musculoskeletal:         General: Normal range of motion.      Cervical back: Normal range of motion and neck supple.   Skin:     General: Skin is warm and dry.   Neurological:      Mental Status: She is alert. She is disoriented.      Comments: [place]ER PEÑA  flaccid         Results Review:  I have reviewed the labs, radiology results, and diagnostic studies.    Laboratory Data:   Results from last 7 days   Lab Units 07/13/23  0418 07/12/23  0052   WBC 10*3/mm3 11.65* 11.54*   HEMOGLOBIN g/dL 12.7 12.2   HEMATOCRIT % 39.7 37.3   PLATELETS 10*3/mm3 215 208          Results from last 7 days   Lab Units 07/13/23  0418 07/12/23  0811 07/12/23  0052   SODIUM mmol/L 140 142 141   POTASSIUM mmol/L 3.8 3.7 3.0*   CHLORIDE mmol/L 105 107 105   CO2 mmol/L 22.0 23.0 24.0   BUN mg/dL 11 7* 7*   CREATININE mg/dL 0.84 0.69 0.77   CALCIUM mg/dL 9.2 8.6 9.2   BILIRUBIN mg/dL 0.9  --  0.6   ALK PHOS U/L 109  --  108   ALT (SGPT) U/L 9  --  8   AST (SGOT) U/L 23  --  14   GLUCOSE mg/dL 188* 200* 209*         Culture Data:   No results found for: BLOODCX, URINECX, WOUNDCX, MRSACX, RESPCX, STOOLCX    Radiology Data:   Imaging Results (Last 24 Hours)       ** No results found for the last 24 hours. **            I have reviewed the patient's current medications.     Assessment/Plan   Assessment  Active Hospital Problems    Diagnosis     **Hypertensive urgency     Syncope and collapse     Type 2 myocardial infarction due to hypertension     History of coronary artery bypass graft     Hyperlipidemia     Hypokalemia     Hyperglycemia        Treatment Plan  1.  Stroke  -ASA  -plavix   -Statin    2.  Hypertensive Emergency  -resolved    3.  CAD  -ASA  -plavix  -statin    4.  HLD    5.  Carotid Stenosis  -ASA  -Plavix  -Statin  -Vascular Surgery consulted    Medical Decision Making  Number and Complexity of problems: 5 problems, high complexity--stroke represents threat to function    Risk:  Moderate:  Rx drug management    Differential Diagnosis: stroke, carotid stenosis    Conditions and Status        Condition is unchanged.     [facility] Data  1:  Tests/Documents/Independent Historians: n/a  A:  Prior external notes from unique source reviewed:  B.  Review of the Results of Each Unique Test:    C.   Assessment requiring an independent Historian:    2.  Independent interpretation of Tests:  A.  Radiology Interpretation:n/a  B:  EKG/Telemetry Interpretation: (1) NSR    3:  Discussion of management or Test interpretation: n/a      Care Planning  Shared decision making: patient/family agreeable to treatment plan  Code status and discussions: full code    Disposition  Social Determinants of Health that impact treatment or disposition: n/a  I expect the patient to be discharged to rehab in 2-3 days    Electronically signed by Tejinder Plasencia MD, 07/14/23, 15:58 CDT.

## 2023-07-14 NOTE — PLAN OF CARE
Problem: Adult Inpatient Plan of Care  Goal: Plan of Care Review  Outcome: Ongoing, Progressing  Flowsheets (Taken 7/14/2023 7167)  Progress: improving  Plan of Care Reviewed With:   patient   daughter   spouse  Outcome Evaluation: SLP dysphagia tx and cognitive evaluation completed. Pt was upright in bed at time of arrival with breakfast tray positioned appropriately for pt's reach. Pt refused further [facility] soft trials for SLP observation of toleration stating the food didn't taste very good to her because she didn't feel well. She did, however, consume sips of thin liquid water via straw, which she accepted and appeared to tolerate without concern or overt s/s of aspiration. She was perseverative on need to urinate, as well as pain at site of RUE IV site, notified RNGabi, who verbalized awareness. Daughter stated perserveration on urge to urinate has been frequent throughout stay. RN reported four attempts at bed pan use this AM without success prior to SLP arrival. Pt was agreeable to cognitive eval. She was disoriented to month and year, warranting semantic cueing which then allowed her to appropriately respond. She exhibited difficulty with thought organization for completing abstract divergent naming tasks, as she was only able to name three in the attempted category, which was also impacted by perseverations. She also exhibited pragmatic impairment with impulsivity to respond prior to SLP completing questioning on multiple occasions. She had difficulty articulating reasoning behind stated absurdities, required mod cueing. Immediate recall of up to five digit number sequences and up to four unrelated word strands was WNL, yet daughter verbalized concern with memory decline noted with ADLs prior to acute admission, which has daughter concerned that pt may have even experienced TIAs prior to acute CVA. Pt required some cues to provide multiple solutions to hypothetical problem solving tasks, including cues  "to correctly identify that \"911\" instead of calling the , should be utilized in an emergency. She also exhibited difficulty with simple, yet multiple factoral problem solving involving temporal relations. Daughter verbalized concern that pt would not be safe to d/c home, requested to speak with , Lexus Blanco was notified. Feel pt would likely benefit from inpatient rehab at time of acute care discharge. Continue current diet of mechanical soft diet consistency, as dentures remain unavailable at this time, with regular/thin liquid consistency. ST will continue to monitor PO diet toleration and will begin to tx cognitive impairment. Thanks!  "

## 2023-07-14 NOTE — THERAPY EVALUATION
Acute Care - Speech Language Pathology Initial Evaluation and Treatment Note  Psychiatric     Patient Name: Sherlyn Gutierrez  : 1945  MRN: [id number]  Today's Date: 2023               Admit Date: 2023     SLP dysphagia tx and cognitive evaluation completed. Pt was upright in bed at time of arrival with breakfast tray positioned appropriately for pt's reach. Pt refused further [facility] soft trials for SLP observation of toleration stating the food didn't taste very good to her because she didn't feel well. She did, however, consume sips of thin liquid water via straw, which she accepted and appeared to tolerate without concern or overt s/s of aspiration. She was perseverative on need to urinate, as well as pain at site of RUE IV site, notified RNGabi, who verbalized awareness. Daughter stated perserveration on urge to urinate has been frequent throughout stay. RN reported four attempts at bed pan use this AM without success prior to SLP arrival. Pt was agreeable to cognitive eval. She was disoriented to month and year, warranting semantic cueing which then allowed her to appropriately respond. She exhibited difficulty with thought organization for completing abstract divergent naming tasks, as she was only able to name three in the attempted category, which was also impacted by perseverations. She also exhibited pragmatic impairment with impulsivity to respond prior to SLP completing questioning on multiple occasions. She had difficulty articulating reasoning behind stated absurdities, required mod cueing. Immediate recall of up to five digit number sequences and up to four unrelated word strands was WNL, yet daughter verbalized concern with memory decline noted with ADLs prior to acute admission, which has daughter concerned that pt may have even experienced TIAs prior to acute CVA. Pt required some cues to provide multiple solutions to hypothetical problem solving tasks, including cues to correctly  "identify that \"911\" instead of calling the , should be utilized in an emergency. She also exhibited difficulty with simple, yet multiple factoral problem solving involving temporal relations. Daughter verbalized concern that pt would not be safe to d/c home, requested to speak with , Lexus Blanco was notified. Feel pt would likely benefit from inpatient rehab at time of acute care discharge. Continue current diet of mechanical soft diet consistency, as dentures remain unavailable at this time, with regular/thin liquid consistency. ST will continue to monitor PO diet toleration and will begin to tx cognitive impairment. Thanks! Thu Mitchell, CCC-SLP 7/14/2023 13:25 CDT    Visit Dx:    ICD-10-CM ICD-9-CM   1. Syncope and collapse  R55 780.2   2. Dysphagia, unspecified type  R13.10 787.20   3. Decreased independence with activities of daily living [Z78.9 (ICD-10-CM)]  Z78.9 V49.89   4. Impaired mobility [Z74.09 (ICD-10-CM)]  Z74.09 799.89   5. Cognitive and behavioral changes  R41.89 799.59    R46.89 312.9     Patient Active Problem List   Diagnosis    Syncope and collapse    Hypertensive urgency    Type 2 myocardial infarction due to hypertension    History of coronary artery bypass graft    Hyperlipidemia    Hypokalemia    Hyperglycemia     Past Medical History:   Diagnosis Date    Hyperlipidemia     Hypertension      Past Surgical History:   Procedure Laterality Date    LA RPR ANOM CORONARY ARTERY PULM ART ORIGIN GRAFT         SLP Recommendation and Plan  SLP Diagnosis: moderate, cognitive-linguistic disorder (07/14/23 0955)           SLC Criteria for Skilled Therapy Interventions Met: yes (07/14/23 0955)  Anticipated Discharge Disposition (SLP): inpatient rehabilitation facility (07/14/23 0955)        Predicted Duration Therapy Intervention (Days): until discharge (07/14/23 0955)           Patient/Family Concerns, Anticipated Discharge Disposition (SLP): Concerns that pt is not safe to d/c " "home, , Lexus, notified of family concern. (07/14/23 0955)              Plan of Care Reviewed With: patient, daughter, spouse (07/14/23 0955)  Progress: improving (07/14/23 0955)  Outcome Evaluation: SLP dysphagia tx and cognitive evaluation completed. Pt was upright in bed at time of arrival with breakfast tray positioned appropriately for pt's reach. Pt refused further [facility] soft trials for SLP observation of toleration stating the food didn't taste very good to her because she didn't feel well. She did, however, consume sips of thin liquid water via straw, which she accepted and appeared to tolerate without concern or overt s/s of aspiration. She was perseverative on need to urinate, as well as pain at site of RUE IV site, notified RNGabi, who verbalized awareness. Daughter stated perserveration on urge to urinate has been frequent throughout stay. RN reported four attempts at bed pan use this AM without success prior to SLP arrival. Pt was agreeable to cognitive eval. She was disoriented to month and year, warranting semantic cueing which then allowed her to appropriately respond. She exhibited difficulty with thought organization for completing abstract divergent naming tasks, as she was only able to name three in the attempted category, which was also impacted by perseverations. She had difficulty articulating reasoning behind stated absurdities, required mod cueing. Immediate recall of up to five digit number sequences and up to four unrelated word strands was WNL, yet daughter verbalized concern with memory decline noted with ADLs prior to acute admission, which has daughter concerned that pt may have even experienced TIAs prior to acute CVA. Pt required some cues to provide multiple solutions to hypothetical problem solving tasks, including cues to correctly identify that \"911\" instead of calling the , should be utilized in an emergency. She also exhibited difficulty with simple, " yet multiple factoral problem solving involving temporal relations. Daughter verbalized concern that pt would not be safe to d/c home, requested to speak with , Lexus Blanco was notified. Feel pt would likely benefit from inpatient rehab at time of acute care discharge. Continue current diet of mechanical soft diet consistency, as dentures remain unavailable at this time, with regular/thin liquid consistency. ST will continue to monitor PO diet toleration and will begin to tx cognitive impairment. Thanks! (07/14/23 0955)      SLP EVALUATION (last 72 hours)       SLP SLC Evaluation       Row Name 07/14/23 0955                   Communication Assessment/Intervention    Document Type evaluation  -TM        Subjective Information complains of;pain  Site of RLE IV site, RN aware  -TM        Patient Observations alert;cooperative  Distracted  -TM        Patient/Family/Caregiver Comments/Observations Daughter and spouse present during eval.  -TM        Patient Effort adequate  -TM           General Information    Patient Profile Reviewed yes  -TM        Pertinent History Of Current Problem Acute R frontal CVA. Hx of CABG, HLD.  -TM        Precautions/Limitations, Vision WFL  -TM        Precautions/Limitations, Hearing WFL  -TM        Patient Level of Education Unknown to SLP  -TM        Prior Level of Function-Communication cognitive-linguistic impairment;other (see comments)  Recent memory deficits reported per daughter  -TM        Plans/Goals Discussed with patient;spouse/S.O.;family  -TM        Barriers to Rehab cognitive status  Reduced attention  -TM        Patient's Goals for Discharge patient did not state  -TM        Family Goals for Discharge other (see comments)  Safe placement given current cognitive concerns.  -TM           Pain    Additional Documentation Pain Scale: FACES Pre/Post-Treatment (Group)  -TM           Pain Scale: FACES Pre/Post-Treatment    Pain: FACES Scale, Pretreatment 4-->hurts  little more  -TM        Posttreatment Pain Rating 4-->hurts little more  -TM        Pain Location - Side/Orientation Right  -TM        Pain Location upper  -TM        Pain Location - extremity;other (see comments)  At IV site  -TM        Pre/Posttreatment Pain Comment RN aware  -TM           Comprehension Assessment/Intervention    Comprehension Assessment/Intervention Auditory Comprehension  -TM           Auditory Comprehension Assessment/Intervention    Auditory Comprehension (Communication) WFL  -TM           Expression Assessment/Intervention    Expression Assessment/Intervention verbal expression  -TM           Verbal Expression Assessment/Intervention    Verbal Expression WFL  -TM           Oral Motor Structure and Function    Dentition Assessment edentulous, dentures not available  -TM        Mucosal Quality moist, healthy  -TM           Oral Musculature and Cranial Nerve Assessment    Oral Motor General Assessment lingual impairment  -TM        Lingual Impairment, Detail. Cranial Nerves IX, XII (Glossopharyngeal and Hypoglossal) CN12: Motor Impairment;reduced strength left  -TM           Motor Speech Assessment/Intervention    Motor Speech Function WNL  -TM           Cognitive Assessment Intervention- SLP    Cognitive Function (Cognition) moderate impairment  -TM        Orientation Status (Cognition) person;place;situation;WFL;time;moderate impairment;severe impairment  -TM        Memory (Cognitive) simple;mod-complex;immediate;WFL  -TM        Attention (Cognitive) sustained;moderate impairment;quiet environment  -TM        Thought Organization (Cognitive) concrete divergent;WFL;abstract divergent;severe impairment  -TM        Reasoning (Cognitive) absurdities;logic/creative thinking;moderate impairment;severe impairment  -TM        Problem Solving (Cognitive) simple;multifactorial;mild impairment;moderate impairment  -TM        Functional Math (Cognitive) word problems;simple;mild impairment  -TM         Executive Function (Cognition) deficit awareness;self-monitoring/correction;severe impairment;initiation;moderate impairment  -        Pragmatics (Communication) turn taking;initiation;mild impairment  -TM        Right Hemisphere Function pragmatics;mild impairment  -TM           SLP Evaluation Clinical Impressions    SLP Diagnosis moderate;cognitive-linguistic disorder  -TM        Rehab Potential/Prognosis fair;good  -[facility] Criteria for Skilled Therapy Interventions Met yes  -TM        Functional Impact functional impact in social situations;functional impact in ADLs;difficulty in expressing complex messages;difficulty communicating in an emergency;decreased ability to respond to situations safely;difficulty completing home management task  -TM           Recommendations    Therapy Frequency (SLP SLC) at least;2 days per week  -TM        Predicted Duration Therapy Intervention (Days) until discharge  -TM        Anticipated Discharge Disposition (SLP) inpatient rehabilitation facility  -TM        Patient/Family Concerns, Anticipated Discharge Disposition (SLP) Concerns that pt is not safe to d/c home, Lexus, notified of family concern.  -           Communication Treatment Objective and Progress Goals (SLP)    [facility] LTGs Patient will demonstrate functional cognitive-linguistic skills for return to discharge environment  -        Cognitive Linguistic Treatment Objectives Cognitive Linguistic Treatment Objectives (Group)  -           Patient will demonstrate functional cognitive-linguistic skills for return to discharge environment    [place] with minimal cues  -TM        Time frame by discharge  -        Barriers cognitive status  -TM        Progress/Outcomes new goal;goal ongoing  -TM           Cognitive Linguistic Treatment Objectives    Orientation Selection orientation, SLP goal 1  -TM        Organizational Skills Selection organizational skills, SLP goal 1  -TM        Reasoning  "Selection reasoning, SLP goal 1  -TM        Problem Solving Selection problem solving, SLP goal 1  -TM        Functional Math Skills Selection functional math skills, SLP goal 1  -TM           Orientation Goal 1 (SLP)    Improve Orientation Through Goal 1 (SLP) demonstrating orientation to month;demonstrating orientation to year;90%  -TM        Time Frame (Orientation Goal 1, SLP) by discharge  -TM        Barriers (Orientation Goal 1, SLP) Cognition  -TM        Progress/Outcomes (Orientation Goal 1, SLP) new goal;goal ongoing  -TM           Organizational Skills Goal 1 (SLP)    Improve Thought Organization Through Goal 1 (SLP) completing a divergent naming task;abstract;90%  -TM        Time Frame (Thought Organization Skills Goal 1, SLP) by discharge  -TM        Barriers (Thought Organization Skills Goal 1, SLP) Cognition  -TM        Progress/Outcomes (Thought Organization Skills Goal 1, SLP) new goal;goal ongoing  -TM           Reasoning Goal 1 (SLP)    Improve Reasoning Through Goal 1 (SLP) identify absurdities;90%  -TM        Time Frame (Reasoning Goal 1, SLP) by discharge  -TM        Barriers (Reasoning Goal 1, SLP) Cognition  -TM        Progress/Outcomes (Reasoning Goal 1, SLP) new goal;goal ongoing  -TM           Functional Problem Solving Skills Goal 1 (SLP)    Improve Problem Solving Through Goal 1 (SLP) determine solutions to complex problems;determine solutions to multifactorial problems;answer \"what if\" questions;90%  -TM        Time Frame (Problem Solving Goal 1, SLP) by discharge  -TM        Barriers (Problem Solving Goal 1, SLP) Cognition  -TM        Progress/Outcomes (Problem Solving Goal 1, SLP) new goal;goal ongoing  -TM           Functional Math Skills Goal 1 (SLP)    Improve Functional Math Skills Through Goal 1 (SLP) complete word problems involving time;complete word problems involving money  -TM        Time Frame (Functional Math Skills Goal 1, SLP) by discharge  -TM        Barriers (Functional " Math Skills Goal 1, SLP) Cognition  -TM        Progress/Outcomes (Functional Math Skills Goal 1, SLP) new goal;goal ongoing  -TM                  User Key  (r) = Recorded By, (t) = Taken By, (c) = Cosigned By      Initials Name Effective Dates    TM Thu Mitchell CCC-SLP 02/03/23 -                        EDUCATION  The patient has been educated in the following areas:     Cognitive Impairment Communication Impairment Dysphagia (Swallowing Impairment).           SLP GOALS       Row Name 07/14/23 0955 07/12/23 0822          (LTG) Swallow    (LTG) Swallow -- Pt will tolerate least restrictive diet with no overt s/s of aspiration.  -MB     [place] (Swallow Long Term Goal) -- independently (over 90% accuracy)  -MB     Time Frame (Swallow Long Term Goal) -- by discharge  -MB     Barriers (Swallow Long Term Goal) -- n/a  -MB     Progress/Outcomes (Swallow Long Term Goal) -- new goal  -MB     Comment (Swallow Long Term Goal) -- n/a  -MB        (STG) Patient will tolerate trials of    Consistencies Trialed (Tolerate trials) -- regular textures;soft to chew (ground) textures;thin liquids  -MB     Desired Outcome (Tolerate trials) -- without signs/symptoms of aspiration;with adequate oral prep/transit/clearance  -MB     [place] (Tolerate trials) -- independently (over 90% accuracy)  -MB     Time Frame (Tolerate trials) -- by discharge  -MB     Progress/Outcomes (Tolerate trials) -- new goal  -MB     Comment (Tolerate trials) -- n/a  -MB        Patient will demonstrate functional cognitive-linguistic skills for return to discharge environment    [place] with minimal cues  -TM --     Time frame by discharge  -TM --     Barriers cognitive status  -TM --     Progress/Outcomes new goal;goal ongoing  -TM --        Orientation Goal 1 (SLP)    Improve Orientation Through Goal 1 (SLP) demonstrating orientation to month;demonstrating orientation to year;90%  -TM --     Time Frame (Orientation Goal 1, SLP) by discharge   "-TM --     Barriers (Orientation Goal 1, SLP) Cognition  -TM --     Progress/Outcomes (Orientation Goal 1, SLP) new goal;goal ongoing  -TM --        Organizational Skills Goal 1 (SLP)    Improve Thought Organization Through Goal 1 (SLP) completing a divergent naming task;abstract;90%  -TM --     Time Frame (Thought Organization Skills Goal 1, SLP) by discharge  -TM --     Barriers (Thought Organization Skills Goal 1, SLP) Cognition  -TM --     Progress/Outcomes (Thought Organization Skills Goal 1, SLP) new goal;goal ongoing  -TM --        Reasoning Goal 1 (SLP)    Improve Reasoning Through Goal 1 (SLP) identify absurdities;90%  -TM --     Time Frame (Reasoning Goal 1, SLP) by discharge  -TM --     Barriers (Reasoning Goal 1, SLP) Cognition  -TM --     Progress/Outcomes (Reasoning Goal 1, SLP) new goal;goal ongoing  -TM --        Functional Problem Solving Skills Goal 1 (SLP)    Improve Problem Solving Through Goal 1 (SLP) determine solutions to complex problems;determine solutions to multifactorial problems;answer \"what if\" questions;90%  -TM --     Time Frame (Problem Solving Goal 1, SLP) by discharge  -TM --     Barriers (Problem Solving Goal 1, SLP) Cognition  -TM --     Progress/Outcomes (Problem Solving Goal 1, SLP) new goal;goal ongoing  -TM --        Functional Math Skills Goal 1 (SLP)    Improve Functional Math Skills Through Goal 1 (SLP) complete word problems involving time;complete word problems involving money  -TM --     Time Frame (Functional Math Skills Goal 1, SLP) by discharge  -TM --     Barriers (Functional Math Skills Goal 1, SLP) Cognition  -TM --     Progress/Outcomes (Functional Math Skills Goal 1, SLP) new goal;goal ongoing  -TM --               User Key  (r) = Recorded By, (t) = Taken By, (c) = Cosigned By      Initials Name Provider Type    Alysha Van, CCC-SLP Speech and Language Pathologist    Thu Godwin, CCC-SLP Speech and Language Pathologist              "               Time Calculation:      Time Calculation- SLP       Row Name 07/14/23 1323             Time Calculation- SLP    SLP Start Time 0955  -TM      SLP Stop Time 1125  -TM      SLP Time Calculation (min) 90 min  -TM      SLP Received On 07/14/23  -TM         Untimed Charges    [id number] Eval Speech and Production w/ Language Minutes 75  -TM      [id number] Treatment Swallow Minutes 15  -TM         Total Minutes    Untimed Charges Total Minutes 90  -TM       Total Minutes 90  -TM                User Key  (r) = Recorded By, (t) = Taken By, (c) = Cosigned By      Initials Name Provider Type     Thu Mitchell CCC-SLP Speech and Language Pathologist                    Therapy Charges for Today       Code Description Service Date Service Provider Modifiers Qty    72882228131 HC ST EVAL SPEECH AND PROD W LANG  5 7/14/2023 Thu Mitchell CCC-SLP GN 1    [id number] HC ST TREATMENT SWALLOW 1 7/14/2023 Thu Mitchell CCC-SLP GN 1                       JOSHUA Garduno  7/14/2023

## 2023-07-14 NOTE — PROGRESS NOTES
Neurology Progress Note      Patient: Sherlyn Gutierrez (78 y.o. female)  MRN: [id number]  : 1945    Subjective:   NAOE. Patient moved to the floor.  bedside. Cognition continues to improve. Pending IPR.  states he is scheduled to have surgery in [place] in August and there is no family avaiable to help them out.     Current Medications:    Current Facility-Administered Medications:     acetaminophen (TYLENOL) tablet 650 mg, 650 mg, Oral, Q4H PRN, 650 mg at 23 0806 **OR** acetaminophen (TYLENOL) 160 MG/5ML solution 650 mg, 650 mg, Oral, Q4H PRN **OR** [DISCONTINUED] acetaminophen (TYLENOL) suppository 650 mg, 650 mg, Rectal, Q4H PRN, Igor Luevano DO    aluminum-magnesium hydroxide-simethicone (MAALOX MAX) 400-400-40 MG/5ML suspension 7.5 mL, 7.5 mL, Oral, Q4H PRN, Tay Ernandez MD    aspirin EC tablet 81 mg, 81 mg, Oral, Daily, Igor Luevano DO, 81 mg at 23 08    atorvastatin (LIPITOR) tablet 80 mg, 80 mg, Oral, Nightly, Tay Ernandez MD, 80 mg at 234    bisacodyl (DULCOLAX) suppository 10 mg, 10 mg, Rectal, Daily PRN, Tay Ernandez MD    [COMPLETED] clopidogrel (PLAVIX) tablet 300 mg, 300 mg, Oral, Once, 300 mg at 23 1014 **AND** clopidogrel (PLAVIX) tablet 75 mg, 75 mg, Oral, Daily, Tay Ernandez MD, 75 mg at 23 08    lisinopril (PRINIVIL,ZESTRIL) tablet 20 mg, 20 mg, Oral, Q24H, Igor Luevano DO, 20 mg at 23 0806    melatonin tablet 5 mg, 5 mg, Oral, Nightly PRN, Igor Luevano DO, 5 mg at 23 224    ondansetron (ZOFRAN) injection 4 mg, 4 mg, Intravenous, Q6H PRN, Igor Luevano, DO    sodium chloride 0.9 % flush 10 mL, 10 mL, Intravenous, PRN, Jose Luis Vang, DO    sodium chloride 0.9 % flush 10 mL, 10 mL, Intravenous, PRN, Igor Luevano, DO    sodium chloride 0.9 % flush 10 mL, 10 mL, Intravenous, Q12H, Tay Ernandez MD, 10 mL at 23 0809    sodium chloride 0.9 % flush 10 mL, 10  mL, Intravenous, Awais LOZANO Amartyadeb, MD    sodium chloride 0.9 % infusion 40 mL, 40 mL, Intravenous, PRN, Igor Luevano DO    sodium chloride 0.9 % infusion 40 mL, 40 mL, Intravenous, Awais LOZANO Amartyadeb, MD     Physical Examination:   Vital Signs:  Vitals:    07/13/23 1949 07/14/23 0731 07/14/23 1142 07/14/23 1536   BP: (!) 188/81 136/66 149/61 174/73   BP Location: Left arm Right arm Right arm Right arm   Patient Position: Lying Lying Lying Lying   Pulse: 100 77 72 83   Resp: 20 16 16 16   Temp: 98.9 °F (37.2 °C) 98 °F (36.7 °C) 98.2 °F (36.8 °C) 98.1 °F (36.7 °C)   TempSrc: Oral Oral Oral Oral   SpO2: 99% 90% 97% 97%   Weight:       Height:           General Examination:  Constitutional:  Well-developed/Well norished.  Eyes:   No scleral icterus.  HENT:   Normocephalic/atraumatic.   Neck/Back:  Supple w/ full range of motion.  Cardiovascular:  Tachycardic, regular rhythm.   Respiratory:   No stridor, no respiratory distress.  Gastrointestinal:  Soft. No distension or tenderness.  Musculoskeletal:  No edema or tenderness.    Skin:    Warm and dry. No rash noted.   Psychiatric:   Insight inadequate, mood calm.     Neurological Examination:  Mental status:   A/OX2. Still unable to state age or date.   Follows simple 1-step commands.  Language/speech: Fluent w/o dysarthria. Comprehension intact.   Cranial nerves:  VFs full to confrontation.      Gaze conjugate w/o deviation. EOMI.     V1-3 equal to light touch.      Facial symmetry.     Hearing equal bilaterally to finger rub.     Symmetric palate rise .     Tongue midline.  Motor system:  Muscle tone and appearance are normal. No contractures.     RUE: Antigravity w/o drift.     LUE: Antigravity w/ drift.     RLE: Antigravity w/o drift.     LLE: No effort against gravity.  Sensory:   Withdraws to pain in all extremities. L sided neglect.  Coordination:   No tremor or abnormal movements.  Gait:    Unsafe to ambulate  Recent Diagnostics:   Laboratory  Results:   - Reviewed in EMR  Lab Results   Component Value Date    GLUCOSE 188 (H) 07/13/2023    CALCIUM 9.2 07/13/2023     07/13/2023    K 3.8 07/13/2023    CO2 22.0 07/13/2023     07/13/2023    BUN 11 07/13/2023    CREATININE 0.84 07/13/2023    BCR 13.1 07/13/2023    ANIONGAP 13.0 07/13/2023     Lab Results   Component Value Date    WBC 11.65 (H) 07/13/2023    HGB 12.7 07/13/2023    HCT 39.7 07/13/2023    MCV 93.0 07/13/2023     07/13/2023     Lab Results   Component Value Date    PTT 27.6 07/13/2023    INR 0.98 07/13/2023     Lab Results   Component Value Date    TRIG 136 07/13/2023    HDL 40 07/13/2023     (H) 07/13/2023     Lab Results   Component Value Date    HGBA1C 7.00 (H) 07/12/2023       Imaging Results:  Imaging reviewed personally in EMR/RAPID AI.    CT BRAIN - no acute intracranial abnormalities.  CTA HEAD/NECK- Densely calcified plaque of the proximal internal carotid arteries w/ 70% stenosis of the proximal right ICA and 60% stenosis proximal left ICA.  MRI BRAIN - R frontal ischemia, CSVD  TTE -  Results for orders placed during the hospital encounter of 07/12/23    Adult Transthoracic Echo Complete W/ Cont if Necessary Per Protocol    Interpretation Summary    Left ventricular systolic function is normal. Left ventricular ejection fraction appears to be 56 - 60%.    Left ventricular wall thickness is consistent with moderate to severe septal asymmetric hypertrophy.    The findings are consistent with nonobstructive hypertrophic cardiomyopathy.    Left ventricular diastolic function is consistent with (grade I) impaired relaxation.    Estimated right ventricular systolic pressure from tricuspid regurgitation is normal (<35 mmHg).    Saline test is technically difficult but grossly normal    Assessment & Plan:   Patient is a 78 y.o. woman with a PMH significant for HTN, HLD, and CAD s/p CABG who presented with AMS and was found to have b/l carotid stenosis an acute RMCA  territory ischemic stroke. On arrival patient was severely hypertensive, was stared on a cardine gtt and transfer to the ICU. Patient blood pressure is now improved.    Neurologic Problems:  1) R MCA territory ischemic stroke  2) L sided weakness and neglect  3) B/L carotid stenosis (R>L)  4) Encephalopathy in setting of CVA    Plan:   - Continue current BP medications as prescribed with a systolic BP goal of <130.  - Continue taking atorvastatin 80mg for cholesterol reduction with a LDL goal of < 70.  - HbA1c 7.0 Patient will need to be started on DM medication  - Continue taking ASA 81mg and Plavix for b/l carotid stenosis.  - Patient to follow up w/ vascular surgery after IPR  - Social work consult, patient and  will likely need inhome assistance or temporary stay at assisted living.  - PT/OT    Thank you for this consult, please let me know if you have any further questions.     Jazmin Abraham MD  07/14/23  16:19 CDT

## 2023-07-14 NOTE — CASE MANAGEMENT/SOCIAL WORK
Continued Stay Note   [place]     Patient Name: Sherlyn Gutierrez  MRN: [id number]  Today's Date: 7/14/2023    Admit Date: 7/12/2023    Plan: Referral to [facility]   Discharge Plan       Row Name 07/14/23 0940       Plan    Plan Referral to [facility]    Plan Comments Informed Julio with [facility] that patient has moved to 3A. Will await decision.                 RUSS Crockett

## 2023-07-14 NOTE — THERAPY TREATMENT NOTE
Patient Name: Sherlyn Gutierrez  : 1945    MRN: [id number]                              Today's Date: 2023       Admit Date: 2023    Visit Dx:     ICD-10-CM ICD-9-CM   1. Syncope and collapse  R55 780.2   2. Dysphagia, unspecified type  R13.10 787.20   3. Decreased independence with activities of daily living [Z78.9 (ICD-10-CM)]  Z78.9 V49.89   4. Impaired mobility [Z74.09 (ICD-10-CM)]  Z74.09 799.89   5. Cognitive and behavioral changes  R41.89 799.59    R46.89 312.9     Patient Active Problem List   Diagnosis    Syncope and collapse    Hypertensive urgency    Type 2 myocardial infarction due to hypertension    History of coronary artery bypass graft    Hyperlipidemia    Hypokalemia    Hyperglycemia     Past Medical History:   Diagnosis Date    Hyperlipidemia     Hypertension      Past Surgical History:   Procedure Laterality Date    [place] RPR ANOM CORONARY ARTERY PULM ART ORIGIN GRAFT        General Information       Row Name 23 0745          OT Time and Intention    Document Type therapy note (daily note)  -CS (r) RH (t) CS (c)     Mode of Treatment occupational therapy  -CS (r) RH (t) CS (c)       Row Name 23 0745          General Information    Patient Profile Reviewed yes  -CS (r) RH (t) CS (c)     Existing Precautions/Restrictions fall  -CS (r) RH (t) CS (c)       Row Name 23 0745          Cognition    Orientation Status (Cognition) oriented to;person;place;disoriented to;situation;time  -CS (r) RH (t) CS (c)               User Key  (r) = Recorded By, (t) = Taken By, (c) = Cosigned By      Initials Name Provider Type    CS Patti Yanez S, OTR/L, CNT Occupational Therapist    RH Carmen Hernandez, OT Student OT Student                     Mobility/ADL's       Row Name 23 0745          Bed Mobility    Bed Mobility rolling left;rolling right;scooting/bridging  -CS (r) RH (t) CS (c)     Rolling Left [place] (Bed Mobility) verbal cues;minimum assist (75% patient effort)  -CS  (r) RH (t) CS (c)     Rolling Right [place] (Bed Mobility) maximum assist (25% patient effort);verbal cues  -CS (r) RH (t) CS (c)     Scooting/Bridging [place] (Bed Mobility) maximum assist (25% patient effort);2 person assist;verbal cues  -CS (r) RH (t) CS (c)     Assistive Device (Bed Mobility) draw sheet  -CS (r) RH (t) CS (c)       Row Name 07/14/23 07          Activities of Daily Living    BADL Assessment/Intervention grooming;toileting  -CS (r) RH (t) CS (c)       Row Name 07/14/23 07          Grooming Assessment/Training    [place] Level (Grooming) oral care regimen;verbal cues;minimum assist (75% patient effort);other (see comments)  [place]  -CS (r) RH (t) CS (c)       Row Name 07/14/23 07          Toileting Assessment/Training    [place] Level (Toileting) perform perineal hygiene;change pad/brief;dependent (less than 25% patient effort)  -CS (r) RH (t) CS (c)     Assistive Devices (Toileting) bedpan  -CS (r) RH (t) CS (c)     Position (Toileting) supine  -CS (r) RH (t) CS (c)               User Key  (r) = Recorded By, (t) = Taken By, (c) = Cosigned By      Initials Name Provider Type    Patti Dawson, OTR/L, ESMER Occupational Therapist    Carmen Garcia, OT Student OT Student                   Obj/Interventions       Row Name 07/14/23 07          Motor Skills    Motor Skills motor control/coordination interventions  -CS (r) RH (t) CS (c)     Motor Control/Coordination Interventions fine motor manipulation/dexterity activities  -CS (r) RH (t) CS (c)               User Key  (r) = Recorded By, (t) = Taken By, (c) = Cosigned By      Initials Name Provider Type    Patti Dawson, OTR/L, ESMER Occupational Therapist    Carmen Garcia, OT Student OT Student                   Goals/Plan    No documentation.                  Clinical Impression       Row Name 07/14/23 0745          Pain Assessment    Pretreatment Pain Rating 0/10 - no pain  -CS (r) RH (t) CS (c)      Posttreatment Pain Rating 0/10 - no pain  -CS (r) RH (t) CS (c)     Pre/Posttreatment Pain Comment Pt reports no pain when asked, but continuously c/o pain in R arm at site of IV thoughout tx session.  -CS (r) RH (t) CS (c)     Pain Intervention(s) Repositioned;Ambulation/increased activity;Medication (See MAR)  -CS (r) RH (t) CS (c)       Row Name 07/14/23 0745          Plan of Care Review    Plan of Care Reviewed With patient  -CS (r) RH (t) CS (c)     Progress improving  -CS (r) RH (t) CS (c)     Outcome Evaluation OT tx completed. Pt A&O to person and place, disoriented to time and situation. Pt laying supine on bedpan upon therapist arrival. Pt performed rolling L x2 with VC for hand placement and LE use and [place]; rolling L x2 with maxA and VC. Pt dependent with perineal hygiene and changing brief supine in bed. Pt required maxA x2 for scooting up in bed with use of draw sheet. Pt completed oral hygiene sitting up in bed with VC, Zamz[place], and [place] of LUE. Pt completed fine motor/dexterity activities sitting up in bed using LUE with VC and [place] for 2/6 items. Pt reported need for bedpan to urinate 4x throughout the session. Pt demonstrated continued short term memory deficits, pt repeating phrases and questions every 2-3 minutes. OT to continue POC.  -CS (r) RH (t) CS (c)       Row Name 07/14/23 0745          Therapy Plan Review/Discharge Plan (OT)    Anticipated Discharge Disposition (OT) inpatient rehabilitation facility  -CS (r) RH (t) CS (c)       Row Name 07/14/23 0745          Vital Signs    O2 Delivery Pre Treatment room air  -CS (r) RH (t) CS (c)     O2 Delivery Intra Treatment room air  -CS (r) RH (t) CS (c)     O2 Delivery Post Treatment room air  -CS (r) RH (t) CS (c)     Pre Patient Position Supine  -CS (r) RH (t) CS (c)     Intra Patient Position Side Lying  -CS (r) RH (t) CS (c)     Post Patient Position Supine  -CS (r) RH (t) CS (c)       Row Name 07/14/23 0746          Positioning and Restraints     Pre-Treatment Position in bed  -CS (r) RH (t) CS (c)     Post Treatment Position bed  -CS (r) RH (t) CS (c)     In Bed notified nsg;fowlers;call light within reach;encouraged to call for assist;exit alarm on;side rails up x2  -CS (r) RH (t) CS (c)               User Key  (r) = Recorded By, (t) = Taken By, (c) = Cosigned By      Initials Name Provider Type    CS Patti Yanez, OTR/L, CNT Occupational Therapist    Carmen Garcia, OT Student OT Student                   Outcome Measures       Row Name 07/14/23 0745          How much help from another is currently needed...    Putting on and taking off regular lower body clothing? 1  -CS (r) RH (t) CS (c)     Bathing (including washing, rinsing, and drying) 2  -CS (r) RH (t) CS (c)     Toileting (which includes using toilet bed pan or urinal) 2  -CS (r) RH (t) CS (c)     Putting on and taking off regular upper body clothing 2  -CS (r) RH (t) CS (c)     Taking care of personal grooming (such as brushing teeth) 3  -CS (r) RH (t) CS (c)     Eating meals 3  -CS (r) RH (t) CS (c)     AM-PAC 6 Clicks Score (OT) 13  -CS (r) RH (t)       Row Name 07/14/23 0806          How much help from another person do you currently need...    Turning from your back to your side while in flat bed without using bedrails? 3  -CP     Moving from lying on back to sitting on the side of a flat bed without bedrails? 3  -CP     Moving to and from a bed to a chair (including a wheelchair)? 1  -CP     Standing up from a chair using your arms (e.g., wheelchair, bedside chair)? 1  -CP     Climbing 3-5 steps with a railing? 1  -CP     To walk in hospital room? 1  -CP     AM-PAC 6 Clicks Score (PT) 10  -CP     Highest level of mobility 4 --> Transferred to chair/commode  -CP       Row Name 07/14/23 0373          Modified [place] Scale    Pre-Stroke Modified [place] Scale 0 - No Symptoms at all.  -CS (r) RH (t) CS (c)     Modified [place] Scale 5 - Severe disability.  Bedridden, incontinent, and  requiring constant nursing care and attention.  -CS (r) RH (t) CS (c)       Row Name 07/14/23 0745          Functional Assessment    Outcome Measure Options AM-PAC 6 Clicks Daily Activity (OT)  -CS (r) RH (t) CS (c)               User Key  (r) = Recorded By, (t) = Taken By, (c) = Cosigned By      Initials Name Provider Type    CS Patti Yanez, OTR/L, CNT Occupational Therapist    Batsheva Burciaga, RN Registered Nurse     Carmen Hernandez, OT Student OT Student                    Occupational Therapy Education       Title: PT OT SLP Therapies (In Progress)       Topic: Occupational Therapy (In Progress)       Point: ADL training (Done)       Description:   Instruct learner(s) on proper safety adaptation and remediation techniques during self care or transfers.   Instruct in proper use of assistive devices.                  Learning Progress Summary             Patient Acceptance, E,TB,D, VU,DU,NR by  at 7/14/2023 0745    Comment: Educated on toiletting skills and bedpan use.     by  at 7/13/2023 0922    Acceptance, E,D,TB, NR by  at 7/13/2023 0830    Acceptance, E, NR by  at 7/12/2023 1116                         Point: Home exercise program (Not Started)       Description:   Instruct learner(s) on appropriate technique for monitoring, assisting and/or progressing therapeutic exercises/activities.                  Learner Progress:  Not documented in this visit.              Point: Precautions (In Progress)       Description:   Instruct learner(s) on prescribed precautions during self-care and functional transfers.                  Learning Progress Summary             Patient Acceptance, E, NR by  at 7/12/2023 1116                         Point: Body mechanics (Not Started)       Description:   Instruct learner(s) on proper positioning and spine alignment during self-care, functional mobility activities and/or exercises.                  Learner Progress:  Not documented in this visit.                               User Key       Initials Effective Dates Name Provider Type Discipline     07/11/23 -  Caren Colón OTR/L, CSRS Occupational Therapist OT     05/03/23 -  Carmen Hernandez, OT Student OT Student OT                  OT Recommendation and Plan     Plan of Care Review  Plan of Care Reviewed With: patient  Progress: improving  Outcome Evaluation: OT tx completed. Pt A&O to person and place, disoriented to time and situation. Pt laying supine on bedpan upon therapist arrival. Pt performed rolling L x2 with VC for hand placement and LE use and [place]; rolling L x2 with maxA and VC. Pt dependent with perineal hygiene and changing brief supine in bed. Pt required maxA x2 for scooting up in bed with use of draw sheet. Pt completed oral hygiene sitting up in bed with VC, Zamzam, and [place] of LUE. Pt completed fine motor/dexterity activities sitting up in bed using LUE with VC and [place] for 2/6 items. Pt reported need for bedpan to urinate 4x throughout the session. Pt demonstrated continued short term memory deficits, pt repeating phrases and questions every 2-3 minutes. OT to continue POC.     Time Calculation:    Time Calculation- OT       Row Name 07/14/23 0745             Time Calculation- OT    OT Start Time 0745  -CS (r) RH (t) CS (c)      OT Stop Time 0826  -CS (r) RH (t) CS (c)      OT Time Calculation (min) 41 min  -CS (r) RH (t)      Total Timed Code Minutes- OT 41 minute(s)  -CS (r) RH (t) CS (c)      OT Received On 07/14/23  -CS (r) RH (t) CS (c)         Timed Charges    16539 - OT Therapeutic Exercise Minutes --  -CS (r) RH (t) CS (c)      90927 - OT Therapeutic Activity Minutes 15  -CS (r) RH (t) CS (c)      49330 - OT Self Care/Mgmt Minutes 26  -CS (r) RH (t) CS (c)         Total Minutes    Timed Charges Total Minutes 41  -CS (r) RH (t)       Total Minutes 41  -CS (r) RH (t)                User Key  (r) = Recorded By, (t) = Taken By, (c) = Cosigned By      Initials Name Provider Type    CS  Patti Yanez S, OTR/L, CNT Occupational Therapist    RH Carmen Hernandez, OT Student OT Student                           Carmen Hernandez, OT Student  7/14/2023

## 2023-07-14 NOTE — PLAN OF CARE
Goal Outcome Evaluation:  Plan of Care Reviewed With: (P) patient        Progress: (P) improving  Outcome Evaluation: (P) OT tx completed. Pt A&O to person and place, disoriented to time and situation. Pt laying supine on bedpan upon therapist arrival. Pt performed rolling L x2 with VC for hand placement and LE use and [place]; rolling L x2 with maxA and VC. Pt dependent with perineal hygiene and changing brief supine in bed. Pt required maxA x2 for scooting up in bed with use of draw sheet. Pt completed oral hygiene sitting up in bed with VC, [place], and [place] of LUE. Pt completed fine motor/dexterity activities sitting up in bed using LUE with VC and [place] for 2/6 items. Pt reported need for bedpan to urinate 4x throughout the session. Pt demonstrated continued short term memory deficits, pt repeating phrases and questions every 2-3 minutes. OT to continue POC.      Anticipated Discharge Disposition (OT): (P) inpatient rehabilitation facility

## 2023-07-14 NOTE — THERAPY TREATMENT NOTE
Acute Care - Physical Therapy Treatment Note  [facility]     Patient Name: Sherlyn Gutierrez  : 1945  MRN: [id number]  Today's Date: 2023      Visit Dx:     ICD-10-CM ICD-9-CM   1. Syncope and collapse  R55 780.2   2. Dysphagia, unspecified type  R13.10 787.20   3. Decreased independence with activities of daily living [Z78.9 (ICD-10-CM)]  Z78.9 V49.89   4. Impaired mobility [Z74.09 (ICD-10-CM)]  Z74.09 799.89   5. Cognitive and behavioral changes  R41.89 799.59    R46.89 312.9     Patient Active Problem List   Diagnosis    Syncope and collapse    Hypertensive urgency    Type 2 myocardial infarction due to hypertension    History of coronary artery bypass graft    Hyperlipidemia    Hypokalemia    Hyperglycemia     Past Medical History:   Diagnosis Date    Hyperlipidemia     Hypertension      Past Surgical History:   Procedure Laterality Date    [place] RPR ANOM CORONARY ARTERY PULM ART ORIGIN GRAFT       PT Assessment (last 12 hours)       PT Evaluation and Treatment       Row Name 23 1457          Physical Therapy Time and Intention    Subjective Information complains of;pain  -KJ     Document Type therapy note (daily note)  -KJ     Mode of Treatment physical therapy  -KJ     Patient Effort good  -KJ     Comment short term memory; kept asking the same questions over and over. Anxious, thinks she needs to call her  to come get her.  -KJ       Row Name 23 1457          General Information    Existing Precautions/Restrictions fall  L side weakness/flaccid LLE  -KJ       Row Name 23 1457          Pain    Pretreatment Pain Rating 0/10 - no pain  -KJ     Posttreatment Pain Rating 0/10 - no pain  -KJ       Row Name 23 1457          Bed Mobility    Rolling Left [place] (Bed Mobility) verbal cues;minimum assist (75% patient effort);moderate assist (50% patient effort)  -KJ     Rolling Right [place] (Bed Mobility) verbal cues;moderate assist (50% patient effort);maximum assist (25%  patient effort)  -KJ     Supine-Sit [place] (Bed Mobility) verbal cues;moderate assist (50% patient effort);maximum assist (25% patient effort)  -KJ     Sit-Supine [place] (Bed Mobility) moderate assist (50% patient effort);verbal cues  -KJ     Bed Mobility, Safety Issues decreased use of arms for pushing/pulling;decreased use of legs for bridging/pushing  -KJ       Row Name 07/14/23 1457          Sit-Stand Transfer    Comment, (Sit-Stand Transfer) attempted x 2, but unable safely  -KJ       Row Name 07/14/23 1457          Balance    Balance Assessment sitting static balance  -KJ     Static Sitting Balance verbal cues;minimal assist;moderate assist  -KJ     Dynamic Sitting Balance verbal cues;contact guard  -KJ     Position, Sitting Balance sitting edge of bed  -KJ       Row Name 07/14/23 1457          Positioning and Restraints    Pre-Treatment Position in bed  -KJ     Post Treatment Position bed  -KJ     In Bed call light within reach  -KJ               User Key  (r) = Recorded By, (t) = Taken By, (c) = Cosigned By      Initials Name Provider Type    Cora Handy, PTA Physical Therapist Assistant                    Physical Therapy Education       Title: PT OT SLP Therapies (In Progress)       Topic: Physical Therapy (In Progress)       Point: Mobility training (In Progress)       Learning Progress Summary             Patient Acceptance, E, NR by OKSANA at 7/13/2023 0854    Comment: bed mobility                         Point: Home exercise program (Not Started)       Learner Progress:  Not documented in this visit.              Point: Body mechanics (Not Started)       Learner Progress:  Not documented in this visit.              Point: Precautions (Done)       Learning Progress Summary             Patient Acceptance, E, VU by STEPHENIE at 7/12/2023 1102    Comment: Educated pt. on not getting out of bed and needing assitance.                                         User Key       Initials Effective Dates  Name Provider Type Discipline     02/03/23 -  Bessy Haq PTA Physical Therapist Assistant PT     05/25/23 -  Laure Ornelas PT Student PT Student PT                  PT Recommendation and Plan         Outcome Measures       Row Name 07/14/23 1500 07/13/23 0800          How much help from another person do you currently need...    Turning from your back to your side while in flat bed without using bedrails? 2  -KJ 3  -OKSANA     Moving from lying on back to sitting on the side of a flat bed without bedrails? 2  -KJ 3  -OKSANA     Moving to and from a bed to a chair (including a wheelchair)? 1  -KJ 1  -OKSANA     Standing up from a chair using your arms (e.g., wheelchair, bedside chair)? 1  -KJ 1  -OKSANA     Climbing 3-5 steps with a railing? 1  -KJ 1  -OKSANA     To walk in hospital room? 1  -KJ 1  -OKSANA     AM-PAC 6 Clicks Score (PT) 8  -KJ 10  -OKSANA        Functional Assessment    Outcome Measure Options AM-PAC 6 Clicks Basic Mobility (PT)  -KJ --               User Key  (r) = Recorded By, (t) = Taken By, (c) = Cosigned By      Initials Name Provider Type    Cora Handy, OSMAN Physical Therapist Assistant     Bessy Haq, OSMAN Physical Therapist Assistant                     Time Calculation:    PT Charges       Row Name 07/14/23 1557             Time Calculation    Start Time 1515  -KJ      Stop Time 1557  -KJ      Time Calculation (min) 42 min  -KJ      PT Received On 07/14/23  -KJ      PT Goal Re-Cert Due Date 07/22/23  -KJ         Time Calculation- PT    Total Timed Code Minutes- PT 42 minute(s)  -KJ                User Key  (r) = Recorded By, (t) = Taken By, (c) = Cosigned By      Initials Name Provider Type    Cora Handy, OSMAN Physical Therapist Assistant                  Therapy Charges for Today       Code Description Service Date Service Provider Modifiers Qty    03903074796 HC PT THER PROC EA 15 MIN 7/14/2023 Cora Arredondo, OSMAN GP 1    03154640169 HC PT THERAPEUTIC ACT EA 15 MIN 7/14/2023 Maria Elena  Cora PARKS, PTA GP 2            PT G-Codes  Outcome Measure Options: AM-PAC 6 Clicks Basic Mobility (PT)  AM-PAC 6 Clicks Score (PT): 8  AM-PAC 6 Clicks Score (OT): 13  Modified [place] Scale: 5 - Severe disability.  Bedridden, incontinent, and requiring constant nursing care and attention.    Cora Arredondo, PTA  7/14/2023

## 2023-07-14 NOTE — PLAN OF CARE
Goal Outcome Evaluation:  Plan of Care Reviewed With: patient        Progress: no change  Outcome Evaluation: Pt disoriented x2. Very forgetful. Bedrest. NIH=7. Weakness to L side noted. Bedpan. Inc at times. Very anxious. Calls out frequently. Bed alarm on. Safety maintained. Will cont to monitor.

## 2023-07-15 LAB
GLUCOSE BLDC GLUCOMTR-MCNC: 127 MG/DL (ref 70–130)
GLUCOSE BLDC GLUCOMTR-MCNC: 132 MG/DL (ref 70–130)
GLUCOSE BLDC GLUCOMTR-MCNC: 137 MG/DL (ref 70–130)
GLUCOSE BLDC GLUCOMTR-MCNC: 162 MG/DL (ref 70–130)

## 2023-07-15 PROCEDURE — 82948 REAGENT STRIP/BLOOD GLUCOSE: CPT

## 2023-07-15 PROCEDURE — 97530 THERAPEUTIC ACTIVITIES: CPT

## 2023-07-15 PROCEDURE — 97110 THERAPEUTIC EXERCISES: CPT

## 2023-07-15 RX ORDER — HYDROXYZINE HYDROCHLORIDE 25 MG/1
25 TABLET, FILM COATED ORAL EVERY 6 HOURS PRN
Status: DISCONTINUED | OUTPATIENT
Start: 2023-07-15 | End: 2023-07-20 | Stop reason: HOSPADM

## 2023-07-15 RX ADMIN — Medication 10 ML: at 08:39

## 2023-07-15 RX ADMIN — ATORVASTATIN CALCIUM 80 MG: 40 TABLET ORAL at 21:19

## 2023-07-15 RX ADMIN — TEMAZEPAM 15 MG: 15 CAPSULE ORAL at 21:19

## 2023-07-15 RX ADMIN — LISINOPRIL 20 MG: 20 TABLET ORAL at 08:39

## 2023-07-15 RX ADMIN — CLOPIDOGREL BISULFATE 75 MG: 75 TABLET, FILM COATED ORAL at 08:39

## 2023-07-15 RX ADMIN — HYDROXYZINE HYDROCHLORIDE 25 MG: 25 TABLET ORAL at 12:49

## 2023-07-15 RX ADMIN — Medication 10 ML: at 21:19

## 2023-07-15 RX ADMIN — ASPIRIN 81 MG: 81 TABLET, COATED ORAL at 08:39

## 2023-07-15 NOTE — PLAN OF CARE
Goal Outcome Evaluation:              Outcome Evaluation: pt answers orientation questions correctly. very forgetful, and forgets quickly. anxious about using bed pans and keeping IV lines in. antianxiety medication administered per order.

## 2023-07-15 NOTE — PROGRESS NOTES
[facility]  INPATIENT PROGRESS NOTE    Patient Name: Sherlyn Gutierrez  Date of Admission: 7/12/2023  Today's Date: 07/15/23  Length of Stay: 3  Primary Care Physician: Ilia Serra MD    Subjective   Chief Complaint: CVA/carotid stenosis    HPI   Left-sided weakness.  Blood pressure slightly high, afebrile.  Patient is on room air.  Glucose been stable.    Review of Systems   Constitutional:  Positive for activity change, appetite change and fatigue. Negative for chills and fever.   HENT:  Negative for hearing loss, nosebleeds, tinnitus and trouble swallowing.    Eyes:  Negative for visual disturbance.   Respiratory:  Negative for cough, chest tightness, shortness of breath and wheezing.    Cardiovascular:  Negative for chest pain, palpitations and leg swelling.   Gastrointestinal:  Negative for abdominal distention, abdominal pain, blood in stool, constipation, diarrhea, nausea and vomiting.   Endocrine: Negative for cold intolerance, heat intolerance, polydipsia, polyphagia and polyuria.   Genitourinary:  Negative for decreased urine volume, difficulty urinating, dysuria, flank pain, frequency and hematuria.   Musculoskeletal:  Positive for arthralgias, gait problem and myalgias. Negative for joint swelling.        Left-sided weakness   Skin:  Negative for rash.   Allergic/Immunologic: Negative for immunocompromised state.   Neurological:  Positive for weakness. Negative for dizziness, syncope, light-headedness and headaches.   Hematological:  Negative for adenopathy. Does not bruise/bleed easily.   Psychiatric/Behavioral:  Negative for confusion and sleep disturbance. The patient is not nervous/anxious.       All pertinent negatives and positives are as above. All other systems have been reviewed and are negative unless otherwise stated.     Objective    Temp:  [98.1 °F (36.7 °C)-98.2 °F (36.8 °C)] 98.1 °F (36.7 °C)  Heart Rate:  [72-92] 92  Resp:  [16]  16  BP: (149-174)/(61-74) 158/74  Physical Exam  Vitals and nursing note reviewed.   Constitutional:       Comments: Advanced age.  Cachectic.   HENT:      Head: Normocephalic.   Eyes:      Conjunctiva/sclera: Conjunctivae normal.      Pupils: Pupils are equal, round, and reactive to light.   Neck:      Vascular: No JVD.   Cardiovascular:      Rate and Rhythm: Normal rate and regular rhythm.      Heart sounds: Normal heart sounds.   Pulmonary:      Effort: No respiratory distress.      Breath sounds: No wheezing or rales.      Comments: Diminished breath sound bilateral, clear, on room air.  Chest:      Chest wall: No tenderness.   Abdominal:      General: Bowel sounds are normal. There is no distension.      Palpations: Abdomen is soft.      Tenderness: There is no abdominal tenderness.   Musculoskeletal:         General: No tenderness or deformity.      Cervical back: Neck supple.   Skin:     General: Skin is warm and dry.      Findings: No rash.   Neurological:      Mental Status: She is alert.      Cranial Nerves: No cranial nerve deficit.      Motor: Weakness present. No abnormal muscle tone.      Coordination: Coordination abnormal.      Gait: Gait abnormal.      Deep Tendon Reflexes: Reflexes normal.      Comments: Left-sided weakness   Psychiatric:         Mood and Affect: Mood normal.         Behavior: Behavior normal.         Results Review:  I have reviewed the labs, radiology results, and diagnostic studies.    Laboratory Data:   Results from last 7 days   Lab Units 07/13/23  0418 07/12/23  0052   WBC 10*3/mm3 11.65* 11.54*   HEMOGLOBIN g/dL 12.7 12.2   HEMATOCRIT % 39.7 37.3   PLATELETS 10*3/mm3 215 208        Results from last 7 days   Lab Units 07/13/23  0418 07/12/23  0811 07/12/23  0052   SODIUM mmol/L 140 142 141   POTASSIUM mmol/L 3.8 3.7 3.0*   CHLORIDE mmol/L 105 107 105   CO2 mmol/L 22.0 23.0 24.0   BUN mg/dL 11 7* 7*   CREATININE mg/dL 0.84 0.69 0.77   CALCIUM mg/dL 9.2 8.6 9.2   BILIRUBIN  mg/dL 0.9  --  0.6   ALK PHOS U/L 109  --  108   ALT (SGPT) U/L 9  --  8   AST (SGOT) U/L 23  --  14   GLUCOSE mg/dL 188* 200* 209*       Culture Data:   No results found for: BLOODCX, URINECX, WOUNDCX, MRSACX, RESPCX, STOOLCX    Radiology Data:   Imaging Results (Last 24 Hours)       ** No results found for the last 24 hours. **            I have reviewed the patient's current medications.     Assessment/Plan   Assessment  Active Hospital Problems    Diagnosis     **Hypertensive urgency     Syncope and collapse     Type 2 myocardial infarction due to hypertension     History of coronary artery bypass graft     Hyperlipidemia     Hypokalemia     Hyperglycemia        Treatment Plan  Stroke/carotid stenosis.  Vascular consult.  MRI of the head- Findings of acute nonhemorrhagic ischemia of the medial superior  right mid to posterior frontal lobe within the right MCA distribution, No intracranial mass or abnormal intracranial enhancement, Moderate chronic small vessel ischemic change.  CTA of the neck-Densely calcified plaque of the proximal internal carotid arteries resulting in 70% stenosis of the proximal right ICA and 60% stenosis proximal left ICA, hypoplastic right vertebral artery, Small caliber basilar artery system, No aneurysm or dissection.  Outpatient follow-up with Dr. Quiñonez.     Hypertension/CAD/hyperlipidemia.  Aspirin.  Plavix.  Lipitor.  Lisinopril.  Echocardiogram-ejection fraction 56%, moderate to severe septal asymmetric hypertrophy, nonobstructive hypertrophic cardiomyopathy, diastolic dysfunction grade 1, tricuspid regurgitation, saline tests-normal.    Reflux.  Maalox.  Zofran as needed.    Anxiety/insomnia.  Atarax as needed.  Melatonin at night.  Restoril at night.    Diabetes.  Hemoglobin 7.0.    Advanced age.  78 years old.    Nutrition . regular/house/cardiac diet.    Deconditioning.  PT and OT consult.    Plan for rehab placement.  [facility]    Medical Decision Making  Number  and Complexity of problems: Stroke/carotid stenosis/hypertension  Differential Diagnosis: None    Conditions and Status        Condition is unchanged.     MDM Data  External documents reviewed: Previous note .  Cardiac tracing (EKG, telemetry) interpretation: Sinus .  Radiology interpretation: None.  Labs reviewed: Laboratory .  Any tests that were considered but not ordered: Lab in AM.     Decision rules/scores evaluated (example [id number], [place], etc): None.     Discussed with: Patient.     Care Planning  Shared decision making: Patient .  Code status and discussions: Full code.    Disposition  Social Determinants of Health that impact treatment or disposition: Plan for rehab placement  1 to 4 days    Electronically signed by Osbaldo Lynch MD, 07/15/23, 08:20 CDT.

## 2023-07-15 NOTE — THERAPY TREATMENT NOTE
Acute Care - Physical Therapy Treatment Note  [facility]     Patient Name: Sherlyn Gutierrez  : 1945  MRN: [id number]  Today's Date: 7/15/2023      Visit Dx:     ICD-10-CM ICD-9-CM   1. Syncope and collapse  R55 780.2   2. Dysphagia, unspecified type  R13.10 787.20   3. Decreased independence with activities of daily living [Z78.9 (ICD-10-CM)]  Z78.9 V49.89   4. Impaired mobility [Z74.09 (ICD-10-CM)]  Z74.09 799.89   5. Cognitive and behavioral changes  R41.89 799.59    R46.89 312.9     Patient Active Problem List   Diagnosis    Syncope and collapse    Hypertensive urgency    Type 2 myocardial infarction due to hypertension    History of coronary artery bypass graft    Hyperlipidemia    Hypokalemia    Hyperglycemia     Past Medical History:   Diagnosis Date    Hyperlipidemia     Hypertension      Past Surgical History:   Procedure Laterality Date    [place] RPR ANOM CORONARY ARTERY PULM ART ORIGIN GRAFT       PT Assessment (last 12 hours)       PT Evaluation and Treatment       Row Name 07/15/23 0940          Physical Therapy Time and Intention    Subjective Information --  pt is very anxious; states she has to pee. She has been on bed pan several times this am.  -KJ     Document Type therapy note (daily note)  -KJ     Mode of Treatment physical therapy  -KJ     Patient Effort adequate  -KJ       Row Name 07/15/23 0940          General Information    Existing Precautions/Restrictions fall  L side weakness  -KJ       Row Name 07/15/23 0940          Pain    Pretreatment Pain Rating 0/10 - no pain  -KJ     Posttreatment Pain Rating 0/10 - no pain  -KJ       Row Name 07/15/23 0940          Bed Mobility    Supine-Sit [place] (Bed Mobility) verbal cues;moderate assist (50% patient effort);maximum assist (25% patient effort)  -KJ     Sit-Supine [place] (Bed Mobility) moderate assist (50% patient effort);maximum assist (25% patient effort)  -KJ     Bed Mobility, Safety Issues decreased use of arms for  pushing/pulling;decreased use of legs for bridging/pushing;impaired trunk control for bed mobility  -KJ       Row Name 07/15/23 0940          Sit-Stand Transfer    Sit-Stand [place] (Transfers) verbal cues;moderate assist (50% patient effort)  -KJ     Comment, (Sit-Stand Transfer) stood in front of patient assisting pt in pushing bottom in and cues for shoulder back. Able to stand upright for approx 1 minute. LLE hyperextended  -KJ       Row Name 07/15/23 0940          Stand-Sit Transfer    Stand-Sit [place] (Transfers) moderate assist (50% patient effort)  -KJ       Row Name 07/15/23 0940          Balance    Balance Assessment sitting static balance  -KJ     Static Sitting Balance verbal cues;minimal assist;contact guard  -KJ     Dynamic Sitting Balance moderate assist;maximum assist  -KJ     Comment, Balance moderate cues to maintain upright neutral position, wants to push off with RUE towards L too far. Lateral flex push ups  -KJ       Row Name 07/15/23 0940          Motor Skills    Therapeutic Exercise aerobic  -KJ       Row Name 07/15/23 0940          Aerobic Exercise    Comment, Aerobic Exercise (Therapeutic Exercise) AROM RUE/LE, A/AAROM LUE, PROM LLE  -KJ       Row Name 07/15/23 0940          Positioning and Restraints    Pre-Treatment Position in bed  -KJ     Post Treatment Position bed  -KJ     In Bed call light within reach  -KJ               User Key  (r) = Recorded By, (t) = Taken By, (c) = Cosigned By      Initials Name Provider Type    Cora Handy, PTA Physical Therapist Assistant                    Physical Therapy Education       Title: PT OT SLP Therapies (In Progress)       Topic: Physical Therapy (In Progress)       Point: Mobility training (In Progress)       Learning Progress Summary             Patient Acceptance, E, NR by OKSANA at 7/13/2023 0898    Comment: bed mobility                         Point: Home exercise program (Not Started)       Learner Progress:  Not documented in  this visit.              Point: Body mechanics (Not Started)       Learner Progress:  Not documented in this visit.              Point: Precautions (Done)       Learning Progress Summary             Patient Acceptance, E, VU by  at 7/12/2023 1102    Comment: Educated pt. on not getting out of bed and needing assitance.                                         User Key       Initials Effective Dates Name Provider Type Discipline     02/03/23 -  Bessy Haq, PTA Physical Therapist Assistant PT     05/25/23 -  Laure Ornelas, PT Student PT Student PT                  PT Recommendation and Plan     Plan of Care Reviewed With: patient  Progress: improving  Outcome Evaluation: PT tx completed. Pt denies any pn. Follows direction, but needs contant cues to stay on tasks. She is very anxious and fidigty. Cannot stay focused on task at hand.  Mod/MaxA sup<>sit, sit edge of bed Min/CG x 15 minutes. Constant redirection for safety and sitting mechanics. AAROM LUE, PROM LLE, AROM RUE/LE. Stood at bedside ModA working on static standing posture. Not ready for steps due to LLE flaccidity. Recommend inpatient rehab vs SNF.   Outcome Measures       Row Name 07/15/23 1000 07/14/23 1500 07/13/23 0800       How much help from another person do you currently need...    Turning from your back to your side while in flat bed without using bedrails? 2  -KJ 2  -KJ 3  -OKSANA    Moving from lying on back to sitting on the side of a flat bed without bedrails? 1  -KJ 2  -KJ 3  -OKSANA    Moving to and from a bed to a chair (including a wheelchair)? 1  -KJ 1  -KJ 1  -OKSANA    Standing up from a chair using your arms (e.g., wheelchair, bedside chair)? 1  -KJ 1  -KJ 1  -OKSANA    Climbing 3-5 steps with a railing? 1  -KJ 1  -KJ 1  -OKSANA    To walk in hospital room? 1  -KJ 1  -KJ 1  -OKSANA    AM-PAC 6 Clicks Score (PT) 7  -KJ 8  -KJ 10  -OKSANA       Functional Assessment    Outcome Measure Options AM-PAC 6 Clicks Basic Mobility (PT)  -KJ AM-PAC 6 Clicks Basic  Mobility (PT)  -KJ --              User Key  (r) = Recorded By, (t) = Taken By, (c) = Cosigned By      Initials Name Provider Type    Cora Handy, OSMAN Physical Therapist Assistant    Bessy Joseph, OSMAN Physical Therapist Assistant                     Time Calculation:    PT Charges       Row Name 07/15/23 1015             Time Calculation    Start Time 0940  -KJ      Stop Time 1018  -KJ      Time Calculation (min) 38 min  -KJ      PT Received On 07/15/23  -KJ      PT Goal Re-Cert Due Date 07/22/23  -KJ         Time Calculation- PT    Total Timed Code Minutes- PT 38 minute(s)  -KJ                User Key  (r) = Recorded By, (t) = Taken By, (c) = Cosigned By      Initials Name Provider Type    Cora Handy, PTA Physical Therapist Assistant                  Therapy Charges for Today       Code Description Service Date Service Provider Modifiers Qty    63236416991 HC PT THER PROC EA 15 MIN 7/14/2023 Cora Arredondo, PTA GP 1    01604471561 HC PT THERAPEUTIC ACT EA 15 MIN 7/14/2023 Cora Arredondo, PTA GP 2    64579451365 HC PT THER PROC EA 15 MIN 7/15/2023 Cora Arredondo, PTA GP 1    21126691022 HC PT THERAPEUTIC ACT EA 15 MIN 7/15/2023 Cora Arredondo, PTA GP 2            PT G-Codes  Outcome Measure Options: AM-PAC 6 Clicks Basic Mobility (PT)  AM-PAC 6 Clicks Score (PT): 7  AM-PAC 6 Clicks Score (OT): 13  Modified [place] Scale: 5 - Severe disability.  Bedridden, incontinent, and requiring constant nursing care and attention.    Cora Arredondo PTA  7/15/2023

## 2023-07-15 NOTE — PLAN OF CARE
Goal Outcome Evaluation:  Plan of Care Reviewed With: patient        Progress: improving  Outcome Evaluation: PT tx completed. Pt denies any pn. Follows direction, but needs contant cues to stay on tasks. She is very anxious and fidigty. Cannot stay focused on task at hand.  Mod/MaxA sup<>sit, sit edge of bed Min/CG x 15 minutes. Constant redirection for safety and sitting mechanics. AAROM LUE, PROM LLE, AROM RUE/LE. Stood at bedside ModA working on static standing posture. Not ready for steps due to LLE flaccidity. Recommend inpatient rehab vs SNF.

## 2023-07-16 LAB
ALBUMIN SERPL-MCNC: 4.3 G/DL (ref 3.5–5.2)
ALBUMIN/GLOB SERPL: 1.9 G/DL
ALP SERPL-CCNC: 114 U/L (ref 39–117)
ALT SERPL W P-5'-P-CCNC: 10 U/L (ref 1–33)
ANION GAP SERPL CALCULATED.3IONS-SCNC: 17 MMOL/L (ref 5–15)
AST SERPL-CCNC: 17 U/L (ref 1–32)
BASOPHILS # BLD AUTO: 0.03 10*3/MM3 (ref 0–0.2)
BASOPHILS NFR BLD AUTO: 0.3 % (ref 0–1.5)
BILIRUB SERPL-MCNC: 0.5 MG/DL (ref 0–1.2)
BUN SERPL-MCNC: 27 MG/DL (ref 8–23)
BUN/CREAT SERPL: 29.7 (ref 7–25)
CALCIUM SPEC-SCNC: 9.3 MG/DL (ref 8.6–10.5)
CHLORIDE SERPL-SCNC: 104 MMOL/L (ref 98–107)
CO2 SERPL-SCNC: 21 MMOL/L (ref 22–29)
CREAT SERPL-MCNC: 0.91 MG/DL (ref 0.57–1)
DEPRECATED RDW RBC AUTO: 45.1 FL (ref 37–54)
EGFRCR SERPLBLD CKD-EPI 2021: 64.7 ML/MIN/1.73
EOSINOPHIL # BLD AUTO: 0.05 10*3/MM3 (ref 0–0.4)
EOSINOPHIL NFR BLD AUTO: 0.5 % (ref 0.3–6.2)
ERYTHROCYTE [DISTWIDTH] IN BLOOD BY AUTOMATED COUNT: 13 % (ref 12.3–15.4)
GLOBULIN UR ELPH-MCNC: 2.3 GM/DL
GLUCOSE SERPL-MCNC: 158 MG/DL (ref 65–99)
HCT VFR BLD AUTO: 41.4 % (ref 34–46.6)
HGB BLD-MCNC: 13.2 G/DL (ref 12–15.9)
IMM GRANULOCYTES # BLD AUTO: 0.04 10*3/MM3 (ref 0–0.05)
IMM GRANULOCYTES NFR BLD AUTO: 0.4 % (ref 0–0.5)
LYMPHOCYTES # BLD AUTO: 0.8 10*3/MM3 (ref 0.7–3.1)
LYMPHOCYTES NFR BLD AUTO: 7.7 % (ref 19.6–45.3)
MCH RBC QN AUTO: 30.1 PG (ref 26.6–33)
MCHC RBC AUTO-ENTMCNC: 31.9 G/DL (ref 31.5–35.7)
MCV RBC AUTO: 94.3 FL (ref 79–97)
MONOCYTES # BLD AUTO: 0.97 10*3/MM3 (ref 0.1–0.9)
MONOCYTES NFR BLD AUTO: 9.3 % (ref 5–12)
NEUTROPHILS NFR BLD AUTO: 8.55 10*3/MM3 (ref 1.7–7)
NEUTROPHILS NFR BLD AUTO: 81.8 % (ref 42.7–76)
NRBC BLD AUTO-RTO: 0 /100 WBC (ref 0–0.2)
PLATELET # BLD AUTO: 218 10*3/MM3 (ref 140–450)
PMV BLD AUTO: 10.5 FL (ref 6–12)
POTASSIUM SERPL-SCNC: 3.6 MMOL/L (ref 3.5–5.2)
PROT SERPL-MCNC: 6.6 G/DL (ref 6–8.5)
RBC # BLD AUTO: 4.39 10*6/MM3 (ref 3.77–5.28)
SODIUM SERPL-SCNC: 142 MMOL/L (ref 136–145)
WBC NRBC COR # BLD: 10.44 10*3/MM3 (ref 3.4–10.8)

## 2023-07-16 PROCEDURE — 80053 COMPREHEN METABOLIC PANEL: CPT | Performed by: FAMILY MEDICINE

## 2023-07-16 PROCEDURE — 97530 THERAPEUTIC ACTIVITIES: CPT

## 2023-07-16 PROCEDURE — 85025 COMPLETE CBC W/AUTO DIFF WBC: CPT | Performed by: FAMILY MEDICINE

## 2023-07-16 RX ORDER — LOPERAMIDE HYDROCHLORIDE 2 MG/1
4 CAPSULE ORAL 4 TIMES DAILY PRN
Status: DISCONTINUED | OUTPATIENT
Start: 2023-07-16 | End: 2023-07-20 | Stop reason: HOSPADM

## 2023-07-16 RX ORDER — LISINOPRIL 10 MG/1
10 TABLET ORAL
Status: DISCONTINUED | OUTPATIENT
Start: 2023-07-17 | End: 2023-07-20 | Stop reason: HOSPADM

## 2023-07-16 RX ORDER — METOPROLOL SUCCINATE 25 MG/1
12.5 TABLET, EXTENDED RELEASE ORAL
Status: DISCONTINUED | OUTPATIENT
Start: 2023-07-16 | End: 2023-07-20 | Stop reason: HOSPADM

## 2023-07-16 RX ADMIN — LISINOPRIL 20 MG: 20 TABLET ORAL at 08:20

## 2023-07-16 RX ADMIN — METOPROLOL SUCCINATE 12.5 MG: 25 TABLET, EXTENDED RELEASE ORAL at 14:49

## 2023-07-16 RX ADMIN — TEMAZEPAM 15 MG: 15 CAPSULE ORAL at 20:44

## 2023-07-16 RX ADMIN — LOPERAMIDE HYDROCHLORIDE 4 MG: 2 CAPSULE ORAL at 10:00

## 2023-07-16 RX ADMIN — Medication 10 ML: at 08:22

## 2023-07-16 RX ADMIN — HYDROXYZINE HYDROCHLORIDE 25 MG: 25 TABLET ORAL at 08:20

## 2023-07-16 RX ADMIN — ATORVASTATIN CALCIUM 80 MG: 40 TABLET ORAL at 20:44

## 2023-07-16 RX ADMIN — ASPIRIN 81 MG: 81 TABLET, COATED ORAL at 08:20

## 2023-07-16 RX ADMIN — HYDROXYZINE HYDROCHLORIDE 25 MG: 25 TABLET ORAL at 14:49

## 2023-07-16 RX ADMIN — CLOPIDOGREL BISULFATE 75 MG: 75 TABLET, FILM COATED ORAL at 08:20

## 2023-07-16 RX ADMIN — Medication 10 ML: at 20:44

## 2023-07-16 NOTE — PLAN OF CARE
Goal Outcome Evaluation:           Progress: improving  Outcome Evaluation: No acute events overnight. Pt AOx3-4, forgetful to situation. More calm and pleasant this shift compared to yesterday evening where pt was tearful, anxious, and restless. Short term memory deficit, continues to repeatedly ask the same questions within a few minutes of pt interaction. Pt is apologetic with reminders from staff, stating that she knows she can't remember. Pt slept for most of evening. Inc of bowel and bladder. Inc care provided. VSS on RA.

## 2023-07-16 NOTE — THERAPY TREATMENT NOTE
Acute Care - Physical Therapy Treatment Note  [facility]     Patient Name: Sherlyn Gutierrez  : 1945  MRN: [id number]  Today's Date: 2023      Visit Dx:     ICD-10-CM ICD-9-CM   1. Syncope and collapse  R55 780.2   2. Dysphagia, unspecified type  R13.10 787.20   3. Decreased independence with activities of daily living [Z78.9 (ICD-10-CM)]  Z78.9 V49.89   4. Impaired mobility [Z74.09 (ICD-10-CM)]  Z74.09 799.89   5. Cognitive and behavioral changes  R41.89 799.59    R46.89 312.9     Patient Active Problem List   Diagnosis    Syncope and collapse    Hypertensive urgency    Type 2 myocardial infarction due to hypertension    History of coronary artery bypass graft    Hyperlipidemia    Hypokalemia    Hyperglycemia     Past Medical History:   Diagnosis Date    Hyperlipidemia     Hypertension      Past Surgical History:   Procedure Laterality Date    SD RPR ANOM CORONARY ARTERY PULM ART ORIGIN GRAFT       PT Assessment (last 12 hours)       PT Evaluation and Treatment       Row Name 23 1447          Physical Therapy Time and Intention    Subjective Information complains of  that she has to pee. This is constant all day  -KJ     Document Type therapy note (daily note)  -KJ     Mode of Treatment physical therapy  -KJ     Patient Effort fair  -KJ     Comment pt has difficulty following tasks, due to nerves and the constant urge to pee.  -KJ       Row Name 23 1447          General Information    Existing Precautions/Restrictions fall  L side weakness  -KJ       Row Name 23 1447          Pain    Pretreatment Pain Rating 0/10 - no pain  -KJ     Posttreatment Pain Rating 0/10 - no pain  -KJ       Row Name 23 1447          Bed Mobility    Supine-Sit [place] (Bed Mobility) maximum assist (25% patient effort)  -KJ     Sit-Supine [place] (Bed Mobility) maximum assist (25% patient effort)  -KJ     Bed Mobility, Safety Issues decreased use of arms for pushing/pulling;decreased use of legs for  bridging/pushing;impaired trunk control for bed mobility;cognitive deficits limit understanding  -KJ     Assistive Device (Bed Mobility) draw sheet  -KJ       Row Name 07/16/23 1447          Sit-Stand Transfer    Sit-Stand [place] (Transfers) verbal cues;maximum assist (25% patient effort)  -KJ     Comment, (Sit-Stand Transfer) attempted standing bedside  -KJ       Row Name 07/16/23 1447          Stand-Sit Transfer    Stand-Sit [place] (Transfers) maximum assist (25% patient effort)  -KJ       Row Name 07/16/23 1447          Balance    Balance Assessment sitting static balance  -KJ     Static Sitting Balance verbal cues;minimal assist;moderate assist  -KJ     Dynamic Sitting Balance maximum assist  -KJ     Position, Sitting Balance sitting edge of bed  -KJ     Comment, Balance constant cues for focusing on task at hand. She has a constant urge to pee and wants to lay down.  -KJ       Row Name 07/16/23 1447          Aerobic Exercise    Comment, Aerobic Exercise (Therapeutic Exercise) AROM RLE/UE, AA/PROM LUE, PROM LLE  -KJ       Row Name 07/16/23 1447          Positioning and Restraints    Pre-Treatment Position in bed  -KJ     Post Treatment Position bed  -KJ     In Bed call light within reach;exit alarm on  -KJ               User Key  (r) = Recorded By, (t) = Taken By, (c) = Cosigned By      Initials Name Provider Type    Cora Handy, OSMAN Physical Therapist Assistant                    Physical Therapy Education       Title: PT OT SLP Therapies (In Progress)       Topic: Physical Therapy (In Progress)       Point: Mobility training (In Progress)       Learning Progress Summary             Patient Nonacceptance, E,TB, NL by MB at 7/15/2023 1739    Acceptance, E, NR by OKSANA at 7/13/2023 0854    Comment: bed mobility                         Point: Home exercise program (In Progress)       Learning Progress Summary             Patient Nonacceptance, E,TB, NL by MB at 7/15/2023 1737                          Point: Body mechanics (In Progress)       Learning Progress Summary             Patient Nonacceptance, E,TB, NL by MB at 7/15/2023 1739                         Point: Precautions (In Progress)       Learning Progress Summary             Patient Nonacceptance, E,TB, NL by MB at 7/15/2023 1739    Acceptance, E, VU by  at 7/12/2023 1102    Comment: Educated pt. on not getting out of bed and needing assitance.                                         User Key       Initials Effective Dates Name Provider Type Discipline     02/03/23 -  Bessy Haq PTA Physical Therapist Assistant PT    MB 10/14/22 -  Nina Jacobs, RN Registered Nurse Nurse     05/25/23 -  Laure Ornelas, PT Student PT Student PT                  PT Recommendation and Plan     Plan of Care Reviewed With: patient  Progress: improving  Outcome Evaluation: PT tx completed. Pt denies any pn. Difficulty following tasks or commands due to constant focus on urge to pee. She has been on bed pan several times today per nursing staff. She also is very nervous and this limits her ability to follow or participate with treatment. MaxA sup<>sit, Min/ModA for sitting balance, sit<>stand MaxA at bedside. Recommend SNFor inpatient rehab.   Outcome Measures       Row Name 07/16/23 1500 07/15/23 1000 07/14/23 1500       How much help from another person do you currently need...    Turning from your back to your side while in flat bed without using bedrails? 3  -KJ 2  -KJ 2  -KJ    Moving from lying on back to sitting on the side of a flat bed without bedrails? 2  -KJ 1  -KJ 2  -KJ    Moving to and from a bed to a chair (including a wheelchair)? 1  -KJ 1  -KJ 1  -KJ    Standing up from a chair using your arms (e.g., wheelchair, bedside chair)? 1  -KJ 1  -KJ 1  -KJ    Climbing 3-5 steps with a railing? 1  -KJ 1  -KJ 1  -KJ    To walk in hospital room? 1  -KJ 1  -KJ 1  -KJ    AM-PAC 6 Clicks Score (PT) 9  -KJ 7  -KJ 8  -KJ       Functional Assessment    Outcome  Measure Options AM-PAC 6 Clicks Basic Mobility (PT)  -KJ AM-PAC 6 Clicks Basic Mobility (PT)  -KJ AM-PAC 6 Clicks Basic Mobility (PT)  -KJ              User Key  (r) = Recorded By, (t) = Taken By, (c) = Cosigned By      Initials Name Provider Type    Cora Handy PTA Physical Therapist Assistant                     Time Calculation:    PT Charges       Row Name 07/16/23 1514             Time Calculation    Start Time 1447  -KJ      Stop Time 1514  -KJ      Time Calculation (min) 27 min  -KJ      PT Received On 07/16/23  -KJ      PT Goal Re-Cert Due Date 07/22/23  -KJ         Time Calculation- PT    Total Timed Code Minutes- PT 27 minute(s)  -KJ                User Key  (r) = Recorded By, (t) = Taken By, (c) = Cosigned By      Initials Name Provider Type    Cora Handy PTA Physical Therapist Assistant                  Therapy Charges for Today       Code Description Service Date Service Provider Modifiers Qty    83525645017 HC PT THER PROC EA 15 MIN 7/15/2023 Cora Arredondo, OSMAN GP 1    11278608544 HC PT THERAPEUTIC ACT EA 15 MIN 7/15/2023 Cora Arredondo, OSMAN GP 2    43085794170 HC PT THERAPEUTIC ACT EA 15 MIN 7/16/2023 Cora Arredondo, OSMAN GP 2            PT G-Codes  Outcome Measure Options: AM-PAC 6 Clicks Basic Mobility (PT)  AM-PAC 6 Clicks Score (PT): 9  AM-PAC 6 Clicks Score (OT): 13  Modified Haja Scale: 5 - Severe disability.  Bedridden, incontinent, and requiring constant nursing care and attention.    Cora Arredondo PTA  7/16/2023

## 2023-07-16 NOTE — PROGRESS NOTES
[facility]  INPATIENT PROGRESS NOTE    Patient Name: Sherlyn Gutierrez  Date of Admission: 7/12/2023  Today's Date: 07/16/23  Length of Stay: 4  Primary Care Physician: Ilia Serra MD    Subjective   Chief Complaint: CVA/carotid stenosis     HPI   Blood pressure is high, add Toprol.  Patient is on room air.  Long discussion with family about prognosis.  Ongoing physical therapy.  Waiting for rehab placement discussed with family.    Review of Systems   Constitutional:  Positive for activity change, appetite change and fatigue. Negative for chills and fever.   HENT:  Negative for hearing loss, nosebleeds, tinnitus and trouble swallowing.    Eyes:  Negative for visual disturbance.   Respiratory:  Negative for cough, chest tightness, shortness of breath and wheezing.    Cardiovascular:  Negative for chest pain, palpitations and leg swelling.   Gastrointestinal:  Negative for abdominal distention, abdominal pain, blood in stool, constipation, diarrhea, nausea and vomiting.   Endocrine: Negative for cold intolerance, heat intolerance, polydipsia, polyphagia and polyuria.   Genitourinary:  Negative for decreased urine volume, difficulty urinating, dysuria, flank pain, frequency and hematuria.   Musculoskeletal:  Positive for arthralgias, gait problem and myalgias. Negative for joint swelling.        Left-sided weakness   Skin:  Negative for rash.   Allergic/Immunologic: Negative for immunocompromised state.   Neurological:  Positive for weakness. Negative for dizziness, syncope, light-headedness and headaches.   Hematological:  Negative for adenopathy. Does not bruise/bleed easily.   Psychiatric/Behavioral:  Negative for confusion and sleep disturbance. The patient is not nervous/anxious.     All pertinent negatives and positives are as above. All other systems have been reviewed and are negative unless otherwise stated.     Objective    Temp:  [98 °F (36.7  °C)-98.8 °F (37.1 °C)] 98.4 °F (36.9 °C)  Heart Rate:  [84-92] 85  Resp:  [15-18] 17  BP: (143-174)/(58-77) 160/58  Physical Exam  Vitals and nursing note reviewed.   Constitutional:       Comments: Advanced age.  Cachectic.   HENT:      Head: Normocephalic.   Eyes:      Conjunctiva/sclera: Conjunctivae normal.      Pupils: Pupils are equal, round, and reactive to light.   Neck:      Vascular: No JVD.   Cardiovascular:      Rate and Rhythm: Normal rate and regular rhythm.      Heart sounds: Normal heart sounds.   Pulmonary:      Effort: No respiratory distress.      Breath sounds: No wheezing or rales.      Comments: Diminished breath sound bilateral, clear, on room air.  Chest:      Chest wall: No tenderness.   Abdominal:      General: Bowel sounds are normal. There is no distension.      Palpations: Abdomen is soft.      Tenderness: There is no abdominal tenderness.   Musculoskeletal:         General: No tenderness or deformity.      Cervical back: Neck supple.   Skin:     General: Skin is warm and dry.      Findings: No rash.   Neurological:      Mental Status: She is alert.      Cranial Nerves: No cranial nerve deficit.      Motor: Weakness present. No abnormal muscle tone.      Coordination: Coordination abnormal.      Gait: Gait abnormal.      Deep Tendon Reflexes: Reflexes normal.      Comments: Left-sided weakness/paralysis  Psychiatric:         Mood and Affect: Mood normal.         Behavior: Behavior normal.            Results Review:  I have reviewed the labs, radiology results, and diagnostic studies.    Laboratory Data:   Results from last 7 days   Lab Units 07/16/23  0553 07/13/23 0418 07/12/23  0052   WBC 10*3/mm3 10.44 11.65* 11.54*   HEMOGLOBIN g/dL 13.2 12.7 12.2   HEMATOCRIT % 41.4 39.7 37.3   PLATELETS 10*3/mm3 218 215 208        Results from last 7 days   Lab Units 07/16/23  0553 07/13/23  0418 07/12/23  0811 07/12/23  0052   SODIUM mmol/L 142 140 142 141   POTASSIUM mmol/L 3.6 3.8 3.7 3.0*    CHLORIDE mmol/L 104 105 107 105   CO2 mmol/L 21.0* 22.0 23.0 24.0   BUN mg/dL 27* 11 7* 7*   CREATININE mg/dL 0.91 0.84 0.69 0.77   CALCIUM mg/dL 9.3 9.2 8.6 9.2   BILIRUBIN mg/dL 0.5 0.9  --  0.6   ALK PHOS U/L 114 109  --  108   ALT (SGPT) U/L 10 9  --  8   AST (SGOT) U/L 17 23  --  14   GLUCOSE mg/dL 158* 188* 200* 209*       Culture Data:   No results found for: BLOODCX, URINECX, WOUNDCX, MRSACX, RESPCX, STOOLCX    Radiology Data:   Imaging Results (Last 24 Hours)       ** No results found for the last 24 hours. **            I have reviewed the patient's current medications.     Assessment/Plan   Assessment  Active Hospital Problems    Diagnosis     **Hypertensive urgency     Syncope and collapse     Type 2 myocardial infarction due to hypertension     History of coronary artery bypass graft     Hyperlipidemia     Hypokalemia     Hyperglycemia        Treatment Plan  Stroke/carotid stenosis.  Vascular consult.  Neurology consult.  MRI of the head- Findings of acute nonhemorrhagic ischemia of the medial superior  right mid to posterior frontal lobe within the right MCA distribution, No intracranial mass or abnormal intracranial enhancement, Moderate chronic small vessel ischemic change.  CTA of the neck-Densely calcified plaque of the proximal internal carotid arteries resulting in 70% stenosis of the proximal right ICA and 60% stenosis proximal left ICA, hypoplastic right vertebral artery, Small caliber basilar artery system, No aneurysm or dissection.  Outpatient follow-up with Dr. Quiñonez.      Hypertension/CAD/hyperlipidemia.  Aspirin.  Plavix.  Lipitor.  Lisinopril.  Add Toprol.  Echocardiogram-ejection fraction 56%, moderate to severe septal asymmetric hypertrophy, nonobstructive hypertrophic cardiomyopathy, diastolic dysfunction grade 1, tricuspid regurgitation, saline tests-normal.     Reflux.  Maalox.  Zofran as needed.     Anxiety/insomnia.  Atarax as needed.  Melatonin at night.  Restoril at  night.    Diarrhea.  Patient is out of the window for GI panel.  Imodium as needed.     Diabetes.  Hemoglobin A1C 7.0.     Advanced age.  78 years old.     Nutrition . regular/house/cardiac diet.     Deconditioning.  PT and OT consult.     Plan for rehab placement.  Plan for [facility] re[facility].     Medical Decision Making  Number and Complexity of problems: Stroke/carotid stenosis/hypertension  Differential Diagnosis: None     Conditions and Status        Condition is unchanged.     MDM Data  External documents reviewed: Previous note .  Cardiac tracing (EKG, telemetry) interpretation: Sinus .  Radiology interpretation: None.  Labs reviewed: Laboratory .  Any tests that were considered but not ordered: Lab in AM.     Decision rules/scores evaluated (example [id number], Wells, etc): None.     Discussed with: Patient.     Care Planning  Shared decision making: Patient .  Code status and discussions: Full code.     Disposition  Social Determinants of Health that impact treatment or disposition: Plan for rehab placement  Pending rehab placement.    Electronically signed by Osbaldo Lynch MD, 07/16/23, 12:22 CDT.

## 2023-07-16 NOTE — PLAN OF CARE
"Goal Outcome Evaluation:              Outcome Evaluation: pt more confused today. yelling out frequently for family members that are not present. fixated on \"going to the bathroom\". anxious, medicatied per MAR. hypertensive, medicated per MAR. works with therapy. incontinent, wears briefs.         "

## 2023-07-16 NOTE — PLAN OF CARE
Goal Outcome Evaluation:  Plan of Care Reviewed With: patient        Progress: improving  Outcome Evaluation: PT tx completed. Pt denies any pn. Difficulty following tasks or commands due to constant focus on urge to pee. She has been on bed pan several times today per nursing staff. She also is very nervous and this limits her ability to follow or participate with treatment. MaxA sup<>sit, Min/ModA for sitting balance, sit<>stand MaxA at bedside. Recommend SNFor inpatient rehab.

## 2023-07-17 LAB
ANION GAP SERPL CALCULATED.3IONS-SCNC: 14 MMOL/L (ref 5–15)
BUN SERPL-MCNC: 28 MG/DL (ref 8–23)
BUN/CREAT SERPL: 32.9 (ref 7–25)
CALCIUM SPEC-SCNC: 8.6 MG/DL (ref 8.6–10.5)
CHLORIDE SERPL-SCNC: 100 MMOL/L (ref 98–107)
CO2 SERPL-SCNC: 21 MMOL/L (ref 22–29)
CREAT SERPL-MCNC: 0.85 MG/DL (ref 0.57–1)
DEPRECATED RDW RBC AUTO: 44.1 FL (ref 37–54)
EGFRCR SERPLBLD CKD-EPI 2021: 70.2 ML/MIN/1.73
ERYTHROCYTE [DISTWIDTH] IN BLOOD BY AUTOMATED COUNT: 12.7 % (ref 12.3–15.4)
GLUCOSE SERPL-MCNC: 139 MG/DL (ref 65–99)
HCT VFR BLD AUTO: 40.9 % (ref 34–46.6)
HGB BLD-MCNC: 13.2 G/DL (ref 12–15.9)
MCH RBC QN AUTO: 30.3 PG (ref 26.6–33)
MCHC RBC AUTO-ENTMCNC: 32.3 G/DL (ref 31.5–35.7)
MCV RBC AUTO: 94 FL (ref 79–97)
PLATELET # BLD AUTO: 213 10*3/MM3 (ref 140–450)
PMV BLD AUTO: 10.4 FL (ref 6–12)
POTASSIUM SERPL-SCNC: 3.7 MMOL/L (ref 3.5–5.2)
QT INTERVAL: 406 MS
QT INTERVAL: 462 MS
QTC INTERVAL: 456 MS
QTC INTERVAL: 495 MS
RBC # BLD AUTO: 4.35 10*6/MM3 (ref 3.77–5.28)
SODIUM SERPL-SCNC: 135 MMOL/L (ref 136–145)
WBC NRBC COR # BLD: 10.79 10*3/MM3 (ref 3.4–10.8)

## 2023-07-17 PROCEDURE — 99498 ADVNCD CARE PLAN ADDL 30 MIN: CPT

## 2023-07-17 PROCEDURE — 85027 COMPLETE CBC AUTOMATED: CPT | Performed by: FAMILY MEDICINE

## 2023-07-17 PROCEDURE — 99223 1ST HOSP IP/OBS HIGH 75: CPT

## 2023-07-17 PROCEDURE — 99497 ADVNCD CARE PLAN 30 MIN: CPT

## 2023-07-17 PROCEDURE — 97535 SELF CARE MNGMENT TRAINING: CPT

## 2023-07-17 PROCEDURE — 97110 THERAPEUTIC EXERCISES: CPT

## 2023-07-17 PROCEDURE — 97530 THERAPEUTIC ACTIVITIES: CPT

## 2023-07-17 PROCEDURE — 80048 BASIC METABOLIC PNL TOTAL CA: CPT | Performed by: FAMILY MEDICINE

## 2023-07-17 RX ORDER — QUETIAPINE FUMARATE 25 MG/1
25 TABLET, FILM COATED ORAL NIGHTLY
Status: DISCONTINUED | OUTPATIENT
Start: 2023-07-17 | End: 2023-07-18

## 2023-07-17 RX ADMIN — LISINOPRIL 10 MG: 10 TABLET ORAL at 08:57

## 2023-07-17 RX ADMIN — ASPIRIN 81 MG: 81 TABLET, COATED ORAL at 08:57

## 2023-07-17 RX ADMIN — METOPROLOL SUCCINATE 12.5 MG: 25 TABLET, EXTENDED RELEASE ORAL at 08:57

## 2023-07-17 RX ADMIN — Medication 10 ML: at 08:57

## 2023-07-17 RX ADMIN — ATORVASTATIN CALCIUM 80 MG: 40 TABLET ORAL at 21:30

## 2023-07-17 RX ADMIN — LOPERAMIDE HYDROCHLORIDE 4 MG: 2 CAPSULE ORAL at 21:31

## 2023-07-17 RX ADMIN — QUETIAPINE FUMARATE 25 MG: 25 TABLET, FILM COATED ORAL at 21:30

## 2023-07-17 RX ADMIN — CLOPIDOGREL BISULFATE 75 MG: 75 TABLET, FILM COATED ORAL at 08:57

## 2023-07-17 RX ADMIN — Medication 10 ML: at 21:32

## 2023-07-17 NOTE — PLAN OF CARE
Goal Outcome Evaluation:              Outcome Evaluation: RD nutrition follow-up completed.  Per progress notes, pt having increased lethargy and agitation today.  Diet education not appropriate at this time.  PO intake average is 33% of 3 meals.  Will continue to monitor po intake, and follow for further d/c needs.

## 2023-07-17 NOTE — PLAN OF CARE
Goal Outcome Evaluation:           Progress: improving  Outcome Evaluation: No acute events overnight. AOx3, remains forgetful and asking the same questions over and over in a short time frame. Inc of bowel and bladder. Inc care provided. Pt slept for most of evening, awakened for nursing care. VSS. BP elevated.

## 2023-07-17 NOTE — PROGRESS NOTES
[facility] Medicine Services  INPATIENT PROGRESS NOTE    Patient Name: Sherlyn Gutierrez  Date of Admission: 7/12/2023  Today's Date: 07/17/23  Length of Stay: 5  Primary Care Physician: Ilia Serra MD    Subjective   Chief Complaint: CVA/carotid stenosis/dementia    HPI   Tmax 100.  T-current 97.7.  Diarrhea is improving, possible GI bug, unable due to stool panel because of regulation from the hospital.  Blood pressure stable.  Agitation, start Seroquel at night.    Review of Systems   Constitutional:  Positive for activity change, appetite change and fatigue. Negative for chills and fever.   HENT:  Negative for hearing loss, nosebleeds, tinnitus and trouble swallowing.    Eyes:  Negative for visual disturbance.   Respiratory:  Negative for cough, chest tightness, shortness of breath and wheezing.    Cardiovascular:  Negative for chest pain, palpitations and leg swelling.   Gastrointestinal:  Negative for abdominal distention, abdominal pain, blood in stool, constipation, diarrhea, nausea and vomiting.   Endocrine: Negative for cold intolerance, heat intolerance, polydipsia, polyphagia and polyuria.   Genitourinary:  Negative for decreased urine volume, difficulty urinating, dysuria, flank pain, frequency and hematuria.   Musculoskeletal:  Positive for arthralgias, gait problem and myalgias. Negative for joint swelling.        Left-sided weakness   Skin:  Negative for rash.   Allergic/Immunologic: Negative for immunocompromised state.   Neurological:  Positive for weakness. Negative for dizziness, syncope, light-headedness and headaches.   Hematological:  Negative for adenopathy. Does not bruise/bleed easily.   Psychiatric/Behavioral:  Negative for confusion and sleep disturbance. The patient is not nervous/anxious.     All pertinent negatives and positives are as above. All other systems have been reviewed and are negative unless otherwise stated.     Objective    Temp:   Surgery Instruction Sheet    You have been scheduled for surgery on 4/24/17 at 3:30 at [facility]. Please report to the Surgery Center at 2:30, this is approximately 1 hours prior to your surgery time. The Surgery Center is located on the [address] [place] [address] Street side of the [facility], [address]. You may or may not need to have a Pre-op Visit prior to your surgery. The Surgery Center nurse will call you directly and set this appointment with you. Bring a list of medications and your insurance cards with you. You may eat/drink prior to this visit. The Pre-op nurse will review your medical history, medications and give you additional instructions. They will also confirm your arrival time    Call your physician immediately if you notice a change in your health between the time you saw your physician and the day of surgery    If you take a blood thinner, please let us know. Call your ordering Doctor to make sure you can stop taking it prior to your surgery. STOP YOUR  ASPIRIN 10 DAYS PRIOR TO SURGERY. DO NOT TAKE IBUPROFEN, ADVIL, MOTRIN, ALEVE, EXCEDRIN, BC POWDER, GOODIES, FISH OIL OR ANY MEDICATION CONTAINING ASPIRIN 10 DAYS PRIOR TO YOUR SURGERY. MAY TAKE TYLENOL    Eat a light dinner the evening before your surgery. DO NOT EAT OR DRINK ANYTHING AFTER MIDNIGHT THE NIGHT BEFORE YOUR SURGERY. This includes water, chewing gum, lifesavers, etc.  The Pre op nurse will check with you about any medication that you may need to take the morning of surgery. Shower with a new bar of Dial, Safeguard (antibacterial) soap or solution given to you by Preop, the night before surgery. Do not use lotion, powder or deodorant on the skin after showering.   Wear loose, comfortable clothing the day of surgery and bring a container to store your contacts, eyeglasses, dentures, hearing aid, etc.  Do not bring money, valuables, jewelry, etc. to the hospital.      If you are having outpatient surgery, [97.7 °F (36.5 °C)-100 °F (37.8 °C)] 97.7 °F (36.5 °C)  Heart Rate:  [79-95] 79  Resp:  [16-18] 16  BP: (134-165)/(49-94) 135/60  Physical Exam  Vitals and nursing note reviewed.   Constitutional:       Comments: Advanced age.  Cachectic.   HENT:      Head: Normocephalic.   Eyes:      Conjunctiva/sclera: Conjunctivae normal.      Pupils: Pupils are equal, round, and reactive to light.   Neck:      Vascular: No JVD.   Cardiovascular:      Rate and Rhythm: Normal rate and regular rhythm.      Heart sounds: Normal heart sounds.   Pulmonary:      Effort: No respiratory distress.      Breath sounds: No wheezing or rales.      Comments: Diminished breath sound bilateral, clear, on room air.  Chest:      Chest wall: No tenderness.   Abdominal:      General: Bowel sounds are normal. There is no distension.      Palpations: Abdomen is soft.      Tenderness: There is no abdominal tenderness.   Musculoskeletal:         General: No tenderness or deformity.      Cervical back: Neck supple.   Skin:     General: Skin is warm and dry.      Findings: No rash.   Neurological:      Mental Status: She is alert.      Cranial Nerves: No cranial nerve deficit.      Motor: Weakness present. No abnormal muscle tone.      Coordination: Coordination abnormal.      Gait: Gait abnormal.      Deep Tendon Reflexes: Reflexes normal.      Comments: Left-sided weakness/paralysis  Psychiatric:         Mood and Affect: Mood normal.         Behavior: Behavior normal.          Results Review:  I have reviewed the labs, radiology results, and diagnostic studies.    Laboratory Data:   Results from last 7 days   Lab Units 07/17/23  0017 07/16/23 0553 07/13/23  0418   WBC 10*3/mm3 10.79 10.44 11.65*   HEMOGLOBIN g/dL 13.2 13.2 12.7   HEMATOCRIT % 40.9 41.4 39.7   PLATELETS 10*3/mm3 213 218 215        Results from last 7 days   Lab Units 07/17/23  0017 07/16/23  0553 07/13/23  0418 07/12/23  0811 07/12/23  0052   SODIUM mmol/L 135* 142 140   < > 141  someone must come with you the morning of surgery to drive you home. You can not drive for 24 hours after any anesthesia. Sometimes it is necessary to stay overnight and leave the next morning. This is still considered outpatient for insurance deductibles. Someone will still need to drive you home. If you have questions or concerns, please feel free to call Dr Bernardo Mtz at [phone]. If you need to cancel your surgery, please call as soon as possible.   POTASSIUM mmol/L 3.7 3.6 3.8   < > 3.0*   CHLORIDE mmol/L 100 104 105   < > 105   CO2 mmol/L 21.0* 21.0* 22.0   < > 24.0   BUN mg/dL 28* 27* 11   < > 7*   CREATININE mg/dL 0.85 0.91 0.84   < > 0.77   CALCIUM mg/dL 8.6 9.3 9.2   < > 9.2   BILIRUBIN mg/dL  --  0.5 0.9  --  0.6   ALK PHOS U/L  --  114 109  --  108   ALT (SGPT) U/L  --  10 9  --  8   AST (SGOT) U/L  --  17 23  --  14   GLUCOSE mg/dL 139* 158* 188*   < > 209*    < > = values in this interval not displayed.       Culture Data:   No results found for: BLOODCX, URINECX, WOUNDCX, MRSACX, RESPCX, STOOLCX    Radiology Data:   Imaging Results (Last 24 Hours)       ** No results found for the last 24 hours. **            I have reviewed the patient's current medications.     Assessment/Plan   Assessment  Active Hospital Problems    Diagnosis     **Hypertensive urgency     Syncope and collapse     Type 2 myocardial infarction due to hypertension     History of coronary artery bypass graft     Hyperlipidemia     Hypokalemia     Hyperglycemia        Treatment Plan  Stroke/carotid stenosis.  Vascular consult.  Neurology consult.  MRI of the head- Findings of acute nonhemorrhagic ischemia of the medial superior  right mid to posterior frontal lobe within the right MCA distribution, No intracranial mass or abnormal intracranial enhancement, Moderate chronic small vessel ischemic change.  CTA of the neck-Densely calcified plaque of the proximal internal carotid arteries resulting in 70% stenosis of the proximal right ICA and 60% stenosis proximal left ICA, hypoplastic right vertebral artery, Small caliber basilar artery system, No aneurysm or dissection.  Outpatient follow-up with Dr. Quiñonez.      Hypertension/CAD/hyperlipidemia.  Aspirin.  Plavix.  Lipitor.  Lisinopril. Toprol.  Echocardiogram-ejection fraction 56%, moderate to severe septal asymmetric hypertrophy, nonobstructive hypertrophic cardiomyopathy, diastolic dysfunction grade 1, tricuspid regurgitation,  saline tests-normal.    Dementia/agitation/anxiety/insomnia. Atarax as needed.  Melatonin at night.  Seroquel at night.     Reflux.  Maalox.  Zofran as needed.     Diarrhea.  Patient is out of the window for GI panel.  Imodium as needed.  Diarrhea is better today.     Diabetes.  Hemoglobin A1C 7.0.     Advanced age.  78 years old.     Nutrition . regular/house/cardiac diet.     Deconditioning.  PT and OT consult.     Plan for rehab placement.  Plan for [facility] rehab.     Medical Decision Making  Number and Complexity of problems: Stroke/carotid stenosis/hypertension  Differential Diagnosis: None     Conditions and Status        Condition is unchanged.     MDM Data  External documents reviewed: Previous note .  Cardiac tracing (EKG, telemetry) interpretation: Sinus .  Radiology interpretation: None.  Labs reviewed: Laboratory .  Any tests that were considered but not ordered: Lab in AM.     Decision rules/scores evaluated (example [id number], Wells, etc): None.     Discussed with: Patient.     Care Planning  Shared decision making: Patient .  Code status and discussions: Full code.     Disposition  Social Determinants of Health that impact treatment or disposition: Plan for rehab placement  Pending rehab placement.    Electronically signed by Osbaldo Lynch MD, 07/17/23, 10:32 CDT.

## 2023-07-17 NOTE — CONSULTS
[facility] Palliative Care Services  Initial Consult    Attending Physician: Osbaldo Lynch MD  Referring Provider: Osbaldo Lynch MD    Patient Name: Sherlyn Gutierrez  Date of Admission: 7/12/2023  Today's Date: 07/17/23     Reason for Referral: Goals of Care/Advance Care Planning and Comfort Care    Code Status and Medical Interventions:   Ordered at: 07/12/23 0434     Level Of Support Discussed With:    Patient     Code Status (Patient has no pulse and is not breathing):    CPR (Attempt to Resuscitate)     Medical Interventions (Patient has pulse or is breathing):    Full Support        Subjective     HPI: 78 y.o. female with past medical history including hyperlipidemia and hypertension.  Patient presented to [facility] on 7/12/2023 related to syncopal episode.  According to ED notes he reportedly fallen 3 times prior to presenting to ED.  Labs collected in ED were mostly unremarkable with exception of HS troponin T 45, potassium 3.0 and WBC 11.54.  ECG revealed normal sinus rhythm.  She was noted to be hypertensive in ED and started on Cardene drip.  Chest x-ray revealed shallow inspiration with vascular crowding and likely bibasilar atelectasis.  CT of head revealed no acute intracranial abnormalities although revealed mild generalized volume loss with moderate chronic small vessel ischemic changes.  Ethanol and UDS negative. She was admitted to the critical care unit for further work-up and treatment.  Noted to have flight of ideas and slight tremor per H&P.   MRI of brain obtained revealing findings of acute nonhemorrhagic ischemia of the medial superior right mid to posterior frontal lobe within the right MCA distribution.  Also noted to have moderate chronic small vessel ischemic changes however no intracranial mass or abnormal intracranial enhancement visualized per radiology report.  Neurology was consulted and made recommendations including further imaging for stroke work-up and  medications. Echocardiogram completed revealed left ventricular wall thickness is consistent with moderate to severe septal asymmetric hypertrophy consistent with nonobstructive hypertrophic cardiomyopathy, grade I diastolic dysfunction.  CTA of neck completed revealing densely calcified plaque of the proximal internal carotid arteries resulting in 70% stenosis of the proximal right ICA and 60% stenosis of proximal left ICA.  Hypoplastic right vertebral artery and small caliber basilar artery system also visualized.  CT of head negative for central LVO however radiology report notes decreased enhancement of the distal branches of the right MCA involving anterior watershed distribution up towards the vertex.  Vascular surgery has been consulted and recommended right TCAR in the future once she regains functionality to her left side.  She was transferred to the medical floor on 7/13/2023.  PT and OT have evaluated and recommended inpatient rehab versus SNF placement.  Referral has been made to [facility] per [facility] notes.  Labs collected today mostly unremarkable.  Palliative care has been consulted to discuss goals of care/comfort measures.   She is lying in bed, alert and in no apparent distress at time of exam.  Able to answer orientation questions correctly however having difficulty demonstrating ability to make complex medical decisions at this time.  Her grandson is present at bedside.     Advance Care Planning   Advanced Directives:  Obtained copy of POA and designation of HCS and faxed copies to medical records.     Advance Care Planning Discussion: Attempted to have care conference with Ms. Gutierrez however she is having difficulty demonstrating ability to make complex medical decisions at this time.  Met with YURIY and patient's daughter who also is designated as one of her HCS, Gila, regarding goals of care.  Discussed placement options including services at facilities.  After further discussion Gila expressed  "interest in finding placement in/near [place], [place] to pursue palliative/skilled services as Ms. Gutierrez is able to tolerate.  We discussed potential for her to experience decline or inability to work with therapy services and option of transitioning to hospice services at that time.  Gila demonstrated understanding.  We also discussed goals of care and Ms. Gutierrez's previously expressed wishes.  Gila provided copies of POA document which designates she and her brother, Roger, as healthcare surrogates.  We discussed medical priorities including CPR, intubation, NIPPV, etc.  After further discussion family have elected to de-escalate CODE STATUS to include NO CPR, NO cardioversion and DO NOT INTUBATE.  She reflected on her mother's decline in the last several years and shared she has watched her decline significantly just since last Wednesday.  She feels her mother has experienced increased weakness and losing will to \"fight.\"  Reports she would not wish to continue to live in current state as she is used to being able to ambulate and perform ADLs at baseline although she still had weakness.  We discussed alternative options as well including option of transitioning to comfort measures if/when were to experience further decline.  Family expressed excellent prognostic awareness.  Denied any questions and/or concerns at this time.  Support provided.     The patient receives support from her spouse, daughter, son, and extended family. Patient's children are her healthcare surrogate.    Due to the palliative care topics discussed including goals of care, treatment options, discharge options, medical priorities, and hospice services we will establish an advance care plan.      Review of Systems   Constitutional: Negative for chills and fever.   HENT:  Negative for congestion, sore throat and stridor.    Eyes:  Negative for pain, photophobia and visual disturbance.   Cardiovascular:  Negative for chest pain.   Respiratory:  " Negative for shortness of breath.    Endocrine: Negative for polydipsia, polyphagia and polyuria.   Skin:  Negative for dry skin, flushing and itching.   Gastrointestinal:  Negative for nausea and vomiting.   Genitourinary:  Positive for bladder incontinence.   Neurological:  Positive for focal weakness (left side).   Psychiatric/Behavioral:  Negative for depression. The patient does not have insomnia and is not nervous/anxious.    Allergic/Immunologic: Negative for environmental allergies, hives and persistent infections.     Pain Assessment  CPOT Facial Expression: 0-->relaxed, neutral  CPOT Body Movements: 0-->absence of movements  CPOT Muscle Tension: 0-->relaxed  Ventilator Compliance/Vocalization: 0-->talking in normal tone or no sound  CPOT Score: 0  PAINAD Breathin-->normal  PAINAD Negative Vocalization: 2-->repeated troubled calling out, loud moaning/groaning, crying  PAINAD Facial Expression: 0-->smiling or inexpressive  PAINAD Body Language: 1-->tense, distressed pacing, fidgeting  PAINAD Consolability: 0-->no need to console  PAINAD Score: 3  Pain Location: head, extremity  Pain Description: aching  Past Medical History:   Diagnosis Date    Hyperlipidemia     Hypertension       Past Surgical History:   Procedure Laterality Date    NV RPR ANOM CORONARY ARTERY PULM ART ORIGIN GRAFT        Social History     Socioeconomic History    Marital status:    Tobacco Use    Smoking status: Never    Smokeless tobacco: Never   Vaping Use    Vaping Use: Never used   Substance and Sexual Activity    Alcohol use: Never    Drug use: Never     History reviewed. No pertinent family history.   No Known Allergies    Objective   Diagnostics: Reviewed    Intake/Output Summary (Last 24 hours) at 2023 1437  Last data filed at 2023 1741  Gross per 24 hour   Intake 240 ml   Output --   Net 240 ml       Current medications patient is presently taking including all prescriptions, over-the-counter, herbals and  vitamin/mineral/dietary (nutritional) supplements with reviewed including route, type, dose and frequency and are current per MAR at time of dictation.  Current Facility-Administered Medications   Medication Dose Route Frequency Provider Last Rate Last Admin    acetaminophen (TYLENOL) tablet 650 mg  650 mg Oral Q4H PRN Igor Luevano DO   650 mg at 07/14/23 2116    Or    acetaminophen (TYLENOL) 160 MG/5ML solution 650 mg  650 mg Oral Q4H PRN Igor Luevano DO        aluminum-magnesium hydroxide-simethicone (MAALOX MAX) 400-400-40 MG/5ML suspension 7.5 mL  7.5 mL Oral Q4H PRN Tay Ernandez MD        aspirin EC tablet 81 mg  81 mg Oral Daily Igor Luevano DO   81 mg at 07/17/23 0857    atorvastatin (LIPITOR) tablet 80 mg  80 mg Oral Nightly Tay Ernandez MD   80 mg at 07/16/23 2044    bisacodyl (DULCOLAX) suppository 10 mg  10 mg Rectal Daily PRN Tay Ernandez MD        clopidogrel (PLAVIX) tablet 75 mg  75 mg Oral Daily Tay Ernandez MD   75 mg at 07/17/23 0857    hydrOXYzine (ATARAX) tablet 25 mg  25 mg Oral Q6H PRN Osbaldo Lynch MD   25 mg at 07/16/23 1449    lisinopril (PRINIVIL,ZESTRIL) tablet 10 mg  10 mg Oral Q24H Osbaldo Lynch MD   10 mg at 07/17/23 0857    loperamide (IMODIUM) capsule 4 mg  4 mg Oral 4x Daily PRN Osbaldo Lynch MD   4 mg at 07/16/23 1000    melatonin tablet 5 mg  5 mg Oral Nightly PRN Igor Luevano DO   5 mg at 07/14/23 2116    metoprolol succinate XL (TOPROL-XL) 24 hr tablet 12.5 mg  12.5 mg Oral Q24H Osbaldo Lynch MD   12.5 mg at 07/17/23 0857    ondansetron (ZOFRAN) injection 4 mg  4 mg Intravenous Q6H PRN Igor Luevano DO        QUEtiapine (SEROquel) tablet 25 mg  25 mg Oral Nightly Osbaldo Lynch MD        sodium chloride 0.9 % flush 10 mL  10 mL Intravenous PRN Jose Luis Vang DO        sodium chloride 0.9 % flush 10 mL  10 mL Intravenous PRN Igor Luevano DO        sodium chloride 0.9 % flush 10 mL  10 mL Intravenous Q12H  "Tay Ernandez MD   10 mL at 07/17/23 0857    sodium chloride 0.9 % flush 10 mL  10 mL Intravenous PRN Tay Ernandez MD        sodium chloride 0.9 % infusion 40 mL  40 mL Intravenous PRN LuevanoIgor PARKERDO        sodium chloride 0.9 % infusion 40 mL  40 mL Intravenous PRN Tay Ernandez MD            acetaminophen **OR** acetaminophen **OR** [DISCONTINUED] acetaminophen    aluminum-magnesium hydroxide-simethicone    bisacodyl    hydrOXYzine    loperamide    melatonin    ondansetron    sodium chloride    sodium chloride    sodium chloride    sodium chloride    sodium chloride  Assessment   /53 (BP Location: Left arm, Patient Position: Lying)   Pulse 72   Temp 98.1 °F (36.7 °C) (Oral)   Resp 16   Ht 167.6 cm (66\")   Wt 56.7 kg (125 lb)   SpO2 97%   BMI 20.18 kg/m²     Physical Exam  Vitals and nursing note reviewed.   Constitutional:       General: She is not in acute distress.  HENT:      Head: Normocephalic and atraumatic.   Eyes:      General: Lids are normal.      Extraocular Movements: Extraocular movements intact.   Neck:      Vascular: No JVD.      Trachea: Trachea normal.   Cardiovascular:      Rate and Rhythm: Normal rate.   Pulmonary:      Effort: Pulmonary effort is normal.   Musculoskeletal:      Cervical back: Neck supple.   Skin:     General: Skin is warm and dry.   Neurological:      Mental Status: She is alert and oriented to person, place, and time.      Cranial Nerves: Cranial nerve deficit (left side flaccid) present.   Psychiatric:         Behavior: Behavior is cooperative.      Functional status: Palliative Performance Scale Score: Performance 30% based on the following measures: Ambulation: Totally bed bound, Activity and Evidence of Disease: Unable to do any work, extensive evidence of disease, Self-Care: Total care required,  Intake: Reduced, LOC: Full, drowsy or confusion.  Nutritional status: Albumin 4.3. Body mass index is 20.18 kg/m²..  Patient status: " Disease state: Deteriorating despite treatments.    Impression/Problem List:  Cerebrovascular accident, right MCA  Syncope and collapse   Carotid stenosis, bilateral (R>L)   Impaired mobility   Decreased independence with activities of daily living   Hypertensive urgency   Left sided weakness/neglect  Diastolic dysfunction per echocardiogram   Hyperlipidemia   Type 2 myocardial infarction due to hypertension      Dysphagia        Plan / Recommendations     Palliative Care Encounter   Goals of care include CODE STATUS changes to include NO CPR with limited support interventions.    Prognosis is poor long-term secondary to MCA CVA, carotid artery stenosis, impaired mobility and decreased independence with ADLs,  hypertensive urgency and other comorbidities listed above.     Ms. Gutierrez having difficulty demonstrating ability to make complex medical decisions at this time.       Met with SW and patient's daughter who also is designated as one of her HCS, Gila, regarding goals of care.  Details of discussion above.      Family looking into SNF placement in/near [place], [place].  Plans for placement with palliative/skilled services as long as Ms. Gutierrez able to tolerate.   Discussed option and expectations of transitioning to hospice services if/when no longer able to tolerate therapy.     Medical priorities also discussed and family expressed wishes to deescalate CODE STATUS to include NO CPR, NO cardioversion and DO NOT INTUBATE.  Additional treatment options also discussed including further limited interventions versus comfort measures.     Will plan to see if patient/children interested in completing MOST form prior to discharge.     Family expressed excellent prognostic awareness.  Denied any questions and/or concerns at this time.  Support provided.     Thank you for allowing us to participate in patient's plan of care. Palliative Care Team will continue to follow patient. Will plan to follow up tomorrow or sooner if  needed.    Time spent:123 minutes spent reviewing medical and medication records, assessing and examining patient, discussing with family, answering questions, providing some guidance about a plan and documentation of care, and coordinating care with other healthcare members, with > 50% time spent face to face.   48 minutes spent on advance care planning.    Electronically signed by, FLORA Driscoll, 07/17/23.

## 2023-07-17 NOTE — CASE MANAGEMENT/SOCIAL WORK
Continued Stay Note  [facility]     Patient Name: Sherlyn Gutierrez  MRN: [id number]  Today's Date: 7/17/2023    Admit Date: 7/12/2023    Plan: Referral to [facility]   Discharge Plan       Row Name 07/17/23 1602       Plan    Post Acute Provider List Nursing Home    Provided Post Acute Provider Quality & Resource List? Yes    Post Acute Provider Quality and Resource List Nursing Home    Delivered To Support Person    Method of Delivery In person      Row Name 07/17/23 2235       Plan    Plan Comments PT does not qualify for acute rehab. PT has declined and cannot tolerate the required 3 hours per day of therapy. PT's family have expressed concerns about PT being able to rehab and are interested in palliative services at a skilled facility. SW spoke with PT's daughter and provided CMS compare list of facilities. SW suggested that they visit the facilities and decide where they would like to list. PT's daughter plans to do this and call SW back with listing preferences. SW will follow and assist as needed.                   Discharge Codes    No documentation.                 Expected Discharge Date and Time       Expected Discharge Date Expected Discharge Time    Jul 18, 2023               WOODROW Gonzalez

## 2023-07-17 NOTE — THERAPY TREATMENT NOTE
Acute Care - Physical Therapy Treatment Note  [facility]     Patient Name: Sherlyn Gutierrez  : 1945  MRN: [id number]  Today's Date: 2023      Visit Dx:     ICD-10-CM ICD-9-CM   1. Syncope and collapse  R55 780.2   2. Dysphagia, unspecified type  R13.10 787.20   3. Decreased independence with activities of daily living [Z78.9 (ICD-10-CM)]  Z78.9 V49.89   4. Impaired mobility [Z74.09 (ICD-10-CM)]  Z74.09 799.89   5. Cognitive and behavioral changes  R41.89 799.59    R46.89 312.9     Patient Active Problem List   Diagnosis    Syncope and collapse    Hypertensive urgency    Type 2 myocardial infarction due to hypertension    History of coronary artery bypass graft    Hyperlipidemia    Hypokalemia    Hyperglycemia     Past Medical History:   Diagnosis Date    Hyperlipidemia     Hypertension      Past Surgical History:   Procedure Laterality Date    CA RPR ANOM CORONARY ARTERY PULM ART ORIGIN GRAFT       PT Assessment (last 12 hours)       PT Evaluation and Treatment       Row Name 23 0850          Physical Therapy Time and Intention    Subjective Information complains of;fatigue  pt more lethargic today per nsg  -LY     Document Type therapy note (daily note)  -LY     Mode of Treatment physical therapy  -LY       Row Name 23 0850          General Information    Existing Precautions/Restrictions fall  L side weakness  -LY       Row Name 23 0850          Pain    Pretreatment Pain Rating 0/10 - no pain  -LY     Posttreatment Pain Rating 0/10 - no pain  -LY       Row Name 23 0850          Bed Mobility    Scooting/Bridging [place] (Bed Mobility) dependent (less than 25% patient effort)  -LY     Supine-Sit [place] (Bed Mobility) minimum assist (75% patient effort)  -LY     Sit-Supine [place] (Bed Mobility) minimum assist (75% patient effort)  -LY     Bed Mobility, Safety Issues decreased use of arms for pushing/pulling;decreased use of legs for bridging/pushing;impaired trunk  control for bed mobility;cognitive deficits limit understanding  -LY     Assistive Device (Bed Mobility) draw sheet  -LY       Row Name 07/17/23 0850          Transfers    Comment, (Transfers) did not attempt sit to stand t/f d/t increased fatigue  -LY       Row Name 07/17/23 0850          Motor Skills    Comments, Therapeutic Exercise PROM for BLEs, pt not able to follow commads d/t lethargy  -LY     Additional Documentation Comments, Therapeutic Exercise (Row)  -LY       Row Name 07/17/23 0850          Plan of Care Review    Plan of Care Reviewed With patient  -LY     Progress declining  -LY     Outcome Evaluation PT tx completed. Pt with increased lethargy on this date, nsg also reports this. Max x1 for supine to sit. Pt keeps her head down with increased anterior/ L lateral lean. Mod/max x1 for sitting balance. Pt able to pull head up periodically, however, had difficulty maintaining upright posture. PROM completed to BLEs while sitting EOB. Returns to bed with max x1. LUE propped on pillow. Will cont to follow. Recommend SNF or inpatient rehab upon d/c.  -LY       Row Name 07/17/23 0850          Positioning and Restraints    Pre-Treatment Position in bed  -LY     Post Treatment Position bed  -LY     In Bed fowlers;call light within reach;encouraged to call for assist;exit alarm on;LUE elevated  -LY               User Key  (r) = Recorded By, (t) = Taken By, (c) = Cosigned By      Initials Name Provider Type    Tavia Hill, PTA Physical Therapist Assistant                    Physical Therapy Education       Title: PT OT SLP Therapies (In Progress)       Topic: Physical Therapy (In Progress)       Point: Mobility training (In Progress)       Learning Progress Summary             Patient Nonacceptance, D, NL by MB at 7/16/2023 1651    Nonacceptance, E,TB, NL by MB at 7/15/2023 1739    Acceptance, E, NR by OKSANA at 7/13/2023 0854    Comment: bed mobility                         Point: Home exercise program (In  Progress)       Learning Progress Summary             Patient Nonacceptance, D, NL by MB at 7/16/2023 1651    Nonacceptance, E,TB, NL by MB at 7/15/2023 1739                         Point: Body mechanics (In Progress)       Learning Progress Summary             Patient Nonacceptance, D, NL by MB at 7/16/2023 1651    Nonacceptance, E,TB, NL by MB at 7/15/2023 1739                         Point: Precautions (In Progress)       Learning Progress Summary             Patient Nonacceptance, D, NL by MB at 7/16/2023 1651    Nonacceptance, E,TB, NL by MB at 7/15/2023 1739    Acceptance, E, VU by  at 7/12/2023 1102    Comment: Educated pt. on not getting out of bed and needing assitance.                                         User Key       Initials Effective Dates Name Provider Type Discipline    OKSANA 02/03/23 -  Bessy Haq PTA Physical Therapist Assistant PT    MB 10/14/22 -  Nina Jacobs, RN Registered Nurse Nurse     05/25/23 -  Laure Ornelas, SANDRA Student PT Student PT                  PT Recommendation and Plan  Anticipated Discharge Disposition (PT): inpatient rehabilitation facility  Plan of Care Reviewed With: patient  Progress: declining  Outcome Evaluation: PT tx completed. Pt with increased lethargy on this date, nsg also reports this. Max x1 for supine to sit. Pt keeps her head down with increased anterior/ L lateral lean. Mod/max x1 for sitting balance. Pt able to pull head up periodically, however, had difficulty maintaining upright posture. PROM completed to BLEs while sitting EOB. Returns to bed with max x1. LUE propped on pillow. Will cont to follow. Recommend SNF or inpatient rehab upon d/c.   Outcome Measures       Row Name 07/17/23 0850 07/16/23 1500 07/15/23 1000       How much help from another person do you currently need...    Turning from your back to your side while in flat bed without using bedrails? 2  -LY 3  -KJ 2  -KJ    Moving from lying on back to sitting on the side of a flat bed  without bedrails? 1  -LY 2  -KJ 1  -KJ    Moving to and from a bed to a chair (including a wheelchair)? 1  -LY 1  -KJ 1  -KJ    Standing up from a chair using your arms (e.g., wheelchair, bedside chair)? 1  -LY 1  -KJ 1  -KJ    Climbing 3-5 steps with a railing? 1  -LY 1  -KJ 1  -KJ    To walk in hospital room? 1  -LY 1  -KJ 1  -KJ    AM-PAC 6 Clicks Score (PT) 7  -LY 9  -KJ 7  -KJ       Functional Assessment    Outcome Measure Options AM-PAC 6 Clicks Basic Mobility (PT)  -LY AM-PAC 6 Clicks Basic Mobility (PT)  -KJ AM-PAC 6 Clicks Basic Mobility (PT)  -KJ      Row Name 07/14/23 1500             How much help from another person do you currently need...    Turning from your back to your side while in flat bed without using bedrails? 2  -KJ      Moving from lying on back to sitting on the side of a flat bed without bedrails? 2  -KJ      Moving to and from a bed to a chair (including a wheelchair)? 1  -KJ      Standing up from a chair using your arms (e.g., wheelchair, bedside chair)? 1  -KJ      Climbing 3-5 steps with a railing? 1  -KJ      To walk in hospital room? 1  -KJ      AM-PAC 6 Clicks Score (PT) 8  -KJ         Functional Assessment    Outcome Measure Options AM-PAC 6 Clicks Basic Mobility (PT)  -KJ                User Key  (r) = Recorded By, (t) = Taken By, (c) = Cosigned By      Initials Name Provider Type    Cora Handy, OSMAN Physical Therapist Assistant    Tavia Hill, OSMAN Physical Therapist Assistant                     Time Calculation:    PT Charges       Row Name 07/17/23 0918             Time Calculation    Start Time 0850  -LY      Stop Time 0914  -LY      Time Calculation (min) 24 min  -LY      PT Received On 07/17/23  -LY         Time Calculation- PT    Total Timed Code Minutes- PT 24 minute(s)  -LY         Timed Charges    69627 - PT Therapeutic Exercise Minutes 10  -LY      77212 - PT Therapeutic Activity Minutes 14  -LY         Total Minutes    Timed Charges Total Minutes 24  -LY        Total Minutes 24  -LY                User Key  (r) = Recorded By, (t) = Taken By, (c) = Cosigned By      Initials Name Provider Type    Tavia Hill PTA Physical Therapist Assistant                  Therapy Charges for Today       Code Description Service Date Service Provider Modifiers Qty    22827076173  PT THER PROC EA 15 MIN 7/17/2023 Tavia Rowe, OSMAN GP 1    [id number]  PT THERAPEUTIC ACT EA 15 MIN 7/17/2023 Tavia Rowe PTA GP 1            PT G-Codes  Outcome Measure Options: AM-PAC 6 Clicks Basic Mobility (PT)  AM-PAC 6 Clicks Score (PT): 7  AM-PAC 6 Clicks Score (OT): 13  Modified [place] Scale: 5 - Severe disability.  Bedridden, incontinent, and requiring constant nursing care and attention.    Tavia Rowe PTA  7/17/2023

## 2023-07-17 NOTE — PLAN OF CARE
Goal Outcome Evaluation:              Outcome Evaluation: pt a&ox1 to self. lethargic this shift. yells out at times. incontinent of bowel and bladder. family at bedside. agreeable to palliative care consult. code status updated. awaiting placement.          Care for catheter as per unit/ICU protocols

## 2023-07-17 NOTE — PLAN OF CARE
Goal Outcome Evaluation:  Plan of Care Reviewed With: patient        Progress: declining  Outcome Evaluation: PT tx completed. Pt with increased lethargy on this date, nsg also reports this. Max x1 for supine to sit. Pt keeps her head down with increased anterior/ L lateral lean. Mod/max x1 for sitting balance. Pt able to pull head up periodically, however, had difficulty maintaining upright posture. PROM completed to BLEs while sitting EOB. Returns to bed with max x1. LUE propped on pillow. Will cont to follow. Recommend SNF or inpatient rehab upon d/c.      Anticipated Discharge Disposition (PT): inpatient rehabilitation facility

## 2023-07-17 NOTE — THERAPY TREATMENT NOTE
Acute Care - Occupational Therapy Treatment Note  [facility]     Patient Name: Sherlyn Gutierrez  : 1945  MRN: [id number]  Today's Date: 2023             Admit Date: 2023       ICD-10-CM ICD-9-CM   1. Syncope and collapse  R55 780.2   2. Dysphagia, unspecified type  R13.10 787.20   3. Decreased independence with activities of daily living [Z78.9 (ICD-10-CM)]  Z78.9 V49.89   4. Impaired mobility [Z74.09 (ICD-10-CM)]  Z74.09 799.89   5. Cognitive and behavioral changes  R41.89 799.59    R46.89 312.9     Patient Active Problem List   Diagnosis    Syncope and collapse    Hypertensive urgency    Type 2 myocardial infarction due to hypertension    History of coronary artery bypass graft    Hyperlipidemia    Hypokalemia    Hyperglycemia     Past Medical History:   Diagnosis Date    Hyperlipidemia     Hypertension      Past Surgical History:   Procedure Laterality Date    MD RPR ANOM CORONARY ARTERY PULM ART ORIGIN GRAFT           OT ASSESSMENT FLOWSHEET (last 12 hours)       OT Evaluation and Treatment       Row Name 23 1523                   OT Time and Intention    Subjective Information complains of;pain  -TS        Document Type therapy note (daily note)  -TS        Mode of Treatment occupational therapy  -TS        Patient Effort fair  -TS        Comment constantly states she has to pee, RN and family aware. Pt is voiding but constantly feels the need to pee  -TS           General Information    Existing Precautions/Restrictions fall  -TS           Pain Assessment    Pretreatment Pain Rating 0/10 - no pain  -TS        Posttreatment Pain Rating 3/10  -TS        Pain Location - Side/Orientation Left  -TS        Pain Location - hand  -TS        Pain Intervention(s) Repositioned  -TS           Activities of Daily Living    BADL Assessment/Intervention toileting  -TS           Toileting Assessment/Training    [place] Level (Toileting) perform perineal hygiene;change pad/brief;maximum assist (25%  patient effort)  -TS        Position (Toileting) supine  -TS           Bed Mobility    Rolling Left Wing (Bed Mobility) maximum assist (25% patient effort)  -TS        Rolling Right Wing (Bed Mobility) maximum assist (25% patient effort)  -TS        Scooting/Bridging [place] (Bed Mobility) dependent (less than 25% patient effort)  -TS        Assistive Device (Bed Mobility) bed rails  -TS           Plan of Care Review    Plan of Care Reviewed With patient  -TS        Progress declining  -TS           Positioning and Restraints    Pre-Treatment Position in bed  -TS        Post Treatment Position bed  -TS        In Bed side lying left;call light within reach;encouraged to call for assist;with family/caregiver;side rails up x3;pillow between legs  -TS                  User Key  (r) = Recorded By, (t) = Taken By, (c) = Cosigned By      Initials Name Effective Dates    TS Venkat Vivienne SUZANNA, SAMPSON 02/03/23 -                      Occupational Therapy Education       Title: PT OT SLP Therapies (In Progress)       Topic: Occupational Therapy (In Progress)       Point: ADL training (In Progress)       Description:   Instruct learner(s) on proper safety adaptation and remediation techniques during self care or transfers.   Instruct in proper use of assistive devices.                  Learning Progress Summary             Patient Nonacceptance, D, NL by MB at 7/16/2023 1651    Nonacceptance, E,TB, NL by MB at 7/15/2023 1739    Acceptance, E,TB,D, VU,DU,NR by  at 7/14/2023 0745    Comment: Educated on toiletting skills and bedpan use.     by  at 7/13/2023 0922    Acceptance, E,D,TB, NR by  at 7/13/2023 0830    Acceptance, E, NR by EVAN at 7/12/2023 1116                         Point: Home exercise program (In Progress)       Description:   Instruct learner(s) on appropriate technique for monitoring, assisting and/or progressing therapeutic exercises/activities.                  Learning Progress Summary              Patient Nonacceptance, D, NL by MB at 7/16/2023 1651    Nonacceptance, E,TB, NL by MB at 7/15/2023 1739                         Point: Precautions (In Progress)       Description:   Instruct learner(s) on prescribed precautions during self-care and functional transfers.                  Learning Progress Summary             Patient Nonacceptance, D, NL by MB at 7/16/2023 1651    Nonacceptance, E,TB, NL by MB at 7/15/2023 1739    Acceptance, E, NR by IRWIN at 7/12/2023 1116                         Point: Body mechanics (In Progress)       Description:   Instruct learner(s) on proper positioning and spine alignment during self-care, functional mobility activities and/or exercises.                  Learning Progress Summary             Patient Nonacceptance, D, NL by MB at 7/16/2023 1651    Nonacceptance, E,TB, NL by MB at 7/15/2023 1739                                         User Key       Initials Effective Dates Name Provider Type Discipline     07/11/23 -  Caren Colón OTR/L, CSRS Occupational Therapist OT    MB 10/14/22 -  Nina Jacobs, RN Registered Nurse Nurse     05/03/23 -  Carmen Hernandez OT Student OT Student OT                      OT Recommendation and Plan     Plan of Care Review  Plan of Care Reviewed With: patient  Progress: declining  Plan of Care Reviewed With: patient     Outcome Measures       Row Name 07/17/23 1500 07/17/23 0850 07/16/23 1500       How much help from another person do you currently need...    Turning from your back to your side while in flat bed without using bedrails? -- 2  -LY 3  -KJ    Moving from lying on back to sitting on the side of a flat bed without bedrails? -- 1  -LY 2  -KJ    Moving to and from a bed to a chair (including a wheelchair)? -- 1  -LY 1  -KJ    Standing up from a chair using your arms (e.g., wheelchair, bedside chair)? -- 1  -LY 1  -KJ    Climbing 3-5 steps with a railing? -- 1  -LY 1  -KJ    To walk in hospital room? -- 1  -LY 1  -KJ     AM-PAC 6 Clicks Score (PT) -- 7  -LY 9  -KJ       How much help from another is currently needed...    Putting on and taking off regular lower body clothing? 2  -TS -- --    Bathing (including washing, rinsing, and drying) 2  -TS -- --    Toileting (which includes using toilet bed pan or urinal) 2  -TS -- --    Putting on and taking off regular upper body clothing 2  -TS -- --    Taking care of personal grooming (such as brushing teeth) 2  -TS -- --    Eating meals 2  -TS -- --    AM-PAC 6 Clicks Score (OT) 12  -TS -- --       Functional Assessment    Outcome Measure Options -- AM-PAC 6 Clicks Basic Mobility (PT)  -LY AM-PAC 6 Clicks Basic Mobility (PT)  -KJ      Row Name 07/15/23 1000             How much help from another person do you currently need...    Turning from your back to your side while in flat bed without using bedrails? 2  -KJ      Moving from lying on back to sitting on the side of a flat bed without bedrails? 1  -KJ      Moving to and from a bed to a chair (including a wheelchair)? 1  -KJ      Standing up from a chair using your arms (e.g., wheelchair, bedside chair)? 1  -KJ      Climbing 3-5 steps with a railing? 1  -KJ      To walk in hospital room? 1  -KJ      AM-PAC 6 Clicks Score (PT) 7  -KJ         Functional Assessment    Outcome Measure Options AM-PAC 6 Clicks Basic Mobility (PT)  -KJ                User Key  (r) = Recorded By, (t) = Taken By, (c) = Cosigned By      Initials Name Provider Type    Cora Handy, PTA Physical Therapist Assistant     Vivienne Robledo COTA Occupational Therapist Assistant    Tavia Hill, PTA Physical Therapist Assistant                    Time Calculation:    Time Calculation- OT       Row Name 07/17/23 1553             Time Calculation- OT    OT Start Time 1523  -TS      OT Stop Time 1550  -TS      OT Time Calculation (min) 27 min  -TS      Total Timed Code Minutes- OT 27 minute(s)  -TS      OT Received On 07/17/23  -TS         Timed  Charges    93394 - OT Self Care/Mgmt Minutes 27  -TS         Total Minutes    Timed Charges Total Minutes 27  -TS       Total Minutes 27  -TS                User Key  (r) = Recorded By, (t) = Taken By, (c) = Cosigned By      Initials Name Provider Type     Vivienne Robledo COTA Occupational Therapist Assistant                  Therapy Charges for Today       Code Description Service Date Service Provider Modifiers Qty    [id number] HC OT SELF CARE/MGMT/TRAIN EA 15 MIN 7/17/2023 Vivienne Robledo COTA GO 2                 CAMILLA Rice  7/17/2023

## 2023-07-18 ENCOUNTER — APPOINTMENT (OUTPATIENT)
Dept: ULTRASOUND IMAGING | Facility: HOSPITAL | Age: 78
DRG: 064 | End: 2023-07-18
Payer: MEDICARE

## 2023-07-18 PROCEDURE — 93971 EXTREMITY STUDY: CPT

## 2023-07-18 PROCEDURE — 97535 SELF CARE MNGMENT TRAINING: CPT

## 2023-07-18 PROCEDURE — 97530 THERAPEUTIC ACTIVITIES: CPT

## 2023-07-18 PROCEDURE — 93971 EXTREMITY STUDY: CPT | Performed by: SURGERY

## 2023-07-18 PROCEDURE — 99233 SBSQ HOSP IP/OBS HIGH 50: CPT

## 2023-07-18 PROCEDURE — 97110 THERAPEUTIC EXERCISES: CPT

## 2023-07-18 RX ORDER — HYDROCODONE BITARTRATE AND ACETAMINOPHEN 5; 325 MG/1; MG/1
1 TABLET ORAL EVERY 6 HOURS PRN
Status: DISCONTINUED | OUTPATIENT
Start: 2023-07-18 | End: 2023-07-18

## 2023-07-18 RX ORDER — TRAMADOL HYDROCHLORIDE 50 MG/1
25 TABLET ORAL EVERY 6 HOURS PRN
Status: DISCONTINUED | OUTPATIENT
Start: 2023-07-18 | End: 2023-07-20 | Stop reason: HOSPADM

## 2023-07-18 RX ORDER — QUETIAPINE FUMARATE 25 MG/1
12.5 TABLET, FILM COATED ORAL NIGHTLY
Status: DISCONTINUED | OUTPATIENT
Start: 2023-07-18 | End: 2023-07-20 | Stop reason: HOSPADM

## 2023-07-18 RX ADMIN — ASPIRIN 81 MG: 81 TABLET, COATED ORAL at 08:53

## 2023-07-18 RX ADMIN — ATORVASTATIN CALCIUM 80 MG: 40 TABLET ORAL at 21:21

## 2023-07-18 RX ADMIN — ACETAMINOPHEN 650 MG: 325 TABLET, FILM COATED ORAL at 13:13

## 2023-07-18 RX ADMIN — LISINOPRIL 10 MG: 10 TABLET ORAL at 08:52

## 2023-07-18 RX ADMIN — TRAMADOL HYDROCHLORIDE 25 MG: 50 TABLET, COATED ORAL at 21:21

## 2023-07-18 RX ADMIN — QUETIAPINE FUMARATE 12.5 MG: 25 TABLET, FILM COATED ORAL at 21:22

## 2023-07-18 RX ADMIN — Medication 10 ML: at 21:25

## 2023-07-18 RX ADMIN — CLOPIDOGREL BISULFATE 75 MG: 75 TABLET, FILM COATED ORAL at 08:53

## 2023-07-18 RX ADMIN — Medication 10 ML: at 08:53

## 2023-07-18 RX ADMIN — METOPROLOL SUCCINATE 12.5 MG: 25 TABLET, EXTENDED RELEASE ORAL at 08:52

## 2023-07-18 NOTE — PLAN OF CARE
Goal Outcome Evaluation:           Progress: improving  Outcome Evaluation: No acute events overnight. AOx2-3, forgetful to situation and time. Sleepy but easily aroused by minor stimulation. Occasionally yells out of room when family not present at bedside. Pt apologizes to staff when staff enters after her yelling for help, stating she just wants someone in her room with her. Pt appears more relaxed with a person present at bedside with her. Frequent rounding performed. Inc of bowel and bladder. Purewick utilized. PRN Imodium administered for multiple loose BMs. VSS on RA. Tmax 99.7, encouraged cough and deep breathe.

## 2023-07-18 NOTE — PLAN OF CARE
"Goal Outcome Evaluation:  Plan of Care Reviewed With: patient        Progress: improving  Outcome Evaluation: PT tx completed. Pt continues to c/o LUE and \"female parts\". Constant feeling and urge to urinate. Alert and answered most questions, although, did not follow many commands for treatment. Bed mobility MaxA, sit edge of bed x 20 minutes Roya. Cues to attempt upright static posture. PROM LLE, AA/PROM LUE, AROM RLE. Positioned pt on L side minimizing pressure to coccyx due to reddness. Barrier cream and mepilex applied to reddened area. Recommend SNF         "

## 2023-07-18 NOTE — PLAN OF CARE
Goal Outcome Evaluation:  Plan of Care Reviewed With: patient        Progress: improving  Outcome Evaluation: MaxA bed mobility glide pad utilized, pt alert and eyes open at time of attempt but needing cues to remain on task and alert while seated EOB. Pt balance ranged Mod-maxA with posterior and L lean. Attempted self feeding but pt unable to complete ADLs in sitting d/t her decreased overall balance and cognition/alert level. Pt c/o L wrist pain multiple times during OT tx, NSG notified and already aware. Rolled multiple times for glide pad adjustment MaxA. Placed on R side and pillows on bony prominences to decrease risk of skin breakdown. Recommend continued OT services at this time

## 2023-07-18 NOTE — CASE MANAGEMENT/SOCIAL WORK
Continued Stay Note  [facility]     Patient Name: Sherlyn Gutierrez  MRN: [id number]  Today's Date: 7/18/2023    Admit Date: 7/12/2023    [facility]   Discharge Plan       Row Name 07/18/23 1445       Plan    Plan Comments PT's family have visited facilities and have asked that PT be listed with [facility]. PT has been listed, will await bed offers or denials.                   Discharge Codes    No documentation.                 Expected Discharge Date and Time       Expected Discharge Date Expected Discharge Time    Jul 18, 2023               WOODROW Gonzalez

## 2023-07-18 NOTE — THERAPY TREATMENT NOTE
Patient Name: Sherlyn Gutierrez  : 1945    MRN: [id number]                              Today's Date: 2023       Admit Date: 2023    Visit Dx: Therapist utilized gait belt, applied non-slipped socks, provided fall risk education/prevention, & facilitated muscle strengthening PRN to reduce patient falls risk during this session.      ICD-10-CM ICD-9-CM   1. Syncope and collapse  R55 780.2   2. Dysphagia, unspecified type  R13.10 787.20   3. Decreased independence with activities of daily living [Z78.9 (ICD-10-CM)]  Z78.9 V49.89   4. Impaired mobility [Z74.09 (ICD-10-CM)]  Z74.09 799.89   5. Cognitive and behavioral changes  R41.89 799.59    R46.89 312.9     Patient Active Problem List   Diagnosis    Syncope and collapse    Hypertensive urgency    Type 2 myocardial infarction due to hypertension    History of coronary artery bypass graft    Hyperlipidemia    Hypokalemia    Hyperglycemia     Past Medical History:   Diagnosis Date    Hyperlipidemia     Hypertension      Past Surgical History:   Procedure Laterality Date    CT RPR ANOM CORONARY ARTERY PULM ART ORIGIN GRAFT        General Information       Row Name 23 0916          OT Time and Intention    Document Type therapy note (daily note)  -MT     Mode of Treatment occupational therapy  -MT       Row Name 23 0916          General Information    Patient Profile Reviewed yes  -MT     Existing Precautions/Restrictions fall  -MT       Row Name 23 0916          Cognition    Orientation Status (Cognition) oriented to;person;place;situation  -MT       Row Name 23 0916          Safety Issues, Functional Mobility    Impairments Affecting Function (Mobility) balance;endurance/activity tolerance;muscle tone abnormal;postural/trunk control;range of motion (ROM);strength  -MT     Cognitive Impairments, Mobility Safety/Performance attention;awareness, need for assistance;insight into deficits/self-awareness;problem-solving/reasoning;safety  precaution awareness;safety precaution follow-through;sequencing abilities  -MT               User Key  (r) = Recorded By, (t) = Taken By, (c) = Cosigned By      Initials Name Provider Type    Deborah Garces COTA Occupational Therapist Assistant                     Mobility/ADL's       Row Name 07/18/23 0916          Bed Mobility    Rolling Left Cavalier (Bed Mobility) maximum assist (25% patient effort)  -MT     Rolling Right Cavalier (Bed Mobility) maximum assist (25% patient effort)  -MT     Supine-Sit Cavalier (Bed Mobility) maximum assist (25% patient effort)  -MT     Sit-Supine Cavalier (Bed Mobility) maximum assist (25% patient effort)  -MT     Bed Mobility, Safety Issues decreased use of arms for pushing/pulling;decreased use of legs for bridging/pushing;impaired trunk control for bed mobility;cognitive deficits limit understanding  -MT     Assistive Device (Bed Mobility) bed rails;head of bed elevated;draw sheet  -MT     Comment, (Bed Mobility) glide pad utilized, pt alert and eyes open at time of attempt but needing cues to remain on task and alert while seated EOB. Pt balance ranged Mod-maxA with posterior and L lean. Attempted self feeding but pt unable to complete ADLs in sitting d/t her decreased overall balance and cognition/alert level. Pt c/o L wrist pain multiple times during OT tx. Rolled multiple times for glide pad adjustment MaxA. Placed on R side and pillows on bony prominences to decrease risk of skin breakdown  -MT       Row Name 07/18/23 0916          Self-Feeding Assessment/Training    Comment, (Feeding) attempt EOB but pt not alert enough, fear of choking if attempted so task ended  -MT               User Key  (r) = Recorded By, (t) = Taken By, (c) = Cosigned By      Initials Name Provider Type    Deborah Garces COTA Occupational Therapist Assistant                   Obj/Interventions    No documentation.                  Goals/Plan    No documentation.                   Clinical Impression       Row Name 07/18/23 0916          Pain Scale: FACES Pre/Post-Treatment    Pain: FACES Scale, Pretreatment 0-->no hurt  -MT     Posttreatment Pain Rating 8-->hurts whole lot  yells out with movement of L wrist  -MT       Row Name 07/18/23 0916          Therapy Plan Review/Discharge Plan (OT)    Anticipated Discharge Disposition (OT) inpatient rehabilitation facility  -MT       Row Name 07/18/23 0916          Positioning and Restraints    Pre-Treatment Position in bed  -MT     Post Treatment Position bed  -MT     In Bed side lying right;call light within reach;encouraged to call for assist;exit alarm on;side rails up x2;RUE elevated;LUE elevated  -MT               User Key  (r) = Recorded By, (t) = Taken By, (c) = Cosigned By      Initials Name Provider Type    Deborah Garces COTA Occupational Therapist Assistant                   Outcome Measures       Row Name 07/18/23 0916          How much help from another is currently needed...    Putting on and taking off regular lower body clothing? 2  -MT     Bathing (including washing, rinsing, and drying) 2  -MT     Toileting (which includes using toilet bed pan or urinal) 2  -MT     Putting on and taking off regular upper body clothing 2  -MT     Taking care of personal grooming (such as brushing teeth) 2  -MT     Eating meals 2  -MT     AM-PAC 6 Clicks Score (OT) 12  -MT       Row Name 07/18/23 0849          How much help from another person do you currently need...    Turning from your back to your side while in flat bed without using bedrails? 2  -CB     Moving from lying on back to sitting on the side of a flat bed without bedrails? 1  -CB     Moving to and from a bed to a chair (including a wheelchair)? 1  -CB     Standing up from a chair using your arms (e.g., wheelchair, bedside chair)? 1  -CB     Climbing 3-5 steps with a railing? 1  -CB     To walk in hospital room? 1  -CB     AM-PAC 6 Clicks Score (PT) 7  -CB     Highest  level of mobility 2 --> Bed activities/dependent transfer  -CB               User Key  (r) = Recorded By, (t) = Taken By, (c) = Cosigned By      Initials Name Provider Type    Renetta Willams LPN Licensed Nurse    Deborah Garces COTA Occupational Therapist Assistant                    Occupational Therapy Education       Title: PT OT SLP Therapies (In Progress)       Topic: Occupational Therapy (In Progress)       Point: ADL training (In Progress)       Description:   Instruct learner(s) on proper safety adaptation and remediation techniques during self care or transfers.   Instruct in proper use of assistive devices.                  Learning Progress Summary             Patient Nonacceptance, D, NL by MB at 7/16/2023 1651    Nonacceptance, E,TB, NL by MB at 7/15/2023 1739    Acceptance, E,TB,D, VU,DU,NR by  at 7/14/2023 0745    Comment: Educated on toiletting skills and bedpan use.     by  at 7/13/2023 0922    Acceptance, E,D,TB, NR by  at 7/13/2023 0830    Acceptance, E, NR by  at 7/12/2023 1116   Family Acceptance, E,D, VU by MB at 7/17/2023 1748                         Point: Home exercise program (In Progress)       Description:   Instruct learner(s) on appropriate technique for monitoring, assisting and/or progressing therapeutic exercises/activities.                  Learning Progress Summary             Patient Nonacceptance, D, NL by MB at 7/16/2023 1651    Nonacceptance, E,TB, NL by MB at 7/15/2023 1739   Family Acceptance, E,D, VU by MB at 7/17/2023 1748                         Point: Precautions (In Progress)       Description:   Instruct learner(s) on prescribed precautions during self-care and functional transfers.                  Learning Progress Summary             Patient Nonacceptance, D, NL by MB at 7/16/2023 1651    Nonacceptance, E,TB, NL by MB at 7/15/2023 1739    Acceptance, E, NR by  at 7/12/2023 1116   Family Acceptance, E,D, VU by MB at 7/17/2023 1748                          Point: Body mechanics (In Progress)       Description:   Instruct learner(s) on proper positioning and spine alignment during self-care, functional mobility activities and/or exercises.                  Learning Progress Summary             Patient Nonacceptance, D, NL by MB at 7/16/2023 1651    Nonacceptance, E,TB, NL by MB at 7/15/2023 1739   Family Acceptance, E,D, VU by MB at 7/17/2023 1748                                         User Key       Initials Effective Dates Name Provider Type Discipline     07/11/23 -  Caren Colón OTR/L, CSRS Occupational Therapist OT    MB 10/14/22 -  Nina Jacobs, RN Registered Nurse Nurse     05/03/23 -  Carmen Hernandez OT Student OT Student OT                  OT Recommendation and Plan     Plan of Care Review  Plan of Care Reviewed With: patient  Progress: improving  Outcome Evaluation: MaxA bed mobility glide pad utilized, pt alert and eyes open at time of attempt but needing cues to remain on task and alert while seated EOB. Pt balance ranged Mod-maxA with posterior and L lean. Attempted self feeding but pt unable to complete ADLs in sitting d/t her decreased overall balance and cognition/alert level. Pt c/o L wrist pain multiple times during OT tx, NSG notified and already aware. Rolled multiple times for glide pad adjustment MaxA. Placed on R side and pillows on bony prominences to decrease risk of skin breakdown. Recommend continued OT services at this time     Time Calculation:         Time Calculation- OT       Row Name 07/18/23 0916             Time Calculation- OT    OT Start Time 0916  -MT      OT Stop Time 0956  -MT      OT Time Calculation (min) 40 min  -MT      Total Timed Code Minutes- OT 40 minute(s)  -MT      OT Received On 07/18/23  -MT         Timed Charges    63381 - OT Therapeutic Activity Minutes 30  -MT      21731 - OT Self Care/Mgmt Minutes 10  -MT         Total Minutes    Timed Charges Total Minutes 40  -MT       Total Minutes  40  -MT                User Key  (r) = Recorded By, (t) = Taken By, (c) = Cosigned By      Initials Name Provider Type    Deborah Garces COTA Occupational Therapist Assistant                  Therapy Charges for Today       Code Description Service Date Service Provider Modifiers Qty    [id number]  OT THERAPEUTIC ACT EA 15 MIN 7/18/2023 Deborah Mireles COTA GO 2    [id number]  OT SELF CARE/MGMT/TRAIN EA 15 MIN 7/18/2023 Deborah Mireles COTA GO 1                 CAMILLA Jimenez  7/18/2023

## 2023-07-18 NOTE — PROGRESS NOTES
[facility] Medicine Services  INPATIENT PROGRESS NOTE    Patient Name: Sherlyn Gutierrez  Date of Admission: 7/12/2023  Today's Date: 07/18/23  Length of Stay: 6  Primary Care Physician: Ilia Serra MD    Subjective   Chief Complaint: CVA/carotid stenosis/dementia     HPI   Tmax 99.7.  T-current 98.8.  Blood pressure stable.  Patient is more sedated this morning secondary to Seroquel, we will cut Seroquel in half.  Patient is arousable.  Patient is currently on room air.    Review of Systems   Constitutional:  Positive for activity change, appetite change and fatigue. Negative for chills and fever.   HENT:  Negative for hearing loss, nosebleeds, tinnitus and trouble swallowing.    Eyes:  Negative for visual disturbance.   Respiratory:  Negative for cough, chest tightness, shortness of breath and wheezing.    Cardiovascular:  Negative for chest pain, palpitations and leg swelling.   Gastrointestinal:  Negative for abdominal distention, abdominal pain, blood in stool, constipation, diarrhea, nausea and vomiting.   Endocrine: Negative for cold intolerance, heat intolerance, polydipsia, polyphagia and polyuria.   Genitourinary:  Negative for decreased urine volume, difficulty urinating, dysuria, flank pain, frequency and hematuria.   Musculoskeletal:  Positive for arthralgias, gait problem and myalgias. Negative for joint swelling.        Left-sided weakness   Skin:  Negative for rash.   Allergic/Immunologic: Negative for immunocompromised state.   Neurological:  Positive for weakness. Negative for dizziness, syncope, light-headedness and headaches.   Hematological:  Negative for adenopathy. Does not bruise/bleed easily.   Psychiatric/Behavioral:  Negative for confusion and sleep disturbance. The patient is not nervous/anxious.     All pertinent negatives and positives are as above. All other systems have been reviewed and are negative unless otherwise stated.     Objective     Temp:  [97.6 °F (36.4 °C)-99.7 °F (37.6 °C)] 98.8 °F (37.1 °C)  Heart Rate:  [68-85] 79  Resp:  [15-16] 16  BP: (128-155)/(53-79) 136/55  Physical Exam  Constitutional:       Comments: Advanced age.  Cachectic.   HENT:      Head: Normocephalic.   Eyes:      Conjunctiva/sclera: Conjunctivae normal.      Pupils: Pupils are equal, round, and reactive to light.   Neck:      Vascular: No JVD.   Cardiovascular:      Rate and Rhythm: Normal rate and regular rhythm.      Heart sounds: Normal heart sounds.   Pulmonary:      Effort: No respiratory distress.      Breath sounds: No wheezing or rales.      Comments: Diminished breath sound bilateral, clear, on room air.  Chest:      Chest wall: No tenderness.   Abdominal:      General: Bowel sounds are normal. There is no distension.      Palpations: Abdomen is soft.      Tenderness: There is no abdominal tenderness.   Musculoskeletal:         General: No tenderness or deformity.      Cervical back: Neck supple.   Skin:     General: Skin is warm and dry.      Findings: No rash.   Neurological:      Mental Status: She is alert.      Cranial Nerves: No cranial nerve deficit.      Motor: Weakness present. No abnormal muscle tone.      Coordination: Coordination abnormal.      Gait: Gait abnormal.      Deep Tendon Reflexes: Reflexes normal.      Comments: Left-sided weakness/paralysis  Psychiatric:         Mood and Affect: Mood normal.         Behavior: Behavior normal.       Results Review:  I have reviewed the labs, radiology results, and diagnostic studies.    Laboratory Data:   Results from last 7 days   Lab Units 07/17/23  0017 07/16/23  0553 07/13/23  0418   WBC 10*3/mm3 10.79 10.44 11.65*   HEMOGLOBIN g/dL 13.2 13.2 12.7   HEMATOCRIT % 40.9 41.4 39.7   PLATELETS 10*3/mm3 213 218 215        Results from last 7 days   Lab Units 07/17/23  0017 07/16/23  0553 07/13/23  0418 07/12/23  0811 07/12/23  0052   SODIUM mmol/L 135* 142 140   < > 141   POTASSIUM mmol/L 3.7 3.6 3.8   < >  3.0*   CHLORIDE mmol/L 100 104 105   < > 105   CO2 mmol/L 21.0* 21.0* 22.0   < > 24.0   BUN mg/dL 28* 27* 11   < > 7*   CREATININE mg/dL 0.85 0.91 0.84   < > 0.77   CALCIUM mg/dL 8.6 9.3 9.2   < > 9.2   BILIRUBIN mg/dL  --  0.5 0.9  --  0.6   ALK PHOS U/L  --  114 109  --  108   ALT (SGPT) U/L  --  10 9  --  8   AST (SGOT) U/L  --  17 23  --  14   GLUCOSE mg/dL 139* 158* 188*   < > 209*    < > = values in this interval not displayed.       Culture Data:   No results found for: BLOODCX, URINECX, WOUNDCX, MRSACX, RESPCX, STOOLCX    Radiology Data:   Imaging Results (Last 24 Hours)       ** No results found for the last 24 hours. **            I have reviewed the patient's current medications.     Assessment/Plan   Assessment  Active Hospital Problems    Diagnosis     **Hypertensive urgency     Syncope and collapse     Type 2 myocardial infarction due to hypertension     History of coronary artery bypass graft     Hyperlipidemia     Hypokalemia     Hyperglycemia        Treatment Plan  Stroke/carotid stenosis.  Vascular consult.  Neurology consult.  MRI of the head- Findings of acute nonhemorrhagic ischemia of the medial superior  right mid to posterior frontal lobe within the right MCA distribution, No intracranial mass or abnormal intracranial enhancement, Moderate chronic small vessel ischemic change.  CTA of the neck-Densely calcified plaque of the proximal internal carotid arteries resulting in 70% stenosis of the proximal right ICA and 60% stenosis proximal left ICA, hypoplastic right vertebral artery, Small caliber basilar artery system, No aneurysm or dissection.  Outpatient follow-up with Dr. Quiñonez.      Hypertension/CAD/hyperlipidemia.  Aspirin.  Plavix.  Lipitor.  Lisinopril. Toprol.  Echocardiogram-ejection fraction 56%, moderate to severe septal asymmetric hypertrophy, nonobstructive hypertrophic cardiomyopathy, diastolic dysfunction grade 1, tricuspid regurgitation, saline tests-normal.      Dementia/agitation/anxiety/insomnia. Atarax as needed.  Melatonin at night.  Decrease Seroquel at night.     Reflux.  Maalox.  Zofran as needed.     Diarrhea.  Patient is out of the window for GI panel.  Imodium as needed.  Diarrhea resolved.     Diabetes.  Hemoglobin A1C 7.0.     Advanced age.  78 years old.     Nutrition . regular/house/cardiac diet.     Deconditioning.  PT and OT consult.     Plan for rehab placement.  Plan for [facility].     Medical Decision Making  Number and Complexity of problems: Stroke/carotid stenosis/hypertension  Differential Diagnosis: None     Conditions and Status        Condition is unchanged.     [facility] Data  External documents reviewed: Previous note .  Cardiac tracing (EKG, telemetry) interpretation: Sinus .  Radiology interpretation: None.  Labs reviewed: Laboratory .  Any tests that were considered but not ordered: Lab in AM.     Decision rules/scores evaluated (example [id number], [place], etc): None.     Discussed with: Patient.     Care Planning  Shared decision making: Patient .  Code status and discussions: Full code.     Disposition  Social Determinants of Health that impact treatment or disposition: Plan for rehab placement  Pending rehab placement.       Electronically signed by Osbaldo Lynch MD, 07/18/23, 10:01 CDT.

## 2023-07-18 NOTE — THERAPY TREATMENT NOTE
Acute Care - Physical Therapy Treatment Note  [facility]     Patient Name: Sherlyn Gutierrez  : 1945  MRN: [id number]  Today's Date: 2023      Visit Dx:     ICD-10-CM ICD-9-CM   1. Syncope and collapse  R55 780.2   2. Dysphagia, unspecified type  R13.10 787.20   3. Decreased independence with activities of daily living [Z78.9 (ICD-10-CM)]  Z78.9 V49.89   4. Impaired mobility [Z74.09 (ICD-10-CM)]  Z74.09 799.89   5. Cognitive and behavioral changes  R41.89 799.59    R46.89 312.9     Patient Active Problem List   Diagnosis    Syncope and collapse    Hypertensive urgency    Type 2 myocardial infarction due to hypertension    History of coronary artery bypass graft    Hyperlipidemia    Hypokalemia    Hyperglycemia     Past Medical History:   Diagnosis Date    Hyperlipidemia     Hypertension      Past Surgical History:   Procedure Laterality Date    RI RPR ANOM CORONARY ARTERY PULM ART ORIGIN GRAFT       PT Assessment (last 12 hours)       PT Evaluation and Treatment       Row Name 23 1415          Physical Therapy Time and Intention    Subjective Information complains of;pain  -KJ     Document Type therapy note (daily note)  -KJ     Mode of Treatment physical therapy  -KJ     Patient Effort fair  -KJ       Row Name 23 1415          General Information    Existing Precautions/Restrictions fall  -KJ       Row Name 23 1415          Pain Scale: FACES Pre/Post-Treatment    Pain: FACES Scale, Pretreatment 6-->hurts even more  -KJ     Posttreatment Pain Rating 6-->hurts even more  -KJ     Pain Location - Side/Orientation Left  -KJ     Pain Location upper  -KJ     Pain Location - extremity  -KJ       Row Name 23 1415          Bed Mobility    Supine-Sit Drew (Bed Mobility) maximum assist (25% patient effort)  -KJ     Sit-Supine Drew (Bed Mobility) maximum assist (25% patient effort)  -KJ     Bed Mobility, Safety Issues decreased use of arms for pushing/pulling;decreased use of legs  for bridging/pushing  -KJ       Row Name 07/18/23 1415          Balance    Balance Assessment sitting static balance  -KJ     Static Sitting Balance verbal cues;minimal assist;moderate assist  -KJ     Dynamic Sitting Balance maximum assist  -KJ     Position, Sitting Balance sitting edge of bed  -KJ     Comment, Balance pt c/o pn LUE and urethral area. Sit edge of bed x 15 minutes. Pt kept head down, but able to look up with commands. Did not follow many commands, but answered questions mostly appropriate. States she was hot, hurting, and wanted to lay down.  -KJ       Row Name 07/18/23 1415          Motor Skills    Comments, Therapeutic Exercise PROM LLLAM, MASONOM RLE  -KJ       Row Name 07/18/23 1415          Positioning and Restraints    Pre-Treatment Position in bed  -KJ     Post Treatment Position bed  -KJ     In Bed side lying left;call light within reach;exit alarm on  -KJ               User Key  (r) = Recorded By, (t) = Taken By, (c) = Cosigned By      Initials Name Provider Type    Cora Handy, OSMAN Physical Therapist Assistant                    Physical Therapy Education       Title: PT OT SLP Therapies (In Progress)       Topic: Physical Therapy (In Progress)       Point: Mobility training (In Progress)       Learning Progress Summary             Patient Nonacceptance, D, NL by MB at 7/16/2023 1651    Nonacceptance, E,TB, NL by MB at 7/15/2023 1739    Acceptance, E, NR by OKSANA at 7/13/2023 0854    Comment: bed mobility   Family Acceptance, E,D, VU by MB at 7/17/2023 1748                         Point: Home exercise program (In Progress)       Learning Progress Summary             Patient Nonacceptance, D, NL by MB at 7/16/2023 1651    Nonacceptance, E,TB, NL by MB at 7/15/2023 1739   Family Acceptance, E,D, VU by MB at 7/17/2023 1748                         Point: Body mechanics (In Progress)       Learning Progress Summary             Patient Nonacceptance, D, NL by MB at 7/16/2023 1651     "Nonacceptance, E,TB, NL by MB at 7/15/2023 1739   Family Acceptance, E,D, VU by MB at 7/17/2023 1748                         Point: Precautions (In Progress)       Learning Progress Summary             Patient Nonacceptance, D, NL by MB at 7/16/2023 1651    Nonacceptance, E,TB, NL by MB at 7/15/2023 1739    Acceptance, E, VU by  at 7/12/2023 1102    Comment: Educated pt. on not getting out of bed and needing assitance.   Family Acceptance, E,D, VU by MB at 7/17/2023 1748                                         User Key       Initials Effective Dates Name Provider Type Discipline     02/03/23 -  Bessy Haq PTA Physical Therapist Assistant PT    MB 10/14/22 -  Nina Jacobs, RN Registered Nurse Nurse     05/25/23 -  Laure Ornelas, PT Student PT Student PT                  PT Recommendation and Plan     Plan of Care Reviewed With: patient  Progress: improving  Outcome Evaluation: PT tx completed. Pt continues to c/o LUE and \"female parts\". Constant feeling and urge to urinate. Alert and answered most questions, although, did not follow many commands for treatment. Bed mobility MaxA, sit edge of bed x 20 minutes Roya. Cues to attempt upright static posture. PROM LLE, AA/PROM LUE, AROM RLE. Positioned pt on L side minimizing pressure to coccyx due to reddness. Barrier cream and mepilex applied to reddened area. Recommend SNF   Outcome Measures       Row Name 07/18/23 1500 07/17/23 1500 07/17/23 0850       How much help from another person do you currently need...    Turning from your back to your side while in flat bed without using bedrails? 1  -KJ -- 2  -LY    Moving from lying on back to sitting on the side of a flat bed without bedrails? 2  -KJ -- 1  -LY    Moving to and from a bed to a chair (including a wheelchair)? 1  -KJ -- 1  -LY    Standing up from a chair using your arms (e.g., wheelchair, bedside chair)? 1  -KJ -- 1  -LY    Climbing 3-5 steps with a railing? 1  -KJ -- 1  -LY    To walk in " hospital room? 1  -KJ -- 1  -LY    AM-PAC 6 Clicks Score (PT) 7  -KJ -- 7  -LY       How much help from another is currently needed...    Putting on and taking off regular lower body clothing? -- 2  -TS --    Bathing (including washing, rinsing, and drying) -- 2  -TS --    Toileting (which includes using toilet bed pan or urinal) -- 2  -TS --    Putting on and taking off regular upper body clothing -- 2  -TS --    Taking care of personal grooming (such as brushing teeth) -- 2  -TS --    Eating meals -- 2  -TS --    AM-PAC 6 Clicks Score (OT) -- 12  -TS --       Functional Assessment    Outcome Measure Options AM-PAC 6 Clicks Basic Mobility (PT)  -KJ -- AM-PAC 6 Clicks Basic Mobility (PT)  -LY      Row Name 07/16/23 1500             How much help from another person do you currently need...    Turning from your back to your side while in flat bed without using bedrails? 3  -KJ      Moving from lying on back to sitting on the side of a flat bed without bedrails? 2  -KJ      Moving to and from a bed to a chair (including a wheelchair)? 1  -KJ      Standing up from a chair using your arms (e.g., wheelchair, bedside chair)? 1  -KJ      Climbing 3-5 steps with a railing? 1  -KJ      To walk in hospital room? 1  -KJ      AM-PAC 6 Clicks Score (PT) 9  -KJ         Functional Assessment    Outcome Measure Options AM-PAC 6 Clicks Basic Mobility (PT)  -KJ                User Key  (r) = Recorded By, (t) = Taken By, (c) = Cosigned By      Initials Name Provider Type    Cora Handy, PTA Physical Therapist Assistant    Vivienne Ramírez COTA Occupational Therapist Assistant    Tavia Hill, PTA Physical Therapist Assistant                     Time Calculation:    PT Charges       Row Name 07/18/23 1517             Time Calculation    Start Time 1415  -KJ      Stop Time 1500  -KJ      Time Calculation (min) 45 min  -KJ      PT Received On 07/18/23  -KJ      PT Goal Re-Cert Due Date 07/22/23  -KJ         Time  Calculation- PT    Total Timed Code Minutes- PT 45 minute(s)  -YUE                User Key  (r) = Recorded By, (t) = Taken By, (c) = Cosigned By      Initials Name Provider Type    Cora Handy PTA Physical Therapist Assistant                  Therapy Charges for Today       Code Description Service Date Service Provider Modifiers Qty    43014285819 HC PT THER PROC EA 15 MIN 7/18/2023 Cora Arredondo PTA GP 1    05520738323 HC PT THERAPEUTIC ACT EA 15 MIN 7/18/2023 Cora Arredondo PTA GP 2            PT G-Codes  Outcome Measure Options: AM-PAC 6 Clicks Basic Mobility (PT)  AM-PAC 6 Clicks Score (PT): 7  AM-PAC 6 Clicks Score (OT): 12  Modified Martin Scale: 5 - Severe disability.  Bedridden, incontinent, and requiring constant nursing care and attention.    Cora Arredondo PTA  7/18/2023

## 2023-07-18 NOTE — PROGRESS NOTES
"    [facility] Palliative Care Services  Progress Note  Patient Name: Sherlyn Gutierrez  Date of Admission: 7/12/2023  Today's Date: 07/18/23     Code Status and Medical Interventions:   Ordered at: 07/17/23 1617     Medical Intervention Limits:    NO intubation (DNI)    NO cardioversion     Level Of Support Discussed With:    Health Care Surrogate     Code Status (Patient has no pulse and is not breathing):    No CPR (Do Not Attempt to Resuscitate)     Medical Interventions (Patient has pulse or is breathing):    Limited Support     Subjective   Chief complaint/Reason for Referral/Visit: Follow up on Goals of Care/Advance Care Planning and Comfort Care.    Medical record reviewed. Events noted.  No labs collected this morning.  She is lying in bed, awake and in no apparent distress.  Daughter at bedside.  Reports pain in her \"bottom\" however daughter states she just took some pain medication not long ago.  Appears more weak and less interactive today.  PT arrived during exam and plans to perform therapy.       Advance Care Planning   Advanced Directives: Patient has an advance directive on file. Patient unable to confirm or deny if valid at this time secondary to dementia.    Advance Care Planning Discussion: Followed up with Mrs. Gutierrez and her daughter, Gila, who is also one of her healthcare surrogates.  Mrs. Gutierrez continues to have difficulty demonstrating ability to make complex medical decisions at time of exam.  Gila shared she feels her mother has continued to decline throughout the last several days.  Reports she was able to visit some of the nursing facilities in [place] and wishes to make referrals to [facility] and [facility].  SW notified via secure chat.  Shared she is hopeful her mother will be able to participate in therapy some so she can avoid further complications such as wounds, contractures, etc.  Also remains realistic and shared she is aware she may not be able to participate in therapy " long-term and at that point would wish to ensure her mother is comfortable.  We discussed medical priorities further and she confirmed her mother would not wish to have feeding tube/artificial nutrition if she declined and were unable to tolerate PO intake.  CODE STATUS changed to reflect this.  We also discussed MOST form including details and indications.  Expressed interest in completing.  Have initiated to reflect wishes of NO CPR with limited interventions.  Requested attending to sign for completion.  Once completed will fax copies to HIM to place in EMR and provide family with copies.  Gila denies any questions and/or concerns at this time.  Support provided.     The patient receives support from her spouse, children, and extended family. Patient's children are her healthcare surrogate.    Due to the palliative care topics discussed including goals of care, treatment options, discharge options, and medical priorities we will establish an advance care plan.    Goals of care: Ongoing.    Review of Systems   Unable to perform ROS: Mental status change     Pain Assessment  CPOT and PAINAD Scales: PAINAD (Pain Assessment in Advance Dementia Scale)  CPOT Facial Expression: 0-->relaxed, neutral  CPOT Body Movements: 0-->absence of movements  CPOT Muscle Tension: 0-->relaxed  Ventilator Compliance/Vocalization: 0-->talking in normal tone or no sound  CPOT Score: 0  PAINAD Breathin-->normal  PAINAD Negative Vocalization: 0-->none  PAINAD Facial Expression: 0-->smiling or inexpressive  PAINAD Body Language: 0-->relaxed  PAINAD Consolability: 0-->no need to console  PAINAD Score: 0  Pain Location: buttock  Pain Description: aching  Objective   Diagnostics: Reviewed    No intake or output data in the 24 hours ending 23 1521  Current Facility-Administered Medications   Medication Dose Route Frequency Provider Last Rate Last Admin    acetaminophen (TYLENOL) tablet 650 mg  650 mg Oral Q4H PRN Igor Luevano DO    650 mg at 07/18/23 1313    Or    acetaminophen (TYLENOL) 160 MG/5ML solution 650 mg  650 mg Oral Q4H PRN Igor Luevano DO        aluminum-magnesium hydroxide-simethicone (MAALOX MAX) 400-400-40 MG/5ML suspension 7.5 mL  7.5 mL Oral Q4H PRN Tay Ernandez MD        aspirin EC tablet 81 mg  81 mg Oral Daily Igor Luevano DO   81 mg at 07/18/23 0853    atorvastatin (LIPITOR) tablet 80 mg  80 mg Oral Nightly Tay Ernandez MD   80 mg at 07/17/23 2130    bisacodyl (DULCOLAX) suppository 10 mg  10 mg Rectal Daily PRN Tay Ernandez MD        clopidogrel (PLAVIX) tablet 75 mg  75 mg Oral Daily Tay Ernandez MD   75 mg at 07/18/23 0853    hydrOXYzine (ATARAX) tablet 25 mg  25 mg Oral Q6H PRN Osbaldo Lynch MD   25 mg at 07/16/23 1449    lisinopril (PRINIVIL,ZESTRIL) tablet 10 mg  10 mg Oral Q24H Osbaldo Lynch MD   10 mg at 07/18/23 0852    loperamide (IMODIUM) capsule 4 mg  4 mg Oral 4x Daily PRN Osbaldo Lynch MD   4 mg at 07/17/23 2131    melatonin tablet 5 mg  5 mg Oral Nightly PRN Igor Luevano DO   5 mg at 07/14/23 2116    metoprolol succinate XL (TOPROL-XL) 24 hr tablet 12.5 mg  12.5 mg Oral Q24H Osbaldo Lynch MD   12.5 mg at 07/18/23 0852    ondansetron (ZOFRAN) injection 4 mg  4 mg Intravenous Q6H PRN Igor Luevano DO        QUEtiapine (SEROquel) tablet 12.5 mg  12.5 mg Oral Nightly Osbaldo Lynch MD        sodium chloride 0.9 % flush 10 mL  10 mL Intravenous PRN Jose Luis Vang DO        sodium chloride 0.9 % flush 10 mL  10 mL Intravenous PRN Igor Luevano,         sodium chloride 0.9 % flush 10 mL  10 mL Intravenous Q12H Tay Ernandez MD   10 mL at 07/18/23 0853    sodium chloride 0.9 % flush 10 mL  10 mL Intravenous PRN Tay Ernandez MD        sodium chloride 0.9 % infusion 40 mL  40 mL Intravenous PRN Igor Luevano,         sodium chloride 0.9 % infusion 40 mL  40 mL Intravenous PRN Tay Ernandez MD              acetaminophen  "**OR** acetaminophen **OR** [DISCONTINUED] acetaminophen    aluminum-magnesium hydroxide-simethicone    bisacodyl    hydrOXYzine    loperamide    melatonin    ondansetron    sodium chloride    sodium chloride    sodium chloride    sodium chloride    sodium chloride  Current medications patient is presently taking including all prescriptions, over-the-counter, herbals and vitamin/mineral/dietary (nutritional) supplements with reviewed including route, type, dose and frequency and are current per MAR at time of dictation.    Assessment:  Vital Signs: /54 (BP Location: Left arm, Patient Position: Lying)   Pulse 76   Temp 98.3 °F (36.8 °C) (Oral)   Resp 16   Ht 167.6 cm (66\")   Wt 56.7 kg (125 lb)   SpO2 96%   BMI 20.18 kg/m²     Physical Exam  Vitals and nursing note reviewed.   Constitutional:       General: She is awake. She is not in acute distress.     Appearance: She is ill-appearing.   HENT:      Head: Normocephalic and atraumatic.   Eyes:      General: Lids are normal.      Extraocular Movements: Extraocular movements intact.   Neck:      Vascular: No JVD.      Trachea: Trachea normal.   Cardiovascular:      Rate and Rhythm: Normal rate.   Pulmonary:      Effort: Pulmonary effort is normal.   Musculoskeletal:      Cervical back: Neck supple.   Skin:     General: Skin is warm and dry.   Neurological:      Mental Status: She is alert. She is disoriented.   Psychiatric:         Mood and Affect: Mood is anxious.         Cognition and Memory: Cognition is impaired.        Functional status: Palliative Performance Scale Score: Performance 40% based on the following measures: Ambulation: Mainly in bed, Activity and Evidence of Disease: Unable to do any work, extensive evidence of disease, Self-Care: Mainly assistance required,  Intake: Normal or reduced, LOC: Full, drowsy or confusion.  Nutritional status: Albumin 4.3. Body mass index is 20.18 kg/m².   Patient status: Disease state: Controlled with current " treatments.    Impression/Problem List:  Cerebrovascular accident, right MCA  Syncope and collapse   Carotid stenosis, bilateral (R>L)   Impaired mobility   Decreased independence with activities of daily living   Hypertensive urgency   Left sided weakness/neglect  Diastolic dysfunction per echocardiogram   Hyperlipidemia   Type 2 myocardial infarction due to hypertension      Dysphagia         Plan / Recommendations     Palliative Care Encounter   Goals of care include CODE STATUS NO CPR with limited support interventions.    Prognosis is poor long-term secondary to MCA CVA, carotid artery stenosis, impaired mobility and decreased independence with ADLs,  hypertensive urgency and other comorbidities listed above.      Mrs. Gutierrez continues to have difficulty demonstrating ability to make complex medical decisions.  Spoke with her daughter who is also one of her HCS at bedside.  Details of discussion above.     Family has requested referrals be sent to [facility] and [facility] [place], operated by [facility].  SW aware and referrals have been sent.   Plans to continue therapy as long as able to participate/tolerate.  If becomes unable anticipate transition to hospice services.   Gila appears to have good prognostic awareness.     Explored medical priorities further.  Family expressed she would not wish to receive artifical nutrition if indicated.  CODE STATUS changed to reflect this.    We also discussed MOST form including details and indications.  Expressed interest in completing.    Have initiated to reflect wishes of NO CPR with limited interventions.  Requested attending to sign for completion.    Once completed will fax copies to HIM to place in EMR and provide family with copies.      Gila denies any questions and/or concerns at this time.  Support provided.     Thank you for allowing us to participate in patient's plan of care. Palliative Care Team will continue to follow patient.     Time spent:60 minutes spent reviewing medical and  medication records, assessing and examining patient, discussing with family, answering questions, providing some guidance about a plan and documentation of care, and coordinating care with other healthcare members, with > 50% time spent face to face.       Electronically signed by, FLORA Driscoll, 07/18/23.

## 2023-07-18 NOTE — PLAN OF CARE
Goal Outcome Evaluation:  Plan of Care Reviewed With: patient        Progress: no change     Pt alert and oriented x3 , minus situation. VSS. Pt c/o coccyx pain. PRN medications given with some relief of symptoms. PPP. Tele, NSR. SCDs  for VTE. , room air. Tolerating prescribed diet. Skin intact. Voiding via purewick. Bedrest. Turn and reposition Q2 hrs. Last BM 7/17. Bed alarm set.  Call light within reach. Safety maintained.

## 2023-07-19 LAB
ANION GAP SERPL CALCULATED.3IONS-SCNC: 14 MMOL/L (ref 5–15)
BUN SERPL-MCNC: 52 MG/DL (ref 8–23)
BUN/CREAT SERPL: 58.4 (ref 7–25)
CALCIUM SPEC-SCNC: 8.7 MG/DL (ref 8.6–10.5)
CHLORIDE SERPL-SCNC: 102 MMOL/L (ref 98–107)
CO2 SERPL-SCNC: 21 MMOL/L (ref 22–29)
CREAT SERPL-MCNC: 0.89 MG/DL (ref 0.57–1)
DEPRECATED RDW RBC AUTO: 46.5 FL (ref 37–54)
EGFRCR SERPLBLD CKD-EPI 2021: 66.5 ML/MIN/1.73
ERYTHROCYTE [DISTWIDTH] IN BLOOD BY AUTOMATED COUNT: 12.8 % (ref 12.3–15.4)
GLUCOSE SERPL-MCNC: 123 MG/DL (ref 65–99)
HCT VFR BLD AUTO: 44.8 % (ref 34–46.6)
HGB BLD-MCNC: 13.6 G/DL (ref 12–15.9)
MCH RBC QN AUTO: 30 PG (ref 26.6–33)
MCHC RBC AUTO-ENTMCNC: 30.4 G/DL (ref 31.5–35.7)
MCV RBC AUTO: 98.9 FL (ref 79–97)
PLATELET # BLD AUTO: 225 10*3/MM3 (ref 140–450)
PMV BLD AUTO: 11.1 FL (ref 6–12)
POTASSIUM SERPL-SCNC: 4.3 MMOL/L (ref 3.5–5.2)
RBC # BLD AUTO: 4.53 10*6/MM3 (ref 3.77–5.28)
SODIUM SERPL-SCNC: 137 MMOL/L (ref 136–145)
WBC NRBC COR # BLD: 9.27 10*3/MM3 (ref 3.4–10.8)

## 2023-07-19 PROCEDURE — 97530 THERAPEUTIC ACTIVITIES: CPT

## 2023-07-19 PROCEDURE — 80048 BASIC METABOLIC PNL TOTAL CA: CPT | Performed by: FAMILY MEDICINE

## 2023-07-19 PROCEDURE — 85027 COMPLETE CBC AUTOMATED: CPT | Performed by: FAMILY MEDICINE

## 2023-07-19 PROCEDURE — 99232 SBSQ HOSP IP/OBS MODERATE 35: CPT

## 2023-07-19 PROCEDURE — 97110 THERAPEUTIC EXERCISES: CPT

## 2023-07-19 PROCEDURE — 97535 SELF CARE MNGMENT TRAINING: CPT

## 2023-07-19 RX ADMIN — Medication 10 ML: at 08:36

## 2023-07-19 RX ADMIN — LISINOPRIL 10 MG: 10 TABLET ORAL at 08:39

## 2023-07-19 RX ADMIN — QUETIAPINE FUMARATE 12.5 MG: 25 TABLET, FILM COATED ORAL at 20:13

## 2023-07-19 RX ADMIN — TRAMADOL HYDROCHLORIDE 25 MG: 50 TABLET, COATED ORAL at 13:44

## 2023-07-19 RX ADMIN — METOPROLOL SUCCINATE 12.5 MG: 25 TABLET, EXTENDED RELEASE ORAL at 08:36

## 2023-07-19 RX ADMIN — ATORVASTATIN CALCIUM 80 MG: 40 TABLET ORAL at 20:14

## 2023-07-19 RX ADMIN — CLOPIDOGREL BISULFATE 75 MG: 75 TABLET, FILM COATED ORAL at 08:36

## 2023-07-19 RX ADMIN — BUSPIRONE HYDROCHLORIDE 15 MG: 5 TABLET ORAL at 20:14

## 2023-07-19 RX ADMIN — Medication 10 ML: at 20:14

## 2023-07-19 RX ADMIN — ASPIRIN 81 MG: 81 TABLET, COATED ORAL at 08:36

## 2023-07-19 NOTE — PLAN OF CARE
Goal Outcome Evaluation:  Plan of Care Reviewed With: patient        Progress: no change   Pt alert and oriented to person and place. VSS. Pt c/o pain.PRN medication given with some relief of symptoms.  JANSEN. PPP. Asa for VTE. , room air. Tolerating prescribed diet. Skin intact. Voiding via pure wick. Bedrest. Turn and reposition? Q2 hrs. Last BM 7/17. Bed alarm set. Call light within reach. Safety maintained.

## 2023-07-19 NOTE — THERAPY TREATMENT NOTE
Acute Care - Physical Therapy Treatment Note  [facility]     Patient Name: Sherlyn Gutierrez  : 1945  MRN: [id number]  Today's Date: 2023      Visit Dx:     ICD-10-CM ICD-9-CM   1. Syncope and collapse  R55 780.2   2. Dysphagia, unspecified type  R13.10 787.20   3. Decreased independence with activities of daily living [Z78.9 (ICD-10-CM)]  Z78.9 V49.89   4. Impaired mobility [Z74.09 (ICD-10-CM)]  Z74.09 799.89   5. Cognitive and behavioral changes  R41.89 799.59    R46.89 312.9     Patient Active Problem List   Diagnosis    Syncope and collapse    Hypertensive urgency    Type 2 myocardial infarction due to hypertension    History of coronary artery bypass graft    Hyperlipidemia    Hypokalemia    Hyperglycemia     Past Medical History:   Diagnosis Date    Hyperlipidemia     Hypertension      Past Surgical History:   Procedure Laterality Date    [place] RPR ANOM CORONARY ARTERY PULM ART ORIGIN GRAFT       PT Assessment (last 12 hours)       PT Evaluation and Treatment       Row Name 23 1315          Physical Therapy Time and Intention    Subjective Information complains of;pain  -KJ     Document Type therapy note (daily note)  -KJ     Mode of Treatment physical therapy  -KJ     Patient Effort fair  -KJ     Comment Constantly states she has to pee, she hurts LUE, she wants her phone. She does not stay focused long enough to complete a task  -KJ       Row Name 23 1315          General Information    Existing Precautions/Restrictions fall  -KJ       Row Name 23 1315          Pain Scale: FACES Pre/Post-Treatment    Pain: FACES Scale, Pretreatment 0-->no hurt  -KJ     Posttreatment Pain Rating 6-->hurts even more  -KJ     Pain Location - Side/Orientation Left  -KJ     Pain Location upper  -KJ     Pain Location - extremity  -KJ       Row Name 23 1315          Bed Mobility    Rolling Right [place] (Bed Mobility) minimum assist (75% patient effort)  -KJ     Supine-Sit [place] (Bed  Mobility) maximum assist (25% patient effort)  -KJ     Sit-Supine [place] (Bed Mobility) maximum assist (25% patient effort);verbal cues  -KJ     Assistive Device (Bed Mobility) draw sheet  -KJ       Row Name 07/19/23 1315          Balance    Balance Assessment sitting static balance  -KJ     Static Sitting Balance verbal cues;minimal assist;moderate assist  -KJ     Dynamic Sitting Balance maximum assist  -KJ     Comment, Balance PT sat edge of bed x 20 minutes Min/ModA. Pt has posterior L lean. Flaccid LLE, wiggles toes slightly. Able to squeeze L hand.  -KJ       Row Name 07/19/23 1315          Positioning and Restraints    Pre-Treatment Position in bed  -KJ     Post Treatment Position bed  -KJ     In Bed call light within reach  -KJ               User Key  (r) = Recorded By, (t) = Taken By, (c) = Cosigned By      Initials Name Provider Type    Cora Handy, OSMAN Physical Therapist Assistant                    Physical Therapy Education       Title: PT OT SLP Therapies (In Progress)       Topic: Physical Therapy (In Progress)       Point: Mobility training (In Progress)       Learning Progress Summary             Patient Nonacceptance, D, NL by MB at 7/16/2023 1651    Nonacceptance, E,TB, NL by MB at 7/15/2023 1739    Acceptance, E, NR by OKSANA at 7/13/2023 0854    Comment: bed mobility   Family Acceptance, E,D, VU by MB at 7/17/2023 1748                         Point: Home exercise program (In Progress)       Learning Progress Summary             Patient Nonacceptance, D, NL by MB at 7/16/2023 1651    Nonacceptance, E,TB, NL by MB at 7/15/2023 1739   Family Acceptance, E,D, VU by MB at 7/17/2023 1748                         Point: Body mechanics (In Progress)       Learning Progress Summary             Patient Nonacceptance, D, NL by MB at 7/16/2023 1651    Nonacceptance, E,TB, NL by MB at 7/15/2023 1739   Family Acceptance, E,D, VU by MB at 7/17/2023 1748                         Point: Precautions (In  "Progress)       Learning Progress Summary             Patient Nonacceptance, D, NL by MB at 7/16/2023 1651    Nonacceptance, E,TB, NL by MB at 7/15/2023 1739    Acceptance, E, VU by  at 7/12/2023 1102    Comment: Educated pt. on not getting out of bed and needing assitance.   Family Acceptance, E,D, VU by MB at 7/17/2023 1748                                         User Key       Initials Effective Dates Name Provider Type Discipline     02/03/23 -  Bessy Haq PTA Physical Therapist Assistant PT    MB 10/14/22 -  Nina Jacobs, RN Registered Nurse Nurse     05/25/23 -  Laure Ornelas, SANDRA Student PT Student PT                  PT Recommendation and Plan     Plan of Care Reviewed With: patient  Progress: improving  Outcome Evaluation: PT tx completed. Pt supine in bed, alert. C/O pn \"everywhere\",more LUE and private region. States she has to pee constantly. Pt has increased difficulty staying on task or following commands due to inability to stay focused. Able to move RLE actively, LLE PROM, able to squeeze L hand. No movement above wrist noted. Sat edge of bed x 20 minutes Min/ModA. Recommend SNF.   Outcome Measures       Row Name 07/19/23 1400 07/18/23 1500 07/17/23 1500       How much help from another person do you currently need...    Turning from your back to your side while in flat bed without using bedrails? 2  -KJ 1  -KJ --    Moving from lying on back to sitting on the side of a flat bed without bedrails? 2  -KJ 2  -KJ --    Moving to and from a bed to a chair (including a wheelchair)? 1  -KJ 1  -KJ --    Standing up from a chair using your arms (e.g., wheelchair, bedside chair)? 1  -KJ 1  -KJ --    Climbing 3-5 steps with a railing? 1  -KJ 1  -KJ --    To walk in hospital room? 1  -KJ 1  -KJ --    AM-PAC 6 Clicks Score (PT) 8  -KJ 7  -KJ --       How much help from another is currently needed...    Putting on and taking off regular lower body clothing? -- -- 2  -TS    Bathing (including washing, " rinsing, and drying) -- -- 2  -TS    Toileting (which includes using toilet bed pan or urinal) -- -- 2  -TS    Putting on and taking off regular upper body clothing -- -- 2  -TS    Taking care of personal grooming (such as brushing teeth) -- -- 2  -TS    Eating meals -- -- 2  -TS    AM-PAC 6 Clicks Score (OT) -- -- 12  -TS       Functional Assessment    Outcome Measure Options AM-PAC 6 Clicks Basic Mobility (PT)  -KJ AM-PAC 6 Clicks Basic Mobility (PT)  -KJ --      Row Name 07/17/23 0850 07/16/23 1500          How much help from another person do you currently need...    Turning from your back to your side while in flat bed without using bedrails? 2  -LY 3  -KJ     Moving from lying on back to sitting on the side of a flat bed without bedrails? 1  -LY 2  -KJ     Moving to and from a bed to a chair (including a wheelchair)? 1  -LY 1  -KJ     Standing up from a chair using your arms (e.g., wheelchair, bedside chair)? 1  -LY 1  -KJ     Climbing 3-5 steps with a railing? 1  -LY 1  -KJ     To walk in hospital room? 1  -LY 1  -KJ     AM-PAC 6 Clicks Score (PT) 7  -LY 9  -KJ        Functional Assessment    Outcome Measure Options AM-PAC 6 Clicks Basic Mobility (PT)  -LY AM-PAC 6 Clicks Basic Mobility (PT)  -KJ               User Key  (r) = Recorded By, (t) = Taken By, (c) = Cosigned By      Initials Name Provider Type    Cora Handy, PTA Physical Therapist Assistant    Vivienne Ramírez COTA Occupational Therapist Assistant    Tavia Hill, PTA Physical Therapist Assistant                     Time Calculation:    PT Charges       Row Name 07/19/23 1420             Time Calculation    Start Time 1315  -KJ      Stop Time 1353  -KJ      Time Calculation (min) 38 min  -KJ      PT Received On 07/19/23  -KJ      PT Goal Re-Cert Due Date 07/22/23  -KJ         Time Calculation- PT    Total Timed Code Minutes- PT 38 minute(s)  -KJ                User Key  (r) = Recorded By, (t) = Taken By, (c) = Cosigned By       Initials Name Provider Type    YUE Cora Arredondo, OSMAN Physical Therapist Assistant                  Therapy Charges for Today       Code Description Service Date Service Provider Modifiers Qty    98793433058 HC PT THER PROC EA 15 MIN 7/18/2023 Cora Arredondo, PTA GP 1    92879451301 HC PT THERAPEUTIC ACT EA 15 MIN 7/18/2023 Cora Arredondo, PTA GP 2    75248119040 HC PT THER PROC EA 15 MIN 7/19/2023 Cora Arredondo, PTA GP 1    01546179806 HC PT THERAPEUTIC ACT EA 15 MIN 7/19/2023 Cora Arredondo, PTA GP 2            PT G-Codes  Outcome Measure Options: AM-PAC 6 Clicks Basic Mobility (PT)  AM-PAC 6 Clicks Score (PT): 8  AM-PAC 6 Clicks Score (OT): 12  Modified [place] Scale: 5 - Severe disability.  Bedridden, incontinent, and requiring constant nursing care and attention.    Cora Arredondo PTA  7/19/2023

## 2023-07-19 NOTE — THERAPY TREATMENT NOTE
"Acute Care - Occupational Therapy Treatment Note  [facility]     Patient Name: Sherlyn Gutierrez  : 1945  MRN: [id number]  Today's Date: 2023             Admit Date: 2023       ICD-10-CM ICD-9-CM   1. Syncope and collapse  R55 780.2   2. Dysphagia, unspecified type  R13.10 787.20   3. Decreased independence with activities of daily living [Z78.9 (ICD-10-CM)]  Z78.9 V49.89   4. Impaired mobility [Z74.09 (ICD-10-CM)]  Z74.09 799.89   5. Cognitive and behavioral changes  R41.89 799.59    R46.89 312.9     Patient Active Problem List   Diagnosis    Syncope and collapse    Hypertensive urgency    Type 2 myocardial infarction due to hypertension    History of coronary artery bypass graft    Hyperlipidemia    Hypokalemia    Hyperglycemia     Past Medical History:   Diagnosis Date    Hyperlipidemia     Hypertension      Past Surgical History:   Procedure Laterality Date    [place] RPR ANOM CORONARY ARTERY PULM ART ORIGIN GRAFT           OT ASSESSMENT FLOWSHEET (last 12 hours)       OT Evaluation and Treatment       Row Name 23 1340                   OT Time and Intention    Subjective Information complains of;pain  -TS        Document Type therapy note (daily note)  -TS        Mode of Treatment occupational therapy  -TS        Patient Effort fair  -TS        Comment constantly focuses on her \"need\" to pee  -TS           General Information    Existing Precautions/Restrictions fall  -TS           Pain Assessment    Pretreatment Pain Rating 3/10  -TS        Posttreatment Pain Rating 3/10  -TS        Pain Location - Side/Orientation Left  -TS        Pain Location - hand  -TS        Pain Intervention(s) Repositioned  -TS           Cognition    Orientation Status (Cognition) oriented to;person;place  -TS        Personal Safety Interventions fall prevention program maintained;gait belt;nonskid shoes/slippers when out of bed  -TS           Activities of Daily Living    BADL Assessment/Intervention toileting  -TS        " "   Toileting Assessment/Training    [place] Level (Toileting) perform perineal hygiene;change pad/brief;maximum assist (25% patient effort);adjust/manage clothing  -TS        Position (Toileting) supine  -TS        Comment, (Toileting) attempted to use bedpan per pt request because she \"has to pee\" but pt unable to produce any urine.  -TS           Bed Mobility    Rolling Left [place] (Bed Mobility) moderate assist (50% patient effort)  -TS        Rolling Right [place] (Bed Mobility) moderate assist (50% patient effort)  -TS        Scooting/Bridging [place] (Bed Mobility) moderate assist (50% patient effort)  -TS        Assistive Device (Bed Mobility) bed rails;draw sheet  -TS           Plan of Care Review    Plan of Care Reviewed With patient  -TS        Progress improving  -TS        Outcome Evaluation Pt demonstrates increased alertness, was able to participate more in bed mobility. Pt still remains very focused on having to urinate. Continue OT POC  -TS           Positioning and Restraints    Pre-Treatment Position in bed  -TS        Post Treatment Position bed  -TS        In Bed fowlers;call light within reach;encouraged to call for assist;side rails up x3  -TS                  User Key  (r) = Recorded By, (t) = Taken By, (c) = Cosigned By      Initials Name Effective Dates    TS Vivienne Robledo, SAMPSON 02/03/23 -                      Occupational Therapy Education       Title: PT OT SLP Therapies (In Progress)       Topic: Occupational Therapy (In Progress)       Point: ADL training (In Progress)       Description:   Instruct learner(s) on proper safety adaptation and remediation techniques during self care or transfers.   Instruct in proper use of assistive devices.                  Learning Progress Summary             Patient Nonacceptance, D, NL by MB at 7/16/2023 1651    Nonacceptance, E,TB, NL by MB at 7/15/2023 1739    Acceptance, E,TB,D, VU,DU,NR by  at 7/14/2023 0745    " Comment: Educated on toiletting skills and bedpan use.     by  at 7/13/2023 0922    Acceptance, E,D,TB, NR by  at 7/13/2023 0830    Acceptance, E, NR by J at 7/12/2023 1116   Family Acceptance, E,D, VU by MB at 7/17/2023 1748                         Point: Home exercise program (In Progress)       Description:   Instruct learner(s) on appropriate technique for monitoring, assisting and/or progressing therapeutic exercises/activities.                  Learning Progress Summary             Patient Nonacceptance, D, NL by MB at 7/16/2023 1651    Nonacceptance, E,TB, NL by MB at 7/15/2023 1739   Family Acceptance, E,D, VU by MB at 7/17/2023 1748                         Point: Precautions (In Progress)       Description:   Instruct learner(s) on prescribed precautions during self-care and functional transfers.                  Learning Progress Summary             Patient Nonacceptance, D, NL by MB at 7/16/2023 1651    Nonacceptance, E,TB, NL by MB at 7/15/2023 1739    Acceptance, E, NR by JIRWIN at 7/12/2023 1116   Family Acceptance, E,D, VU by MB at 7/17/2023 1748                         Point: Body mechanics (In Progress)       Description:   Instruct learner(s) on proper positioning and spine alignment during self-care, functional mobility activities and/or exercises.                  Learning Progress Summary             Patient Nonacceptance, D, NL by MB at 7/16/2023 1651    Nonacceptance, E,TB, NL by MB at 7/15/2023 1739   Family Acceptance, E,D, VU by MB at 7/17/2023 1748                                         User Key       Initials Effective Dates Name Provider Type Discipline     07/11/23 -  Caren Colón OTR/L, CSRS Occupational Therapist OT    MB 10/14/22 -  Nina Jacobs, RN Registered Nurse Nurse     05/03/23 -  Carmen Hernandez OT Student OT Student OT                      OT Recommendation and Plan     Plan of Care Review  Plan of Care Reviewed With: patient  Progress: improving  Outcome  Evaluation: Pt demonstrates increased alertness, was able to participate more in bed mobility. Pt still remains very focused on having to urinate. Continue OT POC  Plan of Care Reviewed With: patient  Outcome Evaluation: Pt demonstrates increased alertness, was able to participate more in bed mobility. Pt still remains very focused on having to urinate. Continue OT POC     Outcome Measures       Row Name 07/19/23 1500 07/19/23 1400 07/18/23 1500       How much help from another person do you currently need...    Turning from your back to your side while in flat bed without using bedrails? -- 2  -KJ 1  -KJ    Moving from lying on back to sitting on the side of a flat bed without bedrails? -- 2  -KJ 2  -KJ    Moving to and from a bed to a chair (including a wheelchair)? -- 1  -KJ 1  -KJ    Standing up from a chair using your arms (e.g., wheelchair, bedside chair)? -- 1  -KJ 1  -KJ    Climbing 3-5 steps with a railing? -- 1  -KJ 1  -KJ    To walk in hospital room? -- 1  -KJ 1  -KJ    AM-PAC 6 Clicks Score (PT) -- 8  -KJ 7  -KJ       How much help from another is currently needed...    Putting on and taking off regular lower body clothing? 2  -TS -- --    Bathing (including washing, rinsing, and drying) 2  -TS -- --    Toileting (which includes using toilet bed pan or urinal) 2  -TS -- --    Putting on and taking off regular upper body clothing 2  -TS -- --    Taking care of personal grooming (such as brushing teeth) 2  -TS -- --    Eating meals 3  -TS -- --    AM-PAC 6 Clicks Score (OT) 13  -TS -- --       Functional Assessment    Outcome Measure Options -- AM-PAC 6 Clicks Basic Mobility (PT)  -KJ AM-PAC 6 Clicks Basic Mobility (PT)  -KJ      Row Name 07/17/23 1500 07/17/23 0850          How much help from another person do you currently need...    Turning from your back to your side while in flat bed without using bedrails? -- 2  -LY     Moving from lying on back to sitting on the side of a flat bed without bedrails?  -- 1  -LY     Moving to and from a bed to a chair (including a wheelchair)? -- 1  -LY     Standing up from a chair using your arms (e.g., wheelchair, bedside chair)? -- 1  -LY     Climbing 3-5 steps with a railing? -- 1  -LY     To walk in hospital room? -- 1  -LY     AM-PAC 6 Clicks Score (PT) -- 7  -LY        How much help from another is currently needed...    Putting on and taking off regular lower body clothing? 2  -TS --     Bathing (including washing, rinsing, and drying) 2  -TS --     Toileting (which includes using toilet bed pan or urinal) 2  -TS --     Putting on and taking off regular upper body clothing 2  -TS --     Taking care of personal grooming (such as brushing teeth) 2  -TS --     Eating meals 2  -TS --     AM-PAC 6 Clicks Score (OT) 12  -TS --        Functional Assessment    Outcome Measure Options -- AM-PAC 6 Clicks Basic Mobility (PT)  -LY               User Key  (r) = Recorded By, (t) = Taken By, (c) = Cosigned By      Initials Name Provider Type    Cora Handy, OSMAN Physical Therapist Assistant    Vivienne Ramírez COTA Occupational Therapist Assistant    Tavia Hill, OSMAN Physical Therapist Assistant                    Time Calculation:    Time Calculation- OT       Row Name 07/19/23 1516             Time Calculation- OT    OT Start Time 1340  -TS      OT Stop Time 1405  -TS      OT Time Calculation (min) 25 min  -TS      Total Timed Code Minutes- OT 25 minute(s)  -TS      OT Received On 07/19/23  -TS         Timed Charges    78199 - OT Self Care/Mgmt Minutes 25  -TS         Total Minutes    Timed Charges Total Minutes 25  -TS       Total Minutes 25  -TS                User Key  (r) = Recorded By, (t) = Taken By, (c) = Cosigned By      Initials Name Provider Type    Vivienne Ramírez COTA Occupational Therapist Assistant                  Therapy Charges for Today       Code Description Service Date Service Provider Modifiers Qty    35414849036  OT SELF  CARE/MGMT/TRAIN EA 15 MIN 7/19/2023 Vivienne Robledo, SAMPSON GO 2                 Vivienne BRISENO. CAMILLA Robledo  7/19/2023

## 2023-07-19 NOTE — PROGRESS NOTES
"    [facility] Palliative Care Services  Progress Note  Patient Name: Sherlyn Gutierrez  Date of Admission: 7/12/2023  Today's Date: 07/19/23     Code Status and Medical Interventions:   Ordered at: 07/18/23 1522     Medical Intervention Limits:    NO intubation (DNI)    NO cardioversion    NO artificial nutrition     Level Of Support Discussed With:    Health Care Surrogate     Code Status (Patient has no pulse and is not breathing):    No CPR (Do Not Attempt to Resuscitate)     Medical Interventions (Patient has pulse or is breathing):    Limited Support     Subjective   Chief complaint/Reason for Referral/Visit: Follow up on Goals of Care/Advance Care Planning and Comfort Care.    Medical record reviewed. Events noted.  Has been accepted to [facility] with plans to discharge tomorrow.  She is lying in bed, alert and in no apparent distress at time of exam.  States \"I have to pee.\"  Explained she had purewick in place however states \"I just feel bad I have that.\"  Have asked nursing to bladder scan to determine if she is having any urinary retention. Support provided.  She shared she also wanted her \"little black phone.\"  Did not see at bedside and explained would ask her daughter if she had taken it home.  No visitors currently present.     MOST form signed by attending yesterday.  Copies faxed to medical records and also placed in patient's room with her belongings.     Advanced Directives: Patient has an advance directive on file. Patient unable to confirm or deny if valid at this time secondary to dementia.      The patient receives support from her spouse, children, and extended family. Patient's children are her healthcare surrogate.    Due to the palliative care topics discussed including goals of care, treatment options, discharge options, and medical priorities we will establish an advance care plan.    Goals of care: Ongoing.    Review of Systems   Unable to perform ROS: Mental status change "     Pain Assessment  CPOT and PAINAD Scales: PAINAD (Pain Assessment in Advance Dementia Scale)  CPOT Facial Expression: 0-->relaxed, neutral  CPOT Body Movements: 0-->absence of movements  CPOT Muscle Tension: 0-->relaxed  Ventilator Compliance/Vocalization: 0-->talking in normal tone or no sound  CPOT Score: 0  PAINAD Breathin-->normal  PAINAD Negative Vocalization: 0-->none  PAINAD Facial Expression: 0-->smiling or inexpressive  PAINAD Body Language: 0-->relaxed  PAINAD Consolability: 0-->no need to console  PAINAD Score: 0  Pain Location: extremity  Pain Description: aching  Objective   Diagnostics: Reviewed      Intake/Output Summary (Last 24 hours) at 2023 1215  Last data filed at 2023 0749  Gross per 24 hour   Intake 120 ml   Output 800 ml   Net -680 ml     Current Facility-Administered Medications   Medication Dose Route Frequency Provider Last Rate Last Admin    aluminum-magnesium hydroxide-simethicone (MAALOX MAX) 400-400-40 MG/5ML suspension 7.5 mL  7.5 mL Oral Q4H PRN Tay Ernandez MD        aspirin EC tablet 81 mg  81 mg Oral Daily Igor Luevano DO   81 mg at 23 0836    atorvastatin (LIPITOR) tablet 80 mg  80 mg Oral Nightly Tay Ernandez MD   80 mg at 23 2121    bisacodyl (DULCOLAX) suppository 10 mg  10 mg Rectal Daily PRN Tay Ernandez MD        clopidogrel (PLAVIX) tablet 75 mg  75 mg Oral Daily Tay Ernandez MD   75 mg at 23 0836    hydrOXYzine (ATARAX) tablet 25 mg  25 mg Oral Q6H PRN Osbaldo Lynch MD   25 mg at 23 1449    lisinopril (PRINIVIL,ZESTRIL) tablet 10 mg  10 mg Oral Q24H Osbaldo Lynch MD   10 mg at 23 0839    loperamide (IMODIUM) capsule 4 mg  4 mg Oral 4x Daily PRN Osbaldo Lynch MD   4 mg at 07/17/23 2131    melatonin tablet 5 mg  5 mg Oral Nightly PRN Igor Luevano DO   5 mg at 23    metoprolol succinate XL (TOPROL-XL) 24 hr tablet 12.5 mg  12.5 mg Oral Q24H Osbaldo Lynch MD   12.5 mg at  "07/19/23 0836    ondansetron (ZOFRAN) injection 4 mg  4 mg Intravenous Q6H PRN Igor Luevano DO        QUEtiapine (SEROquel) tablet 12.5 mg  12.5 mg Oral Nightly Osbaldo Lynch MD   12.5 mg at 07/18/23 2122    sodium chloride 0.9 % flush 10 mL  10 mL Intravenous Q12H Tay Ernandez MD   10 mL at 07/19/23 0836    sodium chloride 0.9 % flush 10 mL  10 mL Intravenous PRN Tay Ernandez MD        sodium chloride 0.9 % infusion 40 mL  40 mL Intravenous PRN Tay Ernandez MD        traMADol (ULTRAM) tablet 25 mg  25 mg Oral Q6H PRN Osbaldo Lynch MD   25 mg at 07/18/23 2121          aluminum-magnesium hydroxide-simethicone    bisacodyl    hydrOXYzine    loperamide    melatonin    ondansetron    sodium chloride    sodium chloride    traMADol  Current medications patient is presently taking including all prescriptions, over-the-counter, herbals and vitamin/mineral/dietary (nutritional) supplements with reviewed including route, type, dose and frequency and are current per MAR at time of dictation.    Assessment:  Vital Signs: /57 (BP Location: Left arm, Patient Position: Lying)   Pulse 70   Temp 98.4 °F (36.9 °C) (Oral)   Resp 16   Ht 167.6 cm (66\")   Wt 56.7 kg (125 lb)   SpO2 98%   BMI 20.18 kg/m²     Physical Exam  Vitals and nursing note reviewed.   Constitutional:       General: She is awake. She is not in acute distress.     Appearance: She is ill-appearing.   HENT:      Head: Normocephalic and atraumatic.   Eyes:      General: Lids are normal.      Extraocular Movements: Extraocular movements intact.   Neck:      Vascular: No JVD.      Trachea: Trachea normal.   Cardiovascular:      Rate and Rhythm: Normal rate.   Pulmonary:      Effort: Pulmonary effort is normal.   Musculoskeletal:      Cervical back: Neck supple.   Skin:     General: Skin is warm and dry.   Neurological:      Mental Status: She is disoriented.   Psychiatric:         Mood and Affect: Mood is anxious.         " Cognition and Memory: Cognition is impaired.        Functional status: Palliative Performance Scale Score: Performance 40% based on the following measures: Ambulation: Mainly in bed, Activity and Evidence of Disease: Unable to do any work, extensive evidence of disease, Self-Care: Mainly assistance required,  Intake: Normal or reduced, LOC: Full, drowsy or confusion.  Nutritional status: Albumin 4.3. Body mass index is 20.18 kg/m².   Patient status: Disease state: Controlled with current treatments.    Impression/Problem List:  Cerebrovascular accident, right MCA  Syncope and collapse   Carotid stenosis, bilateral (R>L)   Impaired mobility   Decreased independence with activities of daily living   Hypertensive urgency   Left sided weakness/neglect  Diastolic dysfunction per echocardiogram   Hyperlipidemia   Type 2 myocardial infarction due to hypertension      Dysphagia         Plan / Recommendations     Palliative Care Encounter   Goals of care include CODE STATUS NO CPR with limited support interventions.    Prognosis is poor long-term secondary to MCA CVA, carotid artery stenosis, impaired mobility and decreased independence with ADLs,  hypertensive urgency and other comorbidities listed above.      Mrs. Gutierrez continues to have difficulty demonstrating ability to make complex medical decisions.      Continues to experience urge to urinate despite explaining she has purewick in place.    Have asked nursing to bladder scan her to determine if she is experiencing any urinary retention.     Call placed to her daughter, Gila, however unable to reach.  Voicemail left.      MOST form signed by attending.  Copies faxed to HIM to place in EMR.  Copies also placed in patient's room and daughter notified.      Anticipate discharge to [facility] tomorrow.     Thank you for allowing us to participate in patient's plan of care. Palliative Care Team will continue to follow patient.     Time spent:35 minutes spent reviewing  medical and medication records, assessing and examining patient, discussing with family, answering questions, providing some guidance about a plan and documentation of care, and coordinating care with other healthcare members, with > 50% time spent face to face.     Electronically signed by, FLORA Driscoll, 07/19/23.

## 2023-07-19 NOTE — PLAN OF CARE
Goal Outcome Evaluation:  Plan of Care Reviewed With: patient        Progress: no change  Outcome Evaluation: alert, disoriented to time and situation, NIH 9, venous dopler to left arm last night, no results yet, voiding per purewick, incontinent bowel, turning, bed alarm set, safety maintained

## 2023-07-19 NOTE — PROGRESS NOTES
[facility] Medicine Services  INPATIENT PROGRESS NOTE    Patient Name: Sherlyn Gutierrez  Date of Admission: 7/12/2023  Today's Date: 07/19/23  Length of Stay: 7  Primary Care Physician: Ilia Serra MD    Subjective   Chief Complaint: CVA/carotid stenosis/dementia    HPI   Patient is afebrile, blood pressure stable.  Patient is on aspirin and Plavix at this time.  Patient is worry what is going to happen.  Patient currently on room air.  Plan to go to [facility] rehab tomorrow discussed with patient.    Review of Systems   Constitutional:  Positive for activity change, appetite change and fatigue. Negative for chills and fever.   HENT:  Negative for hearing loss, nosebleeds, tinnitus and trouble swallowing.    Eyes:  Negative for visual disturbance.   Respiratory:  Negative for cough, chest tightness, shortness of breath and wheezing.    Cardiovascular:  Negative for chest pain, palpitations and leg swelling.   Gastrointestinal:  Negative for abdominal distention, abdominal pain, blood in stool, constipation, diarrhea, nausea and vomiting.   Endocrine: Negative for cold intolerance, heat intolerance, polydipsia, polyphagia and polyuria.   Genitourinary:  Negative for decreased urine volume, difficulty urinating, dysuria, flank pain, frequency and hematuria.   Musculoskeletal:  Positive for arthralgias, gait problem and myalgias. Negative for joint swelling.        Left-sided weakness   Skin:  Negative for rash.   Allergic/Immunologic: Negative for immunocompromised state.   Neurological:  Positive for weakness. Negative for dizziness, syncope, light-headedness and headaches.   Hematological:  Negative for adenopathy. Does not bruise/bleed easily.   Psychiatric/Behavioral: Positive anxious.  All pertinent negatives and positives are as above. All other systems have been reviewed and are negative unless otherwise stated.     Objective    Temp:  [98 °F (36.7 °C)-98.4 °F (36.9 °C)]  98.4 °F (36.9 °C)  Heart Rate:  [70-79] 70  Resp:  [14-16] 16  BP: (117-158)/(54-83) 117/57  Physical Exam  Constitutional:       Comments: Advanced age.  Cachectic.   HENT:      Head: Normocephalic.   Eyes:      Conjunctiva/sclera: Conjunctivae normal.      Pupils: Pupils are equal, round, and reactive to light.   Neck:      Vascular: No JVD.   Cardiovascular:      Rate and Rhythm: Normal rate and regular rhythm.      Heart sounds: Normal heart sounds.   Pulmonary:      Effort: No respiratory distress.      Breath sounds: No wheezing or rales.      Comments: Diminished breath sound bilateral, clear, on room air.  Chest:      Chest wall: No tenderness.   Abdominal:      General: Bowel sounds are normal. There is no distension.      Palpations: Abdomen is soft.      Tenderness: There is no abdominal tenderness.   Musculoskeletal:         General: No tenderness or deformity.      Cervical back: Neck supple.   Skin:     General: Skin is warm and dry.      Findings: No rash.   Neurological:      Mental Status: She is alert.      Cranial Nerves: No cranial nerve deficit.      Motor: Weakness present. No abnormal muscle tone.      Coordination: Coordination abnormal.      Gait: Gait abnormal.      Deep Tendon Reflexes: Reflexes normal.      Comments: Left-sided weakness/paralysis  Psychiatric:         Mood and Affect: Mood normal.         Behavior: Behavior normal.       Results Review:  I have reviewed the labs, radiology results, and diagnostic studies.    Laboratory Data:   Results from last 7 days   Lab Units 07/19/23  0518 07/17/23  0017 07/16/23  0553   WBC 10*3/mm3 9.27 10.79 10.44   HEMOGLOBIN g/dL 13.6 13.2 13.2   HEMATOCRIT % 44.8 40.9 41.4   PLATELETS 10*3/mm3 225 213 218        Results from last 7 days   Lab Units 07/19/23  0824 07/17/23  0017 07/16/23  0553 07/13/23  0418   SODIUM mmol/L 137 135* 142 140   POTASSIUM mmol/L 4.3 3.7 3.6 3.8   CHLORIDE mmol/L 102 100 104 105   CO2 mmol/L 21.0* 21.0* 21.0* 22.0    BUN mg/dL 52* 28* 27* 11   CREATININE mg/dL 0.89 0.85 0.91 0.84   CALCIUM mg/dL 8.7 8.6 9.3 9.2   BILIRUBIN mg/dL  --   --  0.5 0.9   ALK PHOS U/L  --   --  114 109   ALT (SGPT) U/L  --   --  10 9   AST (SGOT) U/L  --   --  17 23   GLUCOSE mg/dL 123* 139* 158* 188*       Culture Data:   No results found for: BLOODCX, URINECX, WOUNDCX, MRSACX, RESPCX, STOOLCX    Radiology Data:   Imaging Results (Last 24 Hours)       Procedure Component Value Units Date/Time    US Venous Doppler Upper Extremity Left (duplex) [[id number]] Resulted: 07/18/23 1858     Updated: 07/18/23 1939            I have reviewed the patient's current medications.     Assessment/Plan   Assessment  Active Hospital Problems    Diagnosis     **Hypertensive urgency     Syncope and collapse     Type 2 myocardial infarction due to hypertension     History of coronary artery bypass graft     Hyperlipidemia     Hypokalemia     Hyperglycemia        Treatment Plan  Stroke/carotid stenosis.  Vascular consult.  Neurology consult.  MRI of the head- Findings of acute nonhemorrhagic ischemia of the medial superior  right mid to posterior frontal lobe within the right MCA distribution, No intracranial mass or abnormal intracranial enhancement, Moderate chronic small vessel ischemic change.  CTA of the neck-Densely calcified plaque of the proximal internal carotid arteries resulting in 70% stenosis of the proximal right ICA and 60% stenosis proximal left ICA, hypoplastic right vertebral artery, Small caliber basilar artery system, No aneurysm or dissection.  Outpatient follow-up with Dr. Quiñonez.      Hypertension/CAD/hyperlipidemia.  Aspirin.  Plavix.  Lipitor.  Lisinopril. Toprol.  Echocardiogram-ejection fraction 56%, moderate to severe septal asymmetric hypertrophy, nonobstructive hypertrophic cardiomyopathy, diastolic dysfunction grade 1, tricuspid regurgitation, saline tests-normal.     Dementia/agitation/anxiety/insomnia. Atarax as needed.  Melatonin at  night.  Low-dose Seroquel at night.     Reflux.  Maalox.  Zofran as needed.     Diarrhea.  Patient is out of the window for GI panel.  Imodium as needed.  Diarrhea resolved.    Left arm pain.  Ultram as needed.  Doppler ultrasound left arm-preliminary-LUE  No thrombus visualized at this time      Diabetes.  Hemoglobin A1C 7.0.     Advanced age.  78 years old.     Nutrition . regular/house/cardiac diet.     Deconditioning.  PT and OT consult.     Plan for rehab placement.  [facility]     Medical Decision Making  Number and Complexity of problems: Stroke/carotid stenosis/hypertension  Differential Diagnosis: None     Conditions and Status        Condition is unchanged.     MDM Data  External documents reviewed: Previous note .  Cardiac tracing (EKG, telemetry) interpretation: Sinus .  Radiology interpretation: None.  Labs reviewed: Laboratory .  Any tests that were considered but not ordered: Lab in AM.     Decision rules/scores evaluated (example [id number], Wells, etc): None.     Discussed with: Patient.     Care Planning  Shared decision making: Patient .  Code status and discussions: Full code.     Disposition  Social Determinants of Health that impact treatment or disposition: Plan for rehab placement  Pending rehab placement.    Electronically signed by Osbaldo Lynch MD, 07/19/23, 13:45 CDT.

## 2023-07-19 NOTE — CASE MANAGEMENT/SOCIAL WORK
Continued Stay Note  [facility]     Patient Name: Sherlyn Gutierrez  MRN: [id number]  Today's Date: 7/19/2023    Admit Date: 7/12/2023    Plan: [facility]   Discharge Plan       Row Name 07/19/23 1132       Plan    Plan [facility]      Row Name 07/19/23 1130       Plan    Plan Comments PT has a bed offer at [facility] and the offer has been accepted. PT may dc to SNF Thursday 7/20/23 if medically ready. [facility] [phone]  phone [phone] fax.  SNF Pharmacy has been added in Epic ([employer]).    Final Discharge Disposition Code 03 - skilled nursing facility (SNF)                   Discharge Codes    No documentation.                 Expected Discharge Date and Time       Expected Discharge Date Expected Discharge Time    Jul 19, 2023               WOODROW Gonzalez

## 2023-07-19 NOTE — PLAN OF CARE
Goal Outcome Evaluation:  Plan of Care Reviewed With: patient        Progress: improving  Outcome Evaluation: Pt demonstrates increased alertness, was able to participate more in bed mobility. Pt still remains very focused on having to urinate. Continue OT POC

## 2023-07-19 NOTE — PLAN OF CARE
"Goal Outcome Evaluation:  Plan of Care Reviewed With: patient        Progress: improving  Outcome Evaluation: PT tx completed. Pt supine in bed, alert. C/O pn \"everywhere\",more LUE and private region. States she has to pee constantly. Pt has increased difficulty staying on task or following commands due to inability to stay focused. Able to move RLE actively, LLE PROM, able to squeeze L hand. No movement above wrist noted. Sat edge of bed x 20 minutes Min/ModA. Recommend SNF.         "

## 2023-07-20 VITALS
DIASTOLIC BLOOD PRESSURE: 55 MMHG | SYSTOLIC BLOOD PRESSURE: 127 MMHG | TEMPERATURE: 98 F | OXYGEN SATURATION: 97 % | RESPIRATION RATE: 16 BRPM | HEIGHT: 66 IN | WEIGHT: 125 LBS | BODY MASS INDEX: 20.09 KG/M2 | HEART RATE: 77 BPM

## 2023-07-20 PROCEDURE — 25010000002 ONDANSETRON PER 1 MG: Performed by: INTERNAL MEDICINE

## 2023-07-20 RX ORDER — ASPIRIN 81 MG/1
81 TABLET ORAL DAILY
Start: 2023-07-20

## 2023-07-20 RX ORDER — TRAMADOL HYDROCHLORIDE 50 MG/1
25 TABLET ORAL EVERY 6 HOURS PRN
Qty: 25 TABLET | Refills: 0 | Status: SHIPPED | OUTPATIENT
Start: 2023-07-20

## 2023-07-20 RX ORDER — METOPROLOL SUCCINATE 25 MG/1
12.5 TABLET, EXTENDED RELEASE ORAL
Start: 2023-07-20

## 2023-07-20 RX ORDER — ATORVASTATIN CALCIUM 80 MG/1
40 TABLET, FILM COATED ORAL NIGHTLY
Qty: 90 TABLET
Start: 2023-07-20

## 2023-07-20 RX ORDER — QUETIAPINE FUMARATE 25 MG/1
12.5 TABLET, FILM COATED ORAL NIGHTLY
Start: 2023-07-20

## 2023-07-20 RX ORDER — TRAMADOL HYDROCHLORIDE 50 MG/1
25 TABLET ORAL EVERY 6 HOURS PRN
Qty: 24 TABLET | Refills: 0 | Status: SHIPPED | OUTPATIENT
Start: 2023-07-20 | End: 2023-07-20 | Stop reason: SDUPTHER

## 2023-07-20 RX ORDER — NALOXONE HYDROCHLORIDE 4 MG/.1ML
SPRAY NASAL
Qty: 2 EACH | Refills: 0 | Status: SHIPPED | OUTPATIENT
Start: 2023-07-20

## 2023-07-20 RX ORDER — BUSPIRONE HYDROCHLORIDE 15 MG/1
15 TABLET ORAL 3 TIMES DAILY
Start: 2023-07-20

## 2023-07-20 RX ORDER — CLOPIDOGREL BISULFATE 75 MG/1
75 TABLET ORAL DAILY
Qty: 30 TABLET
Start: 2023-07-20

## 2023-07-20 RX ORDER — CHOLECALCIFEROL (VITAMIN D3) 125 MCG
5 CAPSULE ORAL NIGHTLY PRN
Start: 2023-07-20

## 2023-07-20 RX ADMIN — TRAMADOL HYDROCHLORIDE 25 MG: 50 TABLET, COATED ORAL at 03:08

## 2023-07-20 RX ADMIN — METOPROLOL SUCCINATE 12.5 MG: 25 TABLET, EXTENDED RELEASE ORAL at 09:38

## 2023-07-20 RX ADMIN — ONDANSETRON 4 MG: 2 INJECTION INTRAMUSCULAR; INTRAVENOUS at 09:36

## 2023-07-20 RX ADMIN — LISINOPRIL 10 MG: 10 TABLET ORAL at 09:38

## 2023-07-20 RX ADMIN — BUSPIRONE HYDROCHLORIDE 15 MG: 5 TABLET ORAL at 15:59

## 2023-07-20 RX ADMIN — BUSPIRONE HYDROCHLORIDE 15 MG: 5 TABLET ORAL at 09:38

## 2023-07-20 RX ADMIN — TRAMADOL HYDROCHLORIDE 25 MG: 50 TABLET, COATED ORAL at 11:56

## 2023-07-20 RX ADMIN — BISACODYL 10 MG: 10 SUPPOSITORY RECTAL at 11:48

## 2023-07-20 RX ADMIN — CLOPIDOGREL BISULFATE 75 MG: 75 TABLET, FILM COATED ORAL at 09:38

## 2023-07-20 RX ADMIN — Medication 10 ML: at 09:36

## 2023-07-20 RX ADMIN — ASPIRIN 81 MG: 81 TABLET, COATED ORAL at 09:38

## 2023-07-20 NOTE — DISCHARGE SUMMARY
[facility]  DISCHARGE SUMMARY       Date of Admission: 7/12/2023  Date of Discharge:  7/20/2023  Primary Care Physician: Ilia Serra MD    Presenting Problem/History of Present Illness:    Final Discharge Diagnoses:  Active Hospital Problems    Diagnosis     **Hypertensive urgency     Syncope and collapse     Type 2 myocardial infarction due to hypertension     History of coronary artery bypass graft     Hyperlipidemia     Hypokalemia     Hyperglycemia        Consults: Palliative care . vascular surgery.  Neurology.    Pertinent Test Results:   Results for orders placed during the hospital encounter of 07/12/23    Adult Transthoracic Echo Complete W/ Cont if Necessary Per Protocol    Interpretation Summary    Left ventricular systolic function is normal. Left ventricular ejection fraction appears to be 56 - 60%.    Left ventricular wall thickness is consistent with moderate to severe septal asymmetric hypertrophy.    The findings are consistent with nonobstructive hypertrophic cardiomyopathy.    Left ventricular diastolic function is consistent with (grade I) impaired relaxation.    Estimated right ventricular systolic pressure from tricuspid regurgitation is normal (<35 mmHg).    Saline test is technically difficult but grossly normal      Imaging Results (All)       Procedure Component Value Units Date/Time    US Venous Doppler Upper Extremity Left (duplex) [[id number]] Collected: 07/19/23 1701     Updated: 07/19/23 1704    Narrative:      History: Pain and swelling       Impression:      Impression: There is no evidence of deep venous thrombosis or  superficial thrombophlebitis of the left upper extremity.     Comments: Left upper extremity venous duplex exam was performed using  color Doppler flow, Doppler wave form analysis, and grayscale imaging,  with and without compression. There is no evidence of deep venous  thrombosis of the internal  jugular, subclavian, axillary, brachial,  radial, and ulnar veins. There is no evidence of superficial  thrombophlebitis involving the cephalic or basilic veins.      This report was finalized on 07/19/2023 17:01 by Dr. Ranjit Quiñonez MD.    CT Angiogram Neck [[id number]] Collected: 07/12/23 1334     Updated: 07/12/23 1346    Narrative:      CT ANGIOGRAM NECK- 7/12/2023 10:53 AM CDT     HISTORY: TYLER blockage; R55-Syncope and collapse; R13.10-Dysphagia,  unspecified      COMPARISON: None     DOSE LENGTH PRODUCT: 308 mGy cm. Automated exposure control was also  utilized to decrease patient radiation dose.     TECHNIQUE: Axial images the neck are performed following IV contrast.  2-D and maximal intensity projectional reconstructed images reviewed     FINDINGS:  Median sternotomy wires. Mild calcified plaque within the  normal caliber aortic arch. Mild calcification of the proximal left  common and subclavian arteries creating no significant luminal stenosis.  Right brachiocephalic artery is patent. No flow-limiting subclavian  artery stenosis. Tortuosity of the proximal left common carotid artery.      There is moderate densely calcified plaque of the right carotid bulb  extending into the proximal right internal carotid artery resulting in  65-70% stenosis of the proximal right internal carotid artery. Mid and  distal right internal carotid artery are patent.     There is mild to moderate densely calcified plaque of the left proximal  internal carotid artery resulting in 60% stenosis.     The vertebral arteries originate from the proximal subclavian arteries  as expected. Hypoplastic right vertebral artery. Prominent tapering of  the distal bilateral feet 2 bilateral wrist with small caliber basilar  artery.     Heterogeneous thyroid with no dominant nodule. No pathologic  lymphadenopathy or suspicious soft tissue mass identified in the neck.  Moderate to advanced degenerative disc change at the C4/C5 through  C6/C7  level.     Visible lung apices are unremarkable. Median sternotomy wires.       Impression:      1. Densely calcified plaque of the proximal internal carotid arteries  resulting in 70% stenosis of the proximal right ICA and 60% stenosis  proximal left ICA.  2. Hypoplastic right vertebral artery. Small caliber basilar artery  system.  3. No aneurysm or dissection.  This report was finalized on 07/12/2023 13:42 by Dr. Caren Aguilar MD.    CT Angiogram Head [[id number]] Collected: 07/12/23 1304     Updated: 07/12/23 1313    Narrative:      CT ANGIOGRAM HEAD- 7/12/2023 10:53 AM CDT     HISTORY: RPCA /R MCA blockage; R55-Syncope and collapse;  R13.10-Dysphagia, unspecified      COMPARISON: None     DOSE LENGTH PRODUCT: 308 mGy cm. Automated exposure control was also  utilized to decrease patient radiation dose.     TECHNIQUE: Axial images the brain are obtained following IV contrast.  2-D and maximal intensity projectional reconstructed images reviewed     FINDINGS:  The distal internal carotid arteries demonstrate mild  calcification of the cavernous segment of the right internal carotid  artery resulting in 50% or less stenosis. The bilateral proximal and mid  anterior and middle cerebral arteries are patent. Decreased enhancement  of the distal branches within the anterior watershed distribution of the  right frontal region up towards the vertex. Small caliber  vertebrobasilar system. Fetal origin left posterior cerebral artery from  the anterior circulation. Small caliber proximal right posterior  cerebral artery. Patent right posterior communicating artery. No  aneurysm or dissection identified.     No abnormal enhancing intracranial lesion.       Impression:      1. No central large vessel occlusion. Decreased enhancement of the  distal branches of the right middle cerebral artery involving anterior  watershed distribution up towards the vertex. No aneurysm or dissection.  Small caliber vertebrobasilar  system. Fetal origin left posterior  cerebral artery.  This report was finalized on 07/12/2023 13:10 by Dr. Caren Aguilar MD.    MRI Brain With & Without Contrast [[id number]] Collected: 07/12/23 0650     Updated: 07/12/23 0702    Narrative:      HISTORY: Posterior reversible encephalopathy syndrome, confusion/  altered mental status     MRI BRAIN: Multiplanar imaging the brain is performed pre- and post-IV  contrast. Image quality degraded by patient motion artifact.     COMPARISON: CT head 07/12/2023     FINDINGS: There is diffusion restriction along the medial posterior  right frontal lobe superiorly representing acute nonhemorrhagic ischemia  within the anterior distribution. No intracranial blood products  identified. Hyperintense T2 FLAIR signal of the periventricular white  matter regions supporting underlying chronic small vessel ischemia. No  intracranial mass identified. No abnormal extra-axial fluid collection.  No abnormal intracranial enhancement identified.     Limited assessment of the base of skull demonstrates partial  opacification of the inferior right mastoid air cells which may be  secondary to small mastoid effusion.       Impression:      1. Findings of acute nonhemorrhagic ischemia of the medial superior  right mid to posterior frontal lobe within the right MCA distribution.  No intracranial mass or abnormal intracranial enhancement. Results are  communicated to Dr. Luevano on 07/12/2023 at 7:00 AM.  2. Moderate chronic small vessel ischemic change.  This report was finalized on 07/12/2023 06:59 by Dr. Caren Aguilar MD.    XR Chest 1 View [[id number]] Collected: 07/12/23 0631     Updated: 07/12/23 0635    Narrative:      HISTORY: Syncope     CXR: Frontal view the chest obtained.     COMPARISON: 11/12/2007     FINDINGS: Prior mediastinal surgery. Shallow inspiration with vascular  crowding and basilar atelectasis. No lobar consolidation. No pleural  effusion pneumothorax. No radiographic  evidence of edema. No acute  regional bony pathology.       Impression:      1. Shallow inspiration. Prior mediastinal surgery. Vascular crowding and  likely bibasilar atelectasis.  This report was finalized on 07/12/2023 06:32 by Dr. Carne Aguilar MD.    CT Head Without Contrast [[id number]] Collected: 07/12/23 0557     Updated: 07/12/23 0602    Narrative:      CT HEAD WO CONTRAST- 7/12/2023 1:30 AM CDT     HISTORY: syncope      COMPARISON: None     DOSE LENGTH PRODUCT: 609 mGy cm. Automated exposure control was also  utilized to decrease patient radiation dose.     TECHNIQUE: Axial images the brain obtained without contrast     FINDINGS:  There is mild age-related volume loss. There are moderate  chronic small vessel ischemic changes. No intracranial hemorrhage or  mass effect. No convincing acute signs of ischemia. No extra-axial  hematoma or subarachnoid hemorrhage.     The visible paranasal sinuses and mastoid air cells are well-aerated.  The bony calvarium appears intact.       Impression:      1. No acute intracranial abnormality.  2. Mild generalized volume loss with moderate chronic small vessel  ischemic change.     Comments: A preliminary report is issued to the ER by the [facility]  radiology service. I agree with this preliminary impression.  This report was finalized on 07/12/2023 05:59 by Dr. Caren Aguilar MD.          LAB RESULTS:      Lab 07/19/23  0518 07/17/23  0017 07/16/23  0553   WBC 9.27 10.79 10.44   HEMOGLOBIN 13.6 13.2 13.2   HEMATOCRIT 44.8 40.9 41.4   PLATELETS 225 213 218   NEUTROS ABS  --   --  8.55*   IMMATURE GRANS (ABS)  --   --  0.04   LYMPHS ABS  --   --  0.80   MONOS ABS  --   --  0.97*   EOS ABS  --   --  0.05   MCV 98.9* 94.0 94.3         Lab 07/19/23  0824 07/17/23  0017 07/16/23  0553   SODIUM 137 135* 142   POTASSIUM 4.3 3.7 3.6   CHLORIDE 102 100 104   CO2 21.0* 21.0* 21.0*   ANION GAP 14.0 14.0 17.0*   BUN 52* 28* 27*   CREATININE 0.89 0.85 0.91   EGFR 66.5 70.2 64.7    GLUCOSE 123* 139* 158*   CALCIUM 8.7 8.6 9.3         Lab 07/16/23  0553   TOTAL PROTEIN 6.6   ALBUMIN 4.3   GLOBULIN 2.3   ALT (SGPT) 10   AST (SGOT) 17   BILIRUBIN 0.5   ALK PHOS 114                     Brief Urine Lab Results  (Last result in the past 365 days)        Color   Clarity   Blood   Leuk Est   Nitrite   Protein   CREAT   Urine HCG        07/12/23 0209 Yellow   Clear   Negative   Moderate (2+)   Negative   Negative                 Microbiology Results (last 10 days)       ** No results found for the last 240 hours. **        HPI.  This is a 78-year-old  female patient who resides in [place], [place].  She sees Dr. Serra for primary care.  She presents to the emergency department with complaints of dizziness and a passing out episode earlier today.  She states that she was going out to walk her dog and she felt very dizzy.  She did not get overheated.  She got back in the house and passed out.  She decided to come to the ER for further evaluation.     She has been extremely hypertensive the entire time that she has been in the ER.  She has received multiple doses of IV labetalol and a dose of oral clonidine.  I am despite this, her blood pressure is still 180/131 at the time that I am seeing her.     Dr. Vang apparently also spoke with her  and he was concerned about her acting differently and also having a bit of a tremor.     She denies shortness of breath or chest pain.     She states that her blood pressure is never this poorly controlled.  However, she also admits that she never checks it.  She brought in 2 pill bottles with her tonight.  She takes pravastatin 40 mg daily and lisinopril 10 mg daily.     She has significant flight of ideas.  She was very focused on getting to watch the news on [employer] the entire time I was in the room.  She asked me over and over again why she needs to keep wearing a blood pressure cuff and a pulse oximeter.    Hospital Course:  "  Stroke/carotid stenosis.  Vascular consult.  Neurology consult.  MRI of the head- Findings of acute nonhemorrhagic ischemia of the medial superior  right mid to posterior frontal lobe within the right MCA distribution, No intracranial mass or abnormal intracranial enhancement, Moderate chronic small vessel ischemic change.  CTA of the neck-Densely calcified plaque of the proximal internal carotid arteries resulting in 70% stenosis of the proximal right ICA and 60% stenosis proximal left ICA, hypoplastic right vertebral artery, Small caliber basilar artery system, No aneurysm or dissection.  Outpatient follow-up with Dr. Quiñonez.      Hypertension/CAD/hyperlipidemia.  Aspirin.  Plavix.  Lipitor.  Lisinopril. Toprol.  Echocardiogram-ejection fraction 56%, moderate to severe septal asymmetric hypertrophy, nonobstructive hypertrophic cardiomyopathy, diastolic dysfunction grade 1, tricuspid regurgitation, saline tests-normal.     Dementia/agitation/anxiety/insomnia. Atarax as needed.  Melatonin at night.  Low-dose Seroquel at night.     Reflux.  Maalox.  Zofran as needed.     Diarrhea.  Patient is out of the window for GI panel.  Imodium as needed.  Diarrhea resolved.     Left arm pain.  Ultram as needed.  Doppler ultrasound left arm-preliminary-LUE  No thrombus visualized at this time      Diabetes.  Hemoglobin A1C 7.0.     Advanced age.  78 years old.     Nutrition . regular/house/cardiac diet.     Deconditioning.  PT and OT consult.     Plan for [facility] rehab today.  Vital signs stable, afebrile.  Patient be transferred by ambulance.  Follow-up with rehab physician as soon as able.  Follow with Dr. Quiñonez outpatient.    Physical Exam on Discharge:  /55 (BP Location: Right arm, Patient Position: Lying)   Pulse 77   Temp 98 °F (36.7 °C) (Oral)   Resp 16   Ht 167.6 cm (66\")   Wt 56.7 kg (125 lb)   SpO2 97%   BMI 20.18 kg/m²   Physical Exam  Constitutional:       Comments: Advanced age.  Cachectic. "   HENT:      Head: Normocephalic.   Eyes:      Conjunctiva/sclera: Conjunctivae normal.      Pupils: Pupils are equal, round, and reactive to light.   Neck:      Vascular: No JVD.   Cardiovascular:      Rate and Rhythm: Normal rate and regular rhythm.      Heart sounds: Normal heart sounds.   Pulmonary:      Effort: No respiratory distress.      Breath sounds: No wheezing or rales.      Comments: Diminished breath sound bilateral, clear, on room air.  Chest:      Chest wall: No tenderness.   Abdominal:      General: Bowel sounds are normal. There is no distension.      Palpations: Abdomen is soft.      Tenderness: There is no abdominal tenderness.   Musculoskeletal:         General: No tenderness or deformity.      Cervical back: Neck supple.   Skin:     General: Skin is warm and dry.      Findings: No rash.   Neurological:      Mental Status: She is alert.      Cranial Nerves: No cranial nerve deficit.      Motor: Weakness present. No abnormal muscle tone.      Coordination: Coordination abnormal.      Gait: Gait abnormal.      Deep Tendon Reflexes: Reflexes normal.      Comments: Left-sided weakness/paralysis  Psychiatric:         Mood and Affect: Mood normal.         Behavior: Behavior normal.     Condition on Discharge: Stable.    Discharge Disposition: Discharged to [facility] via ambulance.  Skilled Nursing Facility (DC - External)    Discharge Medications:     Discharge Medications        New Medications        Instructions Start Date   aspirin 81 MG EC tablet   81 mg, Oral, Daily      atorvastatin 80 MG tablet  Commonly known as: LIPITOR   40 mg, Oral, Nightly      busPIRone 15 MG tablet  Commonly known as: BUSPAR   15 mg, Oral, 3 Times Daily      clopidogrel 75 MG tablet  Commonly known as: PLAVIX   75 mg, Oral, Daily      melatonin 5 MG tablet tablet   5 mg, Oral, Nightly PRN      metoprolol succinate XL 25 MG 24 hr tablet  Commonly known as: TOPROL-XL   12.5 mg, Oral, Every 24 Hours Scheduled       QUEtiapine 25 MG tablet  Commonly known as: SEROquel   12.5 mg, Oral, Nightly      traMADol 50 MG tablet  Commonly known as: ULTRAM   25 mg, Oral, Every 6 Hours PRN             Continue These Medications        Instructions Start Date   lisinopril 10 MG tablet  Commonly known as: PRINIVIL,ZESTRIL   10 mg, Oral, Daily             Stop These Medications      pravastatin 40 MG tablet  Commonly known as: PRAVACHOL              Discharge Diet:   Diet Instructions       Advance Diet As Tolerated -Target Diet: Regular .      Target Diet: Regular .            Activity at Discharge:   Activity Instructions       Activity as Tolerated              Follow-up Appointments:   Follow with P follow-up with nursing physician as soon as able.  Follow-up with Dr. Quiñonez outpatient.    Electronically signed by Osbaldo Lynch MD, 07/20/23, 08:31 CDT.    Time: Greater than 30 minutes.

## 2023-07-20 NOTE — PLAN OF CARE
Problem: Adult Inpatient Plan of Care  Goal: Plan of Care Review  Outcome: Ongoing, Progressing  Flowsheets (Taken 7/20/2023 5176)  Progress: no change  Plan of Care Reviewed With: patient  Outcome Evaluation: Pt is alert. PRN pain meds given for complaining of pain. turns. purewic in use. VSS. Safety maintained.

## 2023-07-20 NOTE — PLAN OF CARE
Goal Outcome Evaluation:               Awaiting EMS to transport to [facility], report has been called, daughter at bedside. Patient c/o pain, medicated as ordered. Confusion noted, oriented to self only. Dulcolax suppository given for no BM in 3 days, no results yet.

## 2023-07-20 NOTE — CASE MANAGEMENT/SOCIAL WORK
Continued Stay Note  [facility]     Patient Name: Sherlyn Gutierrez  MRN: [id number]  Today's Date: 7/20/2023    Admit Date: 7/12/2023    Plan: [facility]   Discharge Plan       Row Name 07/20/23 0930       Plan    Plan [facility]    Patient/Family in Agreement with Plan yes    Final Discharge Disposition Code 03 - skilled nursing facility (SNF)    Final Note Pt is being discharged to [facility] today, Brittani from facility aware and pt to be admitted at SNF level care. Informed Gila Cerrato Daughter   [phone] and pt will travel there via [facility] ambulance [phone].    [facility]   [phone]  Fax: [phone]  Brittani's Cell [phone]                   Discharge Codes    No documentation.                 Expected Discharge Date and Time       Expected Discharge Date Expected Discharge Time    Jul 20, 2023               TORRI Chavez

## 2023-07-20 NOTE — DISCHARGE PLACEMENT REQUEST
"Sohail Zamarripa (78 y.o. Female)       Date of Birth   1945    Social Security Number       Address   [address] [place] [address]    Home Phone   [phone]    MRN   [id number]       Sikhism   Other    Marital Status                               Admission Date   7/12/23    Admission Type   Emergency    Admitting Provider   Osbaldo Lynch MD    Attending Provider   Osbaldo Lynch MD    Department, Room/Bed   [facility] 3A, 354/1       Discharge Date       Discharge Disposition   Skilled Nursing Facility (DC - External)    Discharge Destination                                 Attending Provider: Osbaldo Lynch MD    Allergies: No Known Allergies    Isolation: None   Infection: None   Code Status: No CPR    Ht: 167.6 cm (66\")   Wt: 56.7 kg (125 lb)    Admission Cmt: None   Principal Problem: Hypertensive urgency [I16.0]                   Active Insurance as of 7/12/2023       Primary Coverage       Payor Plan Insurance Group Employer/Plan Group    MEDICARE MEDICARE A & B        Payor Plan Address Payor Plan Phone Number Payor Plan Fax Number Effective Dates    [address] [phone]  3/1/2010 - None Entered    [place] [id number]         Subscriber Name Subscriber Birth Date Member ID       SOHAIL ZAMARRIPA 1945 [id number]                     Emergency Contacts        (Rel.) Home Phone Work Phone Mobile Phone    Gila Cerrato (Daughter) -- -- [phone]    lindanora hong (Spouse) -- -- [phone]                "

## 2023-07-21 NOTE — THERAPY DISCHARGE NOTE
Acute Care - Occupational Therapy Discharge Summary  [facility]     Patient Name: Sherlyn Gutierrez  : 1945  MRN: [id number]    Today's Date: 2023                 Admit Date: 2023        OT Recommendation and Plan    Visit Dx:    ICD-10-CM ICD-9-CM   1. Syncope and collapse  R55 780.2   2. Dysphagia, unspecified type  R13.10 787.20   3. Decreased independence with activities of daily living [Z78.9 (ICD-10-CM)]  Z78.9 V49.89   4. Impaired mobility [Z74.09 (ICD-10-CM)]  Z74.09 799.89   5. Cognitive and behavioral changes  R41.89 799.59    R46.89 312.9   6. Hyperglycemia  R73.9 790.29   7. Hypokalemia  E87.6 276.8   8. Hyperlipidemia, unspecified hyperlipidemia type  E78.5 272.4   9. History of coronary artery bypass graft  Z95.1 V45.81   10. Type 2 myocardial infarction due to hypertension  I10 410.90    I21.A1 402.90   11. Hypertensive urgency  I16.0 401.9                OT Rehab Goals       Row Name 23 0800             Transfer Goal 1 (OT)    Activity/Assistive Device (Transfer Goal 1, OT) commode, bedside with drop arms  -JJ      [place] Level/Cues Needed (Transfer Goal 1, OT) moderate assist (50-74% patient effort)  -JJ      Time Frame (Transfer Goal 1, OT) long term goal (LTG);by discharge  -JJ      Progress/Outcome (Transfer Goal 1, OT) goal not met;discharged from facility  -JJ         Dressing Goal 1 (OT)    Activity/Device (Dressing Goal 1, OT) dressing skills, all  -JJ      [place]/Cues Needed (Dressing Goal 1, OT) moderate assist (50-74% patient effort)  -JJ      Time Frame (Dressing Goal 1, OT) long term goal (LTG);by discharge  -JJ      Progress/Outcome (Dressing Goal 1, OT) goal not met;discharged from facility  -JJ         Toileting Goal 1 (OT)    Activity/Device (Toileting Goal 1, OT) toileting skills, all  -JJ      [place] Level/Cues Needed (Toileting Goal 1, OT) moderate assist (50-74% patient effort)  -JJ      Time Frame (Toileting Goal 1, OT) long term goal (LTG);by  discharge  -EVAN      Progress/Outcome (Toileting Goal 1, OT) goal not met;discharged from facility  -                User Key  (r) = Recorded By, (t) = Taken By, (c) = Cosigned By      Initials Name Provider Type Caren Huitron, OTR/L, CSRS Occupational Therapist OT                     Outcome Measures       Row Name 07/19/23 1500 07/19/23 1400 07/18/23 1500       How much help from another person do you currently need...    Turning from your back to your side while in flat bed without using bedrails? -- 2  -KJ 1  -KJ    Moving from lying on back to sitting on the side of a flat bed without bedrails? -- 2  -KJ 2  -KJ    Moving to and from a bed to a chair (including a wheelchair)? -- 1  -KJ 1  -KJ    Standing up from a chair using your arms (e.g., wheelchair, bedside chair)? -- 1  -KJ 1  -KJ    Climbing 3-5 steps with a railing? -- 1  -KJ 1  -KJ    To walk in hospital room? -- 1  -KJ 1  -KJ    AM-PAC 6 Clicks Score (PT) -- 8  -KJ 7  -KJ       How much help from another is currently needed...    Putting on and taking off regular lower body clothing? 2  -TS -- --    Bathing (including washing, rinsing, and drying) 2  -TS -- --    Toileting (which includes using toilet bed pan or urinal) 2  -TS -- --    Putting on and taking off regular upper body clothing 2  -TS -- --    Taking care of personal grooming (such as brushing teeth) 2  -TS -- --    Eating meals 3  -TS -- --    AM-PAC 6 Clicks Score (OT) 13  -TS -- --       Functional Assessment    Outcome Measure Options -- AM-PAC 6 Clicks Basic Mobility (PT)  -KJ AM-PAC 6 Clicks Basic Mobility (PT)  -KJ              User Key  (r) = Recorded By, (t) = Taken By, (c) = Cosigned By      Initials Name Provider Type    KJ Cora Arredondo, OSMAN Physical Therapist Assistant    Vivienne Ramírez COTA Occupational Therapist Assistant                    Timed Therapy Charges  Total Units: 2      Suggested Charges  Total Units: 2      Procedure Name  Documented Minutes Units Code    HC OT SELF CARE/MGMT/TRAIN EA 15 MIN 25 2   74549 (CPT®)                 Documented Minutes  Total Minutes: 25      Therapy Provided Minutes    38809 - OT Self Care/Mgmt Minutes 25                        OT Discharge Summary  Anticipated Discharge Disposition (OT): inpatient rehabilitation facility  Reason for Discharge: Discharge from facility  Outcomes Achieved: Refer to plan of care for updates on goals achieved  Discharge Destination: SNF      ROBERT Alvarez/L, CSRS  7/21/2023

## 2023-07-21 NOTE — THERAPY DISCHARGE NOTE
Acute Care - Speech Language Pathology Discharge Summary  [facility]       Patient Name: Sherlyn Gutierrez  : 1945  MRN: [id number]    Today's Date: 2023                   Admit Date: 2023      SLP Recommendation and Plan  Soft to chew with thin liquids.     Visit Dx:    ICD-10-CM ICD-9-CM   1. Syncope and collapse  R55 780.2   2. Dysphagia, unspecified type  R13.10 787.20   3. Decreased independence with activities of daily living [Z78.9 (ICD-10-CM)]  Z78.9 V49.89   4. Impaired mobility [Z74.09 (ICD-10-CM)]  Z74.09 799.89   5. Cognitive and behavioral changes  R41.89 799.59    R46.89 312.9   6. Hyperglycemia  R73.9 790.29   7. Hypokalemia  E87.6 276.8   8. Hyperlipidemia, unspecified hyperlipidemia type  E78.5 272.4   9. History of coronary artery bypass graft  Z95.1 V45.81   10. Type 2 myocardial infarction due to hypertension  I10 410.90    I21.A1 402.90   11. Hypertensive urgency  I16.0 401.9                SLP GOALS       Row Name 23 1400             (LTG) Swallow    (LTG) Swallow Pt will tolerate least restrictive diet with no overt s/s of aspiration.  -MG      [place] (Swallow Long Term Goal) independently (over 90% accuracy)  -MG      Time Frame (Swallow Long Term Goal) by discharge  -MG      Barriers (Swallow Long Term Goal) n/a  -MG      Progress/Outcomes (Swallow Long Term Goal) discharged from facility;goal partially met  -MG      Comment (Swallow Long Term Goal) n/a  -MG         (STG) Patient will tolerate trials of    Consistencies Trialed (Tolerate trials) regular textures;soft to chew (ground) textures;thin liquids  -MG      Desired Outcome (Tolerate trials) without signs/symptoms of aspiration;with adequate oral prep/transit/clearance  -MG      [place] (Tolerate trials) independently (over 90% accuracy)  -MG      Time Frame (Tolerate trials) by discharge  -MG      Progress/Outcomes (Tolerate trials) discharged from facility;goal partially met  -MG      Comment (Tolerate  "trials) n/a  -MG         Patient will demonstrate functional cognitive-linguistic skills for return to discharge environment    [place] with minimal cues  -MG      Time frame by discharge  -MG      Barriers cognitive status  -MG      Progress/Outcomes discharged from facility;goal not met  -MG         Orientation Goal 1 (SLP)    Improve Orientation Through Goal 1 (SLP) demonstrating orientation to month;demonstrating orientation to year;90%  -MG      Time Frame (Orientation Goal 1, SLP) by discharge  -MG      Barriers (Orientation Goal 1, SLP) Cognition  -MG      Progress/Outcomes (Orientation Goal 1, SLP) discharged from facility;goal not met  -MG         Organizational Skills Goal 1 (SLP)    Improve Thought Organization Through Goal 1 (SLP) completing a divergent naming task;abstract;90%  -MG      Time Frame (Thought Organization Skills Goal 1, SLP) by discharge  -MG      Barriers (Thought Organization Skills Goal 1, SLP) Cognition  -MG      Progress/Outcomes (Thought Organization Skills Goal 1, SLP) discharged from facility;goal not met  -MG         Reasoning Goal 1 (SLP)    Improve Reasoning Through Goal 1 (SLP) identify absurdities;90%  -MG      Time Frame (Reasoning Goal 1, SLP) by discharge  -MG      Barriers (Reasoning Goal 1, SLP) Cognition  -MG      Progress/Outcomes (Reasoning Goal 1, SLP) discharged from facility;goal not met  -MG         Functional Problem Solving Skills Goal 1 (SLP)    Improve Problem Solving Through Goal 1 (SLP) determine solutions to complex problems;determine solutions to multifactorial problems;answer \"what if\" questions;90%  -MG      Time Frame (Problem Solving Goal 1, SLP) by discharge  -MG      Barriers (Problem Solving Goal 1, SLP) Cognition  -MG      Progress/Outcomes (Problem Solving Goal 1, SLP) discharged from facility;goal not met  -MG         Functional Math Skills Goal 1 (SLP)    Improve Functional Math Skills Through Goal 1 (SLP) complete word problems involving " time;complete word problems involving money  -MG      Time Frame (Functional Math Skills Goal 1, SLP) by discharge  -MG      Barriers (Functional Math Skills Goal 1, SLP) Cognition  -MG      Progress/Outcomes (Functional Math Skills Goal 1, SLP) discharged from facility;goal not met  -MG                User Key  (r) = Recorded By, (t) = Taken By, (c) = Cosigned By      Initials Name Provider Type    Mary Higgins MS CCC-SLP Speech and Language Pathologist                            SLP Discharge Summary  Anticipated Discharge Disposition (SLP): inpatient rehabilitation facility  Reason for Discharge: discharge from this facility  Progress Toward Achieving Short/long Term Goals: goals partially met within established timelines  Discharge Destination: SNF      Mary Medeiros MS CCC-SLP  7/21/2023

## 2023-07-22 NOTE — THERAPY DISCHARGE NOTE
Acute Care - Physical Therapy Discharge Summary  [facility]       Patient Name: Sherlyn Gutierrez  : 1945  MRN: [id number]    Today's Date: 2023                 Admit Date: 2023      PT Recommendation and Plan    Visit Dx:    ICD-10-CM ICD-9-CM   1. Syncope and collapse  R55 780.2   2. Dysphagia, unspecified type  R13.10 787.20   3. Decreased independence with activities of daily living [Z78.9 (ICD-10-CM)]  Z78.9 V49.89   4. Impaired mobility [Z74.09 (ICD-10-CM)]  Z74.09 799.89   5. Cognitive and behavioral changes  R41.89 799.59    R46.89 312.9   6. Hyperglycemia  R73.9 790.29   7. Hypokalemia  E87.6 276.8   8. Hyperlipidemia, unspecified hyperlipidemia type  E78.5 272.4   9. History of coronary artery bypass graft  Z95.1 V45.81   10. Type 2 myocardial infarction due to hypertension  I10 410.90    I21.A1 402.90   11. Hypertensive urgency  I16.0 401.9                PT Rehab Goals       Row Name 23 1500             Bed Mobility Goal 1 (PT)    Activity/Assistive Device (Bed Mobility Goal 1, PT) sit to supine/supine to sit  -AB      [place] Level/Cues Needed (Bed Mobility Goal 1, PT) minimum assist (75% or more patient effort)  -AB      Time Frame (Bed Mobility Goal 1, PT) long term goal (LTG);10 days  -AB      Progress/Outcomes (Bed Mobility Goal 1, PT) goal not met  -AB         Transfer Goal 1 (PT)    Activity/Assistive Device (Transfer Goal 1, PT) sit-to-stand/stand-to-sit;bed-to-chair/chair-to-bed  -AB      [place] Level/Cues Needed (Transfer Goal 1, PT) minimum assist (75% or more patient effort)  -AB      Time Frame (Transfer Goal 1, PT) long term goal (LTG);10 days  -AB      Progress/Outcome (Transfer Goal 1, PT) goal not met  -AB                User Key  (r) = Recorded By, (t) = Taken By, (c) = Cosigned By      Initials Name Provider Type Discipline    AB Lesley Aburto, PTA Physical Therapist Assistant PT                        PT Discharge Summary  Anticipated Discharge  Disposition (PT): inpatient rehabilitation facility  Reason for Discharge: Discharge from facility  Outcomes Achieved: Refer to plan of care for updates on goals achieved  Discharge Destination: SNF      Lesley Aburto, PTA   7/22/2023